# Patient Record
Sex: FEMALE | Race: WHITE | NOT HISPANIC OR LATINO | Employment: OTHER | URBAN - METROPOLITAN AREA
[De-identification: names, ages, dates, MRNs, and addresses within clinical notes are randomized per-mention and may not be internally consistent; named-entity substitution may affect disease eponyms.]

---

## 2017-04-01 ENCOUNTER — HOSPITAL ENCOUNTER (OUTPATIENT)
Dept: LAB | Facility: MEDICAL CENTER | Age: 72
End: 2017-04-01
Attending: FAMILY MEDICINE
Payer: MEDICARE

## 2017-04-01 LAB
ALBUMIN SERPL BCP-MCNC: 4.7 G/DL (ref 3.2–4.9)
ALBUMIN/GLOB SERPL: 1.5 G/DL
ALP SERPL-CCNC: 68 U/L (ref 30–99)
ALT SERPL-CCNC: 16 U/L (ref 2–50)
ANION GAP SERPL CALC-SCNC: 6 MMOL/L (ref 0–11.9)
APPEARANCE UR: ABNORMAL
AST SERPL-CCNC: 21 U/L (ref 12–45)
BACTERIA #/AREA URNS HPF: ABNORMAL /HPF
BASOPHILS # BLD AUTO: 0.04 K/UL (ref 0–0.12)
BASOPHILS NFR BLD AUTO: 0.7 % (ref 0–1.8)
BILIRUB SERPL-MCNC: 0.7 MG/DL (ref 0.1–1.5)
BILIRUB UR QL STRIP.AUTO: NEGATIVE
BUN SERPL-MCNC: 25 MG/DL (ref 8–22)
CALCIUM SERPL-MCNC: 9.8 MG/DL (ref 8.5–10.5)
CHLORIDE SERPL-SCNC: 102 MMOL/L (ref 96–112)
CHOLEST SERPL-MCNC: 203 MG/DL (ref 100–199)
CO2 SERPL-SCNC: 30 MMOL/L (ref 20–33)
COLOR UR AUTO: YELLOW
CREAT SERPL-MCNC: 0.96 MG/DL (ref 0.5–1.4)
EOSINOPHIL # BLD: 0.08 K/UL (ref 0–0.51)
EOSINOPHIL NFR BLD AUTO: 1.4 % (ref 0–6.9)
EPITHELIAL CELLS 1715: ABNORMAL /HPF
ERYTHROCYTE [DISTWIDTH] IN BLOOD BY AUTOMATED COUNT: 42.6 FL (ref 35.9–50)
GLOBULIN SER CALC-MCNC: 3.2 G/DL (ref 1.9–3.5)
GLUCOSE SERPL-MCNC: 92 MG/DL (ref 65–99)
GLUCOSE UR STRIP.AUTO-MCNC: NEGATIVE MG/DL
HCT VFR BLD AUTO: 47.2 % (ref 37–47)
HDLC SERPL-MCNC: 52 MG/DL
HGB BLD-MCNC: 15.3 G/DL (ref 12–16)
IMM GRANULOCYTES # BLD AUTO: 0.01 K/UL (ref 0–0.11)
IMM GRANULOCYTES NFR BLD AUTO: 0.2 % (ref 0–0.9)
KETONES UR STRIP.AUTO-MCNC: NEGATIVE MG/DL
LDLC SERPL CALC-MCNC: 127 MG/DL
LEUKOCYTE ESTERASE UR QL STRIP.AUTO: ABNORMAL
LYMPHOCYTES # BLD: 2.49 K/UL (ref 1–4.8)
LYMPHOCYTES NFR BLD AUTO: 43.9 % (ref 22–41)
MCH RBC QN AUTO: 29.3 PG (ref 27–33)
MCHC RBC AUTO-ENTMCNC: 32.4 G/DL (ref 33.6–35)
MCV RBC AUTO: 90.4 FL (ref 81.4–97.8)
MICRO URNS: ABNORMAL
MONOCYTES # BLD: 0.48 K/UL (ref 0–0.85)
MONOCYTES NFR BLD AUTO: 8.5 % (ref 0–13.4)
MUCOUS THREADS URNS QL MICRO: ABNORMAL /HPF
NEUTROPHILS # BLD: 2.57 K/UL (ref 2–7.15)
NEUTROPHILS NFR BLD AUTO: 45.3 % (ref 44–72)
NITRITE UR QL STRIP.AUTO: NEGATIVE
NRBC # BLD AUTO: 0 K/UL
NRBC BLD-RTO: 0 /100 WBC
PH UR: 5.5 [PH]
PLATELET # BLD AUTO: 159 K/UL (ref 164–446)
PMV BLD AUTO: 12.6 FL (ref 9–12.9)
POTASSIUM SERPL-SCNC: 4 MMOL/L (ref 3.6–5.5)
PROT SERPL-MCNC: 7.9 G/DL (ref 6–8.2)
PROT UR QL STRIP: NEGATIVE MG/DL
RBC # BLD AUTO: 5.22 M/UL (ref 4.2–5.4)
RBC #/AREA URNS HPF: ABNORMAL /HPF
RBC UR QL AUTO: NEGATIVE
SODIUM SERPL-SCNC: 138 MMOL/L (ref 135–145)
SP GR UR STRIP.AUTO: 1.02
TRANS CELLS URNS QL MICRO: ABNORMAL /HPF
TRIGL SERPL-MCNC: 118 MG/DL (ref 0–149)
TSH SERPL DL<=0.005 MIU/L-ACNC: 1.41 UIU/ML (ref 0.3–3.7)
WBC # BLD AUTO: 5.7 K/UL (ref 4.8–10.8)
WBC #/AREA URNS HPF: ABNORMAL /HPF

## 2017-04-01 PROCEDURE — 81001 URINALYSIS AUTO W/SCOPE: CPT

## 2017-04-01 PROCEDURE — 84443 ASSAY THYROID STIM HORMONE: CPT

## 2017-04-01 PROCEDURE — 80053 COMPREHEN METABOLIC PANEL: CPT

## 2017-04-01 PROCEDURE — 80061 LIPID PANEL: CPT

## 2017-04-01 PROCEDURE — 36415 COLL VENOUS BLD VENIPUNCTURE: CPT

## 2017-04-01 PROCEDURE — 85025 COMPLETE CBC W/AUTO DIFF WBC: CPT

## 2017-07-16 ENCOUNTER — APPOINTMENT (OUTPATIENT)
Dept: RADIOLOGY | Facility: MEDICAL CENTER | Age: 72
End: 2017-07-16
Attending: EMERGENCY MEDICINE
Payer: MEDICARE

## 2017-07-16 ENCOUNTER — HOSPITAL ENCOUNTER (EMERGENCY)
Facility: MEDICAL CENTER | Age: 72
End: 2017-07-16
Attending: EMERGENCY MEDICINE
Payer: MEDICARE

## 2017-07-16 VITALS
WEIGHT: 102 LBS | HEART RATE: 83 BPM | RESPIRATION RATE: 18 BRPM | DIASTOLIC BLOOD PRESSURE: 64 MMHG | HEIGHT: 62 IN | TEMPERATURE: 99.6 F | SYSTOLIC BLOOD PRESSURE: 152 MMHG | OXYGEN SATURATION: 96 % | BODY MASS INDEX: 18.77 KG/M2

## 2017-07-16 DIAGNOSIS — R07.9 CHEST PAIN, UNSPECIFIED TYPE: ICD-10-CM

## 2017-07-16 DIAGNOSIS — J18.9 PNEUMONIA OF RIGHT MIDDLE LOBE DUE TO INFECTIOUS ORGANISM: ICD-10-CM

## 2017-07-16 LAB
ALBUMIN SERPL BCP-MCNC: 3.9 G/DL (ref 3.2–4.9)
ALBUMIN/GLOB SERPL: 1.1 G/DL
ALP SERPL-CCNC: 69 U/L (ref 30–99)
ALT SERPL-CCNC: 16 U/L (ref 2–50)
ANION GAP SERPL CALC-SCNC: 9 MMOL/L (ref 0–11.9)
APPEARANCE UR: CLEAR
APTT PPP: 24.9 SEC (ref 24.7–36)
AST SERPL-CCNC: 19 U/L (ref 12–45)
BACTERIA #/AREA URNS HPF: ABNORMAL /HPF
BASOPHILS # BLD AUTO: 0.1 % (ref 0–1.8)
BASOPHILS # BLD: 0.01 K/UL (ref 0–0.12)
BILIRUB SERPL-MCNC: 0.7 MG/DL (ref 0.1–1.5)
BILIRUB UR QL STRIP.AUTO: NEGATIVE
BNP SERPL-MCNC: 28 PG/ML (ref 0–100)
BUN SERPL-MCNC: 13 MG/DL (ref 8–22)
CALCIUM SERPL-MCNC: 9.2 MG/DL (ref 8.4–10.2)
CHLORIDE SERPL-SCNC: 102 MMOL/L (ref 96–112)
CO2 SERPL-SCNC: 27 MMOL/L (ref 20–33)
COLOR UR: YELLOW
CREAT SERPL-MCNC: 0.74 MG/DL (ref 0.5–1.4)
EKG IMPRESSION: NORMAL
EOSINOPHIL # BLD AUTO: 0.02 K/UL (ref 0–0.51)
EOSINOPHIL NFR BLD: 0.2 % (ref 0–6.9)
EPI CELLS #/AREA URNS HPF: ABNORMAL /HPF
ERYTHROCYTE [DISTWIDTH] IN BLOOD BY AUTOMATED COUNT: 41.7 FL (ref 35.9–50)
GFR SERPL CREATININE-BSD FRML MDRD: >60 ML/MIN/1.73 M 2
GLOBULIN SER CALC-MCNC: 3.5 G/DL (ref 1.9–3.5)
GLUCOSE SERPL-MCNC: 107 MG/DL (ref 65–99)
GLUCOSE UR STRIP.AUTO-MCNC: NEGATIVE MG/DL
HCT VFR BLD AUTO: 41.1 % (ref 37–47)
HGB BLD-MCNC: 14 G/DL (ref 12–16)
IMM GRANULOCYTES # BLD AUTO: 0.02 K/UL (ref 0–0.11)
IMM GRANULOCYTES NFR BLD AUTO: 0.2 % (ref 0–0.9)
INR PPP: 0.98 (ref 0.87–1.13)
KETONES UR STRIP.AUTO-MCNC: NEGATIVE MG/DL
LACTATE BLD-SCNC: 1.37 MMOL/L (ref 0.5–2)
LEUKOCYTE ESTERASE UR QL STRIP.AUTO: NEGATIVE
LIPASE SERPL-CCNC: 19 U/L (ref 7–58)
LYMPHOCYTES # BLD AUTO: 1.21 K/UL (ref 1–4.8)
LYMPHOCYTES NFR BLD: 10.8 % (ref 22–41)
MCH RBC QN AUTO: 30.2 PG (ref 27–33)
MCHC RBC AUTO-ENTMCNC: 34.1 G/DL (ref 33.6–35)
MCV RBC AUTO: 88.8 FL (ref 81.4–97.8)
MICRO URNS: ABNORMAL
MONOCYTES # BLD AUTO: 0.82 K/UL (ref 0–0.85)
MONOCYTES NFR BLD AUTO: 7.3 % (ref 0–13.4)
NEUTROPHILS # BLD AUTO: 9.13 K/UL (ref 2–7.15)
NEUTROPHILS NFR BLD: 81.4 % (ref 44–72)
NITRITE UR QL STRIP.AUTO: NEGATIVE
NRBC # BLD AUTO: 0 K/UL
NRBC BLD AUTO-RTO: 0 /100 WBC
PH UR STRIP.AUTO: 7 [PH]
PLATELET # BLD AUTO: 178 K/UL (ref 164–446)
PMV BLD AUTO: 11.4 FL (ref 9–12.9)
POTASSIUM SERPL-SCNC: 4 MMOL/L (ref 3.6–5.5)
PROT SERPL-MCNC: 7.4 G/DL (ref 6–8.2)
PROT UR QL STRIP: NEGATIVE MG/DL
PROTHROMBIN TIME: 12.8 SEC (ref 12–14.6)
RBC # BLD AUTO: 4.63 M/UL (ref 4.2–5.4)
RBC # URNS HPF: ABNORMAL /HPF
RBC UR QL AUTO: ABNORMAL
SODIUM SERPL-SCNC: 138 MMOL/L (ref 135–145)
SP GR UR STRIP.AUTO: <=1.005
SPECIMEN DRAWN FROM PATIENT: NORMAL
T4 FREE SERPL-MCNC: 1.07 NG/DL (ref 0.53–1.43)
TROPONIN I SERPL-MCNC: <0.05 NG/ML
TSH SERPL DL<=0.005 MIU/L-ACNC: 1.27 UIU/ML (ref 0.3–3.7)
WBC # BLD AUTO: 11.2 K/UL (ref 4.8–10.8)
WBC #/AREA URNS HPF: ABNORMAL /HPF

## 2017-07-16 PROCEDURE — 96365 THER/PROPH/DIAG IV INF INIT: CPT

## 2017-07-16 PROCEDURE — 81001 URINALYSIS AUTO W/SCOPE: CPT

## 2017-07-16 PROCEDURE — 99284 EMERGENCY DEPT VISIT MOD MDM: CPT

## 2017-07-16 PROCEDURE — 93005 ELECTROCARDIOGRAM TRACING: CPT | Performed by: EMERGENCY MEDICINE

## 2017-07-16 PROCEDURE — 85610 PROTHROMBIN TIME: CPT

## 2017-07-16 PROCEDURE — 80053 COMPREHEN METABOLIC PANEL: CPT

## 2017-07-16 PROCEDURE — 700111 HCHG RX REV CODE 636 W/ 250 OVERRIDE (IP): Performed by: EMERGENCY MEDICINE

## 2017-07-16 PROCEDURE — 84443 ASSAY THYROID STIM HORMONE: CPT

## 2017-07-16 PROCEDURE — 83880 ASSAY OF NATRIURETIC PEPTIDE: CPT

## 2017-07-16 PROCEDURE — 85730 THROMBOPLASTIN TIME PARTIAL: CPT

## 2017-07-16 PROCEDURE — 84484 ASSAY OF TROPONIN QUANT: CPT

## 2017-07-16 PROCEDURE — 71010 DX-CHEST-PORTABLE (1 VIEW): CPT

## 2017-07-16 PROCEDURE — 85025 COMPLETE CBC W/AUTO DIFF WBC: CPT

## 2017-07-16 PROCEDURE — 700117 HCHG RX CONTRAST REV CODE 255: Performed by: EMERGENCY MEDICINE

## 2017-07-16 PROCEDURE — 74175 CTA ABDOMEN W/CONTRAST: CPT

## 2017-07-16 PROCEDURE — 93005 ELECTROCARDIOGRAM TRACING: CPT

## 2017-07-16 PROCEDURE — 700105 HCHG RX REV CODE 258: Performed by: EMERGENCY MEDICINE

## 2017-07-16 PROCEDURE — 36415 COLL VENOUS BLD VENIPUNCTURE: CPT

## 2017-07-16 PROCEDURE — 83605 ASSAY OF LACTIC ACID: CPT

## 2017-07-16 PROCEDURE — 83690 ASSAY OF LIPASE: CPT

## 2017-07-16 PROCEDURE — 96367 TX/PROPH/DG ADDL SEQ IV INF: CPT

## 2017-07-16 PROCEDURE — 84439 ASSAY OF FREE THYROXINE: CPT

## 2017-07-16 RX ORDER — SODIUM CHLORIDE 9 MG/ML
INJECTION, SOLUTION INTRAVENOUS CONTINUOUS
Status: DISCONTINUED | OUTPATIENT
Start: 2017-07-16 | End: 2017-07-17 | Stop reason: HOSPADM

## 2017-07-16 RX ORDER — AMPICILLIN AND SULBACTAM 2; 1 G/1; G/1
3 INJECTION, POWDER, FOR SOLUTION INTRAMUSCULAR; INTRAVENOUS ONCE
Status: COMPLETED | OUTPATIENT
Start: 2017-07-16 | End: 2017-07-16

## 2017-07-16 RX ORDER — AZITHROMYCIN 250 MG/1
TABLET, FILM COATED ORAL
Qty: 6 TAB | Refills: 0 | Status: SHIPPED | OUTPATIENT
Start: 2017-07-16 | End: 2017-11-07

## 2017-07-16 RX ADMIN — AMPICILLIN SODIUM AND SULBACTAM SODIUM 3 G: 2; 1 INJECTION, POWDER, FOR SOLUTION INTRAMUSCULAR; INTRAVENOUS at 20:07

## 2017-07-16 RX ADMIN — AZITHROMYCIN MONOHYDRATE 500 MG: 500 INJECTION, POWDER, LYOPHILIZED, FOR SOLUTION INTRAVENOUS at 20:50

## 2017-07-16 RX ADMIN — IOHEXOL 100 ML: 350 INJECTION, SOLUTION INTRAVENOUS at 18:52

## 2017-07-16 RX ADMIN — SODIUM CHLORIDE: 9 INJECTION, SOLUTION INTRAVENOUS at 17:57

## 2017-07-16 ASSESSMENT — PAIN SCALES - GENERAL: PAINLEVEL_OUTOF10: 0

## 2017-07-16 NOTE — ED AVS SNAPSHOT
Home Care Instructions                                                                                                                Kimmy Diaz   MRN: [id number]    Department:  [facility], Emergency Dept   Date of Visit:  7/16/2017            [facility], Emergency Dept    [address]    [place] [address]    Phone:  [phone]      You were seen by     Guy G Gansert, M.D.      Your Diagnosis Was     Pneumonia of right middle lobe due to infectious organism     J18.1       These are the medications you received during your hospitalization from 07/16/2017 1713 to 07/16/2017 2145     Date/Time Order Dose Route Action    07/16/2017 1757 NS infusion   Intravenous New Bag    07/16/2017 1852 iohexol (OMNIPAQUE) 350 mg/mL 100 mL Intravenous Given    07/16/2017 2007 ampicillin/sulbactam (UNASYN) injection 3 g 3 g Intravenous Given    07/16/2017 2050 azithromycin (ZITHROMAX) 500 mg in D5W 250 mL IVPB 500 mg Intravenous New Bag      Follow-up Information     1. Follow up with Grant Albert M.D..    Specialty:  Internal Medicine    Contact information    [address] [facility] [address]  [place] [address]  [phone]        Medication Information     Review all of your home medications and newly ordered medications with your primary doctor and/or pharmacist as soon as possible. Follow medication instructions as directed by your doctor and/or pharmacist.     Please keep your complete medication list with you and share with your physician. Update the information when medications are discontinued, doses are changed, or new medications (including over-the-counter products) are added; and carry medication information at all times in the event of emergency situations.               Medication List      START taking these medications        Instructions    Morning Afternoon Evening Bedtime    azithromycin 250 MG Tabs   Commonly known as:  ZITHROMAX        Take two tabs  by mouth on day one, then one tab by mouth daily on days 2-5.                          ASK your doctor about these medications        Instructions    Morning Afternoon Evening Bedtime    CALCIUM 500 + D PO        Take  by mouth every day.                        FLONASE NA        Spray  in nose every day.                        MIRALAX PO        Take  by mouth as needed.                        ONE-A-DAY WOMENS FORMULA PO        Take  by mouth every day.                        oxycodone-acetaminophen 5-325 MG Tabs   Commonly known as:  PERCOCET        Take 1-2 Tabs by mouth every four hours as needed for Mild Pain.   Dose:  1-2 Tab                        vitamin D 1000 UNIT Tabs   Commonly known as:  cholecalciferol        Take 1,000 Units by mouth every day.   Dose:  1000 Units                             Where to Get Your Medications      You can get these medications from any pharmacy     Bring a paper prescription for each of these medications    - azithromycin 250 MG Tabs            Procedures and tests performed during your visit     Procedure/Test Number of Times Performed    APTT 1    Btype Natriuretic Peptide 1    CBC w/ Differential 1    CONSENT FOR CONTRAST INJECTION 1    CT-CTA COMPLETE THORACOABDOMINAL AORTA 1    Cardiac Monitoring 1    Complete Metabolic Panel (CMP) 1    DX-CHEST-PORTABLE (1 VIEW) 1    EKG (NOW) 2    ESTIMATED GFR 1    Free Thyroxine (add to TSH for patients with existing thyroid dysfunction) 1    IV Saline Lock 1    LACTIC ACID 1    Lipase 1    NURSING COMMUNICATION 1    Obtain Old EKG 1    PT/INR 1    Pulse Ox 1    Repeat Vitals Signs 1    TROPONIN MANUAL (ARBILL) 1    TSH (for screening thyroid dysfunction) 1    URINALYSIS 1    URINE MICROSCOPIC (W/UA) 1        Discharge Instructions         Chest Pain, Nonspecific  It is often hard to give a specific diagnosis for the cause of chest pain. There is always a chance that your pain could be related to something serious, like a heart  attack or a blood clot in the lungs. You need to follow up with your caregiver for further evaluation. More lab tests or other studies such as X-rays, electrocardiography, stress testing, or cardiac imaging may be needed to find the cause of your pain.  Most of the time, nonspecific chest pain improves within 2 to 3 days with rest and mild pain medicine. For the next few days, avoid physical exertion or activities that bring on pain. Do not smoke. Avoid drinking alcohol. Call your caregiver for routine follow-up as advised.   SEEK IMMEDIATE MEDICAL CARE IF:  · You develop increased chest pain or pain that radiates to the arm, neck, jaw, back, or abdomen.   · You develop shortness of breath, increased coughing, or you start coughing up blood.   · You have severe back or abdominal pain, nausea, or vomiting.   · You develop severe weakness, fainting, fever, or chills.   Document Released: 12/18/2006 Document Revised: 03/11/2013 Document Reviewed: 06/06/2008  [employer]® Patient Information ©2013 [employer].Pneumonia, Adult  Pneumonia is an infection of the lungs.   CAUSES  Pneumonia may be caused by bacteria or a virus. Usually, the infection is caused by breathing in droplets from an infected person's cough or sneeze.   SYMPTOMS   Symptoms of pneumonia include:  · Cough.  · Fever.  · Chest pain.  · Rapid breathing.  · Shortness of breath.  · Shaking chills.  · Mucus production.  DIAGNOSIS   If you have the common symptoms of pneumonia, often your health care provider will confirm the diagnosis with a chest X-ray. The X-ray will show an abnormality in the lung if you have pneumonia. Other tests may be done on your blood, urine, or mucus (sputum) to find the specific cause of your pneumonia. A blood gas test or pulse oximetry test may be needed to check how well your lungs are working.  TREATMENT   Your treatment will depend on whether your pneumonia is caused by bacteria or a virus.   · Bacterial pneumonia is treated  with antibiotic medicine.  · Pneumonia that is caused by the influenza virus may be treated with an antiviral medicine.  · Pneumonia that is caused by a virus other than influenza will not respond to antibiotic medicine. This type of pneumonia will have to run its course.   HOME CARE INSTRUCTIONS   · Cough suppressants may be used if you are losing too much rest from coughing at night. However, you should try to avoid taking cough suppresants. This is because coughing helps to remove mucus from your lungs.  · Sleep in a semi-upright position at night. Try sleeping in a reclining chair, or place a few pillows under your head.  · Try using a cold steam vaporizer or humidifier in your home or bedroom. This may help loosen your mucus.  · If you were prescribed an antibiotic medicine, finish all of it even if you start to feel better.  · If you were prescribed an expectorant, take it as directed by your health care provider. This medicine loosens the mucus so you can cough it up.  · Take medicines only as directed by your health care provider.  · Do not smoke. If you are a smoker and continue to smoke, your cough may last several weeks after your pneumonia has cleared.  · Get rest when you feel tired, or as needed.  PREVENTION  A pneumococcal shot (vaccine) is available to prevent a common bacterial cause of pneumonia. This is usually suggested for:  · People over 65 years old.  · People on chemotherapy.  · People with chronic lung problems, such as bronchitis or emphysema.  · People with immune system problems.  If you are over 65 years old or have a high risk condition, you may receive the pneumococcal vaccine if you have not received it before. In some countries, a routine influenza vaccine is also recommended. This vaccine can help prevent some cases of pneumonia. You may be offered the influenza vaccine as part of your care.  If you are a smoker, it is time to quit in order to prevent pneumonia in the future. You  may receive instructions on how to stop smoking. Your health care provider can provide medicines and counseling to help you quit.  SEEK MEDICAL CARE IF:  · You have a fever.  · You cannot control your cough with suppressants at night, and you keep losing sleep.  SEEK IMMEDIATE MEDICAL CARE IF:   · You have worsening shortness of breath.  · You have increased chest pain.  · Your sickness becomes worse, especially if you are an older adult or have a weakened immune system.  · You cough up blood.  · You have pain that is getting worse or is not controlled with medicines.  · Your symptoms are getting worse rather than better.     This information is not intended to replace advice given to you by your health care provider. Make sure you discuss any questions you have with your health care provider.     Document Released: 12/18/2006 Document Revised: 01/08/2016 Document Reviewed: 04/13/2016  [employer] Patient Education ©2016 [employer]            Patient Information     Patient Information    Following emergency treatment: all patient requiring follow-up care must return either to a private physician or a clinic if your condition worsens before you are able to obtain further medical attention, please return to the emergency room.     Billing Information    At [facility], we work to make the billing process streamlined for our patients.  Our Representatives are here to answer any questions you may have regarding your hospital bill.  If you have insurance coverage and have supplied your insurance information to us, we will submit a claim to your insurer on your behalf.  Should you have any questions regarding your bill, we can be reached online or by phone as follows:  Online: You are able pay your bills online or live chat with our representatives about any billing questions you may have. We are here to help Monday - Friday from 8:00am to 7:30pm and 9:00am - 12:00pm on Saturdays.  Please visit  [email or web address]  for more information.   Phone:  [phone] or [phone]    Please note that your emergency physician, surgeon, pathologist, radiologist, anesthesiologist, and other specialists are not employed by [facility] and will therefore bill separately for their services.  Please contact them directly for any questions concerning their bills at the numbers below:     Emergency Physician Services:  [phone]  [facility]:  [phone]  [facility]:  [phone]  [facility]:  [phone]    1. Your final bill may vary from the amount quoted upon discharge if all procedures are not complete at that time, or if your doctor has additional procedures of which we are not aware. You will receive an additional bill if you return to the Emergency Department at [facility] for suture removal regardless of the facility of which the sutures were placed.     2. Please arrange for settlement of this account at the emergency registration.    3. All self-pay accounts are due in full at the time of treatment.  If you are unable to meet this obligation then payment is expected within 4-5 days.     4. If you have had radiology studies (CT, X-ray, Ultrasound, MRI), you have received a preliminary result during your emergency department visit. Please contact the radiology department [phone] to receive a copy of your final result. Please discuss the Final result with your primary physician or with the follow up physician provided.     Crisis Hotline:  [place] Crisis Hotline:  5-491-NGDHTBO or 1-829.845.3185  [place] Crisis Hotline:    1-771.945.7181 or 670-970-3465         ED Discharge Follow Up Questions    1. In order to provide you with very good care, we would like to follow up with a phone call in the next few days.  May we have your permission to contact you?     YES /  NO    2. What is the best phone number to call  you? (       )_____-__________    3. What is the best time to call you?      Morning  /  Afternoon  /  Evening                   Patient Signature:  ____________________________________________________________    Date:  ____________________________________________________________      Your appointments     Sep 15, 2017  9:30 AM   NEW PATIENT with Jaiden Baldwin M.D.   [facility] (--)    [address] [facility], [address] [address]   [phone]

## 2017-07-16 NOTE — ED AVS SNAPSHOT
[employer] Access Code: [email or web address]  Expires: 8/15/2017  9:45 PM    Your email address is not on file at [facility].  Email Addresses are required for you to sign up for [employer], please contact [phone] to verify your personal information and to provide your email address prior to attempting to register for [employer].    Kimmy Diaz  [address] [place] [address]    [employer]  A secure, online tool to manage your health information     [employer]’s [employer]® is a secure, online tool that connects you to your personalized health information from the privacy of your home -- day or night - making it very easy for you to manage your healthcare. Once the activation process is completed, you can even access your medical information using the [employer] kenneth, which is available for free in the Apple Kenneth store or Google Play store.     To learn more about [employer], visit [email or web address]    There are two levels of access available (as shown below):   My Chart Features  [facility] Primary Care Doctor [facility]  Specialists [facility]  Urgent  Care Non-[facility] Primary Care Doctor   Email your healthcare team securely and privately 24/7 X X X    Manage appointments: schedule your next appointment; view details of past/upcoming appointments X      Request prescription refills. X      View recent personal medical records, including lab and immunizations X X X X   View health record, including health history, allergies, medications X X X X   Read reports about your outpatient visits, procedures, consult and ER notes X X X X   See your discharge summary, which is a recap of your hospital and/or ER visit that includes your diagnosis, lab results, and care plan X X  X     How to register for [employer]:  Once your e-mail address has been verified, follow the following steps to sign up for [employer].     1. Go to  [email or web address]  2. Click on the Sign Up Now box, which takes you to the New Member Sign Up page. You will  need to provide the following information:  a. Enter your [employer] Access Code exactly as it appears at the top of this page. (You will not need to use this code after you’ve completed the sign-up process. If you do not sign up before the expiration date, you must request a new code.)   b. Enter your date of birth.   c. Enter your home email address.   d. Click Submit, and follow the next screen’s instructions.  3. Create a Exclusive [employer] ID. This will be your [employer] login ID and cannot be changed, so think of one that is secure and easy to remember.  4. Create a [employer] password. You can change your password at any time.  5. Enter your Password Reset Question and Answer. This can be used at a later time if you forget your password.   6. Enter your e-mail address. This allows you to receive e-mail notifications when new information is available in [employer].  7. Click Sign Up. You can now view your health information.    For assistance activating your [employer] account, call [phone]

## 2017-07-16 NOTE — ED AVS SNAPSHOT
7/16/2017    Kimmy Diaz  [address]  [place] [address]    Dear Kimmy:    [facility] wants to ensure your discharge home is safe and you or your loved ones have had all of your questions answered regarding your care after you leave the hospital.    Below is a list of resources and contact information should you have any questions regarding your hospital stay, follow-up instructions, or active medical symptoms.    Questions or Concerns Regarding… Contact   Medical Questions Related to Your Discharge  (7 days a week, 8am-5pm) Contact a Nurse Care Coordinator   [phone]   Medical Questions Not Related to Your Discharge  (24 hours a day / 7 days a week)  Contact the Nurse Health Line   [phone]    Medications or Discharge Instructions Refer to your discharge packet   or contact your [facility] Primary Care Provider   [phone]   Follow-up Appointment(s) Schedule your appointment via [employer]   or contact Scheduling [phone]   Billing Review your statement via [employer]  or contact Billing [phone]   Medical Records Review your records via [employer]   or contact Medical Records [phone]     You may receive a telephone call within two days of discharge. This call is to make certain you understand your discharge instructions and have the opportunity to have any questions answered. You can also easily access your medical information, test results and upcoming appointments via the [employer] free online health management tool. You can learn more and sign up at [email or web address]. For assistance setting up your [employer] account, please call [phone].    Once again, we want to ensure your discharge home is safe and that you have a clear understanding of any next steps in your care. If you have any questions or concerns, please do not hesitate to contact us, we are here for you. Thank you for choosing [facility] for your healthcare needs.    Sincerely,    Your [facility] Healthcare Team

## 2017-07-17 NOTE — ED PROVIDER NOTES
ED Provider Note    CHIEF COMPLAINT  Chief Complaint   Patient presents with   • Epigastric Pain     heart burn, rash , sore throat       HPI  Kimmy Diaz is a 72 y.o. female who presents for evaluation of chest pain.  The patient states she's been having problems with chest pain and tightness in chest for several years.  She has attributed to tightness in her bra off and she would normally take it off and relieve the symptoms.  However, lately she is having the symptoms despite eliminating or not wearing a bra.  She was seen by her primary care physician recently and an EKG was done which apparently was normal.  The patient had increased symptoms today and was sent in by her primary care because of the concern for possible aortic aneurysm.  The patient has some mild shortness of breath associated with it.  She denies associated exertional chest pain.  She has some slight nausea but denies any vomiting or specific food intolerance.  Currently, she denies: Fever, chills, cough, sputum, vomiting, hematemesis, melena hematochezia, hematuria, dysuria, palpitations, syncope, pain or swelling lower extremities, neurological symptoms.  The patient did develop a rash on her back while taking a shower today.  She states she took some over-the-counter antihistamine as well as Gaviscon today.  No other acute symptomatology or complaints.    REVIEW OF SYSTEMS  See HPI for further details.  No history of: Hypertension, diabetes, seizures, heart disease, gastrointestinal disorders, cancer, stroke.  She does take Synthroid.  She's had surgery for a cystocele and a rectocele.  All other systems negative.    PAST MEDICAL HISTORY  Past Medical History   Diagnosis Date   • Osteoporosis    • Vitamin D deficiency    • Allergic rhinitis    • Hemochromatosis      carrier   • Other specified symptom associated with female genital organs      prolapse   • Urinary bladder disorder      prolapse   • Cataract        FAMILY  "HISTORY  Family History   Problem Relation Age of Onset   • Diabetes         SOCIAL HISTORY  Nonsmoker; positive alcohol use;    SURGICAL HISTORY  Past Surgical History   Procedure Laterality Date   • Tubal ligation  1980   • Other       wisdom  teeth   • Vaginal hysterectomy scope total  8/24/2011     Performed by QAMAR COPELAND at SURGERY SAME DAY [facility]S   • Anterior and posterior repair  8/24/2011     Performed by QAMAR COPELAND at SURGERY SAME DAY [facility]S   • Shoulder arthroscopy w/ rotator cuff repair  7/11/2012     Performed by SVETLANA MCLEOD at SURGERY [facility] ORS   • Biceps tendon repair  7/11/2012     Performed by SVETLANA MCLEOD at SURGERY [facility] ORS   • Shoulder decompression  7/11/2012     Performed by SVETLANA MCLEOD at SURGERY [facility] ORS       CURRENT MEDICATIONS  See nurses notes    ALLERGIES  Allergies   Allergen Reactions   • Iodine Rash     rash   • Mercury Rash   • Other Environmental      Grasses, trees, animal dander       PHYSICAL EXAM  VITAL SIGNS: /73 mmHg  Pulse 92  Temp(Src) 37.8 °C (100.1 °F)  Resp 16  Ht 1.575 m (5' 2\")  Wt 46.267 kg (102 lb)  BMI 18.65 kg/m2  SpO2 95%   Constitutional: A 72-year-old female, appears mildly anxious, awake, oriented ×3   HENT: ,Atraumatic, Bilateral external ears normal, tympanic membranes clear, Oropharynx moist, No oral exudates, Nose normal.   Eyes: PERRL, EOMI, Conjunctiva normal, No discharge.   Neck: Normal range of motion, No tenderness, Supple, No stridor.   Lymphatic: No lymphadenopathy noted.   Cardiovascular: Normal heart rate, Normal rhythm, No murmurs, No rubs, No gallops.   Thorax & Lungs: Normal Equal breath sounds, No respiratory distress, No wheezing, no stridor, no rales. No chest tenderness.   Abdomen: Soft, nontender, nondistended, no organomegaly, positive bowel sounds normal in quality. No guarding or rebound.  Skin: Good skin turgor, pink, warm, dry. No petechiae, purpura.  Patient has mild " erythematous macular papular rash over the back area only but no associated petechiae or purpura or vesicles; Normal capillary refill.   Back: No tenderness, No CVA tenderness.   Extremities: Intact distal pulses, No edema, No tenderness, No cyanosis, No clubbing. Vascular: Pulses are 2+, symmetric in the upper and lower extremities.  Musculoskeletal: Mild diffuse arthritic changes. No tenderness to palpation or major deformities noted.   Neurologic: Alert & oriented x 3, Normal motor function, Normal sensory function, No gross focal deficits noted.   Psychiatric: Affect mildly anxious, Judgment normal, Mood normal.     EKG  I have interpreted: Rate 100, rhythm sinus tachycardia, left axis deviation, GA QRS QT interval is normal, no acute ischemic or injury changes, 12-lead EKG, the left axis deviations a change compared to a tracing of 2012;    RADIOLOGY/PROCEDURES  CT-CTA COMPLETE THORACOABDOMINAL AORTA   Final Result      1.  No thoracic or abdominal aortic aneurysm or dissection.   2.  Focal opacity in the medial RIGHT middle lobe, atelectasis versus pneumonia.   3.  Mild emphysema.   4.  Findings suggest rectosigmoid colitis.   5.  Diverticulitis is felt less likely.   6.  Minimal pelvic fluid present.   7.  Appendix is not visualized.         DX-CHEST-PORTABLE (1 VIEW)   Final Result      1.  No acute cardiopulmonary disease.   2.  No significant change from prior exam.            COURSE & MEDICAL DECISION MAKING  Pertinent Labs & Imaging studies reviewed. (See chart for details)  1.  Monitor  2.  IV normal saline  3.  Azithromycin 500 mg IV  4.  Unasyn 3 g IV    Laboratory studies: CBC shows white count of 11.2, 81% neutrophils, 10% lymphocytes, 7% monocytes, hemoglobin 14.0, hematocrit 41.1; coags within normal limits; lipase 19; CMP shows a random glucose of 109 otherwise within normal; troponin less than 0.05; lipase 19; lactic acid 1.37; free T4 1 0.07; urinalysis is negative; BNP 28; TSH normal at  1.27;    Discussion: At this time, the patient presents for evaluation of chest pain.  CT scanning of the chest showed no evidence of thoracic or abdominal aortic aneurysm.  She was noted to have a focal opacity in the right medial middle lobe which was atelectasis versus pneumonia.  She does have a low-grade fever has had a cough recently and an elevated white count.  Clinically, I would suspect that she could be developing a pneumonia.  However, she's been having chest pain much longer and could have other conditions.  She is scheduled to see cardiology.  CT scanning did not show any evidence of gallbladder disease and her LFTs and lipase are normal.  I did recommend admission for IV antibiotics and cardiac evaluation.  At this time, the patient and her  indicate that they would rather go home.  Based on her findings and presentation is not mandatory that she be admitted as long she gets close follow-up.  Therefore, I did speak with her primary care physician, Dr. Grant Albert and discussed the findings with him.  The patient was instructed to follow-up for reevaluation.  At any time should she feel like she is having: Significant fever, increasing chest pain, shortness of breath, or any other disconcerting symptoms to get reevaluated.  Of note the patient does also have a rash which is mostly some type of topical reaction as it appears to be localized to the back area.  Patient was discharged in stable condition.    FINAL IMPRESSION  1. Pneumonia of right middle lobe due to infectious organism    2. Chest pain, unspecified type        PLAN  1.  Appropriate discharge instructions given  2.  Azithromycin, Z-Mohan  3.  Follow-up with Dr. Grant Albert    Electronically signed by: Guy G Gansert, 7/16/2017 6:20 PM

## 2017-07-17 NOTE — ED NOTES
Saline lock was established and labs to the lab. Plan of care discussed, portable xray at the bedside. NS infusing and pt advised of NPO status,  at the bedside.

## 2017-07-17 NOTE — ED NOTES
Multiple issues, rash to her body after a shower today, sore throat, heart burn and some pain between her left breast bone. EKG done. Put on Prilosec after seeing her PMD last Thursday for heart burn.  stated that her PMD was concerned she may have an aneurysm.

## 2017-07-17 NOTE — DISCHARGE INSTRUCTIONS
Chest Pain, Nonspecific  It is often hard to give a specific diagnosis for the cause of chest pain. There is always a chance that your pain could be related to something serious, like a heart attack or a blood clot in the lungs. You need to follow up with your caregiver for further evaluation. More lab tests or other studies such as X-rays, electrocardiography, stress testing, or cardiac imaging may be needed to find the cause of your pain.  Most of the time, nonspecific chest pain improves within 2 to 3 days with rest and mild pain medicine. For the next few days, avoid physical exertion or activities that bring on pain. Do not smoke. Avoid drinking alcohol. Call your caregiver for routine follow-up as advised.   SEEK IMMEDIATE MEDICAL CARE IF:  · You develop increased chest pain or pain that radiates to the arm, neck, jaw, back, or abdomen.   · You develop shortness of breath, increased coughing, or you start coughing up blood.   · You have severe back or abdominal pain, nausea, or vomiting.   · You develop severe weakness, fainting, fever, or chills.   Document Released: 12/18/2006 Document Revised: 03/11/2013 Document Reviewed: 06/06/2008  [employer]® Patient Information ©2013 [employer].Pneumonia, Adult  Pneumonia is an infection of the lungs.   CAUSES  Pneumonia may be caused by bacteria or a virus. Usually, the infection is caused by breathing in droplets from an infected person's cough or sneeze.   SYMPTOMS   Symptoms of pneumonia include:  · Cough.  · Fever.  · Chest pain.  · Rapid breathing.  · Shortness of breath.  · Shaking chills.  · Mucus production.  DIAGNOSIS   If you have the common symptoms of pneumonia, often your health care provider will confirm the diagnosis with a chest X-ray. The X-ray will show an abnormality in the lung if you have pneumonia. Other tests may be done on your blood, urine, or mucus (sputum) to find the specific cause of your pneumonia. A blood gas test or pulse oximetry  test may be needed to check how well your lungs are working.  TREATMENT   Your treatment will depend on whether your pneumonia is caused by bacteria or a virus.   · Bacterial pneumonia is treated with antibiotic medicine.  · Pneumonia that is caused by the influenza virus may be treated with an antiviral medicine.  · Pneumonia that is caused by a virus other than influenza will not respond to antibiotic medicine. This type of pneumonia will have to run its course.   HOME CARE INSTRUCTIONS   · Cough suppressants may be used if you are losing too much rest from coughing at night. However, you should try to avoid taking cough suppresants. This is because coughing helps to remove mucus from your lungs.  · Sleep in a semi-upright position at night. Try sleeping in a reclining chair, or place a few pillows under your head.  · Try using a cold steam vaporizer or humidifier in your home or bedroom. This may help loosen your mucus.  · If you were prescribed an antibiotic medicine, finish all of it even if you start to feel better.  · If you were prescribed an expectorant, take it as directed by your health care provider. This medicine loosens the mucus so you can cough it up.  · Take medicines only as directed by your health care provider.  · Do not smoke. If you are a smoker and continue to smoke, your cough may last several weeks after your pneumonia has cleared.  · Get rest when you feel tired, or as needed.  PREVENTION  A pneumococcal shot (vaccine) is available to prevent a common bacterial cause of pneumonia. This is usually suggested for:  · People over 65 years old.  · People on chemotherapy.  · People with chronic lung problems, such as bronchitis or emphysema.  · People with immune system problems.  If you are over 65 years old or have a high risk condition, you may receive the pneumococcal vaccine if you have not received it before. In some countries, a routine influenza vaccine is also recommended. This vaccine  can help prevent some cases of pneumonia. You may be offered the influenza vaccine as part of your care.  If you are a smoker, it is time to quit in order to prevent pneumonia in the future. You may receive instructions on how to stop smoking. Your health care provider can provide medicines and counseling to help you quit.  SEEK MEDICAL CARE IF:  · You have a fever.  · You cannot control your cough with suppressants at night, and you keep losing sleep.  SEEK IMMEDIATE MEDICAL CARE IF:   · You have worsening shortness of breath.  · You have increased chest pain.  · Your sickness becomes worse, especially if you are an older adult or have a weakened immune system.  · You cough up blood.  · You have pain that is getting worse or is not controlled with medicines.  · Your symptoms are getting worse rather than better.     This information is not intended to replace advice given to you by your health care provider. Make sure you discuss any questions you have with your health care provider.     Document Released: 12/18/2006 Document Revised: 01/08/2016 Document Reviewed: 04/13/2016  [employer] Interactive Patient Education ©2016 [employer]

## 2017-07-17 NOTE — ED NOTES
Discharge information provided. Pt verbalized understanding of discharge instructions to follow up with PCP and to return to ER if condition worsens. Pt expressed the awareness of not driving or operating heavy machinery, has ride home with . Pt ambulated out of ER in a steady gait, no additional questions or concerns. Paper scripts given, educated on new medication.

## 2017-11-06 ENCOUNTER — TELEPHONE (OUTPATIENT)
Dept: CARDIOLOGY | Facility: MEDICAL CENTER | Age: 72
End: 2017-11-06

## 2017-11-06 NOTE — TELEPHONE ENCOUNTER
Spoke with patient, She has never seen a cardiologist before and has a referral in [facility] and will bring it tomorrow.

## 2017-11-07 ENCOUNTER — OFFICE VISIT (OUTPATIENT)
Dept: CARDIOLOGY | Facility: MEDICAL CENTER | Age: 72
End: 2017-11-07
Payer: MEDICARE

## 2017-11-07 VITALS
HEIGHT: 62 IN | SYSTOLIC BLOOD PRESSURE: 136 MMHG | DIASTOLIC BLOOD PRESSURE: 80 MMHG | BODY MASS INDEX: 22.08 KG/M2 | OXYGEN SATURATION: 95 % | WEIGHT: 120 LBS | HEART RATE: 72 BPM

## 2017-11-07 DIAGNOSIS — Z76.89 ESTABLISHING CARE WITH NEW DOCTOR, ENCOUNTER FOR: ICD-10-CM

## 2017-11-07 PROCEDURE — 99203 OFFICE O/P NEW LOW 30 MIN: CPT | Performed by: INTERNAL MEDICINE

## 2017-11-07 RX ORDER — LEVOTHYROXINE SODIUM 50 MCG
TABLET ORAL
COMMUNITY
Start: 2017-10-20 | End: 2018-12-13 | Stop reason: SDUPTHER

## 2017-11-07 ASSESSMENT — ENCOUNTER SYMPTOMS
SHORTNESS OF BREATH: 0
SORE THROAT: 0
FEVER: 0
BRUISES/BLEEDS EASILY: 0
FOCAL WEAKNESS: 0
CLAUDICATION: 0
HEARTBURN: 1
BLURRED VISION: 0
ABDOMINAL PAIN: 0
NAUSEA: 0
CHILLS: 0
DIZZINESS: 0
FALLS: 0
PND: 0
PALPITATIONS: 0
COUGH: 0
WEAKNESS: 0

## 2017-11-07 NOTE — LETTER
[facility]   [address] [place] [address]  M[place]OD[place] Hernandez [address]  Phone: [phone]  Fax: [phone]              Kimmy Diaz  1945    Encounter Date: 11/7/2017    Jaiden Baldwin M.D.          PROGRESS NOTE:  Subjective:   Kimmy Diaz is a 72 y.o. female who presents today For evaluation of her cardiovascular status    This year she presented with chest pains to the emergency room and tachycardia and was found to have a pneumonia which responded well to treatment she had a CTA in this setting given the tachycardia and normal chest x-ray which showed the pneumonia but also normal coronary arteries normal aorta and pulmonary arteries    In the past she was suggested she may have dyslipidemia on review she did have a low HDL of 38 test but otherwise has been in the 50s on recheck on a couple occasions.  Her LDLs in the 120s        Past Medical History:   Diagnosis Date   • Allergic rhinitis    • CATARACT    • Hemochromatosis     carrier   • Osteoporosis    • Other specified symptom associated with female genital organs     prolapse   • Urinary bladder disorder     prolapse   • Vitamin D deficiency      Past Surgical History:   Procedure Laterality Date   • SHOULDER ARTHROSCOPY W/ ROTATOR CUFF REPAIR  7/11/2012    Performed by SVETLANA MCLEOD at SURGERY [facility] ORS   • BICEPS TENDON REPAIR  7/11/2012    Performed by SVETLANA MCLEOD at SURGERY [facility] ORS   • SHOULDER DECOMPRESSION  7/11/2012    Performed by SVETLANA MCLEOD at SURGERY [facility] ORS   • VAGINAL HYSTERECTOMY SCOPE TOTAL  8/24/2011    Performed by QAMAR COPELAND at SURGERY SAME DAY [facility] ORS   • ANTERIOR AND POSTERIOR REPAIR  8/24/2011    Performed by QAMAR COPELAND at SURGERY SAME DAY [facility] ORS   • TUBAL LIGATION  1980   • OTHER      wisdom  teeth     Family History   Problem Relation Age of Onset   • Diabetes       History   Smoking Status   • Never Smoker    "  Smokeless Tobacco   • Never Used     Allergies   Allergen Reactions   • Iodine Rash     rash   • Mercury Rash   • Other Environmental      Grasses, trees, animal dander     Outpatient Encounter Prescriptions as of 11/7/2017   Medication Sig Dispense Refill   • SYNTHROID 50 MCG Tab      • RaNITidine HCl (ZANTAC PO) Take  by mouth.     • Fluticasone Propionate (FLONASE NA) Spray  in nose every day.     • [DISCONTINUED] azithromycin (ZITHROMAX) 250 MG Tab Take two tabs by mouth on day one, then one tab by mouth daily on days 2-5. 6 Tab 0   • oxycodone-acetaminophen (PERCOCET) 5-325 MG TABS Take 1-2 Tabs by mouth every four hours as needed for Mild Pain. 40 Each 0   • [DISCONTINUED] Polyethylene Glycol 3350 (MIRALAX PO) Take  by mouth as needed.     • [DISCONTINUED] Calcium Carbonate-Vitamin D (CALCIUM 500 + D PO) Take  by mouth every day.     • [DISCONTINUED] Multiple Vitamins-Calcium (ONE-A-DAY WOMENS FORMULA PO) Take  by mouth every day.     • [DISCONTINUED] vitamin D (CHOLECALCIFEROL) 1000 UNIT TABS Take 1,000 Units by mouth every day.       No facility-administered encounter medications on file as of 11/7/2017.      Review of Systems   Constitutional: Negative for chills and fever.   HENT: Negative for sore throat.    Eyes: Negative for blurred vision.   Respiratory: Negative for cough and shortness of breath.    Cardiovascular: Negative for chest pain, palpitations, claudication, leg swelling and PND.   Gastrointestinal: Positive for heartburn. Negative for abdominal pain and nausea.   Musculoskeletal: Negative for falls and joint pain.   Skin: Negative for rash.   Neurological: Negative for dizziness, focal weakness and weakness.   Endo/Heme/Allergies: Does not bruise/bleed easily.        Objective:   /80   Pulse 72   Ht 1.575 m (5' 2\")   Wt 54.4 kg (120 lb)   SpO2 95%   BMI 21.95 kg/m²      Physical Exam   Constitutional: No distress.   HENT:   Mouth/Throat: Oropharynx is clear and moist.   Eyes: No " scleral icterus.   Cardiovascular: Normal rate, regular rhythm and intact distal pulses.  Exam reveals no gallop and no friction rub.    No murmur heard.  Pulmonary/Chest: Effort normal and breath sounds normal. She has no rales.   Abdominal: Bowel sounds are normal. There is no tenderness.   Musculoskeletal: She exhibits no edema.   Neurological: She is alert.   Skin: No rash noted. She is not diaphoretic.   Psychiatric: She has a normal mood and affect.     Her EKG from primary care office shows sinus rhythm with normal axis and intervals this is similar to EKGs from 2011 and 12 while she was in the emergency room she did have sinus tachycardia    Reviewed her lab results from the emergency room which showed a white count and her lipid profiles have been normal    She also brought with her and EGD report  Assessment:     1. Establishing care with new doctor, encounter for         Medical Decision Making:  Today's Assessment / Status / Plan:     It was my pleasure to meet with Ms. Diaz.    She is accompanied by her     Fortunately she doesn't have any significant cardiac disease and is doing quite well appears her most important concern is osteopenia    Her discomfort could've been increased GERD related to the stress of pneumonia    Ms. Diaz does not require regular cardiology follow up, I have advised her to call our office or e-mail using my MyChart if needed.    It is my pleasure to participate in the care of Ms. Diaz.  Please do not hesitate to contact me with questions or concerns.    Jaiden Baldwin MD PhD [facility]  Cardiologist [facility]        Grant Albert M.D.  [address] [facility] [address]  [address] [address]  VIA Facsimile: [phone]     Chris Cole M.D.  [address]  [facility] [address]  VIA Facsimile: [phone]

## 2017-11-07 NOTE — PROGRESS NOTES
Subjective:   Kimmy Diaz is a 72 y.o. female who presents today For evaluation of her cardiovascular status    This year she presented with chest pains to the emergency room and tachycardia and was found to have a pneumonia which responded well to treatment she had a CTA in this setting given the tachycardia and normal chest x-ray which showed the pneumonia but also normal coronary arteries normal aorta and pulmonary arteries    In the past she was suggested she may have dyslipidemia on review she did have a low HDL of 38 test but otherwise has been in the 50s on recheck on a couple occasions.  Her LDLs in the 120s        Past Medical History:   Diagnosis Date   • Allergic rhinitis    • CATARACT    • Hemochromatosis     carrier   • Osteoporosis    • Other specified symptom associated with female genital organs     prolapse   • Urinary bladder disorder     prolapse   • Vitamin D deficiency      Past Surgical History:   Procedure Laterality Date   • SHOULDER ARTHROSCOPY W/ ROTATOR CUFF REPAIR  7/11/2012    Performed by SVETLANA MCLEOD at SURGERY [facility] ORS   • BICEPS TENDON REPAIR  7/11/2012    Performed by SVETLANA MCLEOD at SURGERY [facility] ORS   • SHOULDER DECOMPRESSION  7/11/2012    Performed by SVETLANA MCLEOD at SURGERY [facility] ORS   • VAGINAL HYSTERECTOMY SCOPE TOTAL  8/24/2011    Performed by QAMAR COPELAND at SURGERY SAME DAY [facility] ER ORS   • ANTERIOR AND POSTERIOR REPAIR  8/24/2011    Performed by QAMAR COPELAND at SURGERY SAME DAY [facility] ER ORS   • TUBAL LIGATION  1980   • OTHER      wisdom  teeth     Family History   Problem Relation Age of Onset   • Diabetes       History   Smoking Status   • Never Smoker   Smokeless Tobacco   • Never Used     Allergies   Allergen Reactions   • Iodine Rash     rash   • Mercury Rash   • Other Environmental      Grasses, trees, animal dander     Outpatient Encounter Prescriptions as of 11/7/2017   Medication Sig Dispense Refill   • SYNTHROID 50 MCG  "Tab      • RaNITidine HCl (ZANTAC PO) Take  by mouth.     • Fluticasone Propionate (FLONASE NA) Spray  in nose every day.     • [DISCONTINUED] azithromycin (ZITHROMAX) 250 MG Tab Take two tabs by mouth on day one, then one tab by mouth daily on days 2-5. 6 Tab 0   • oxycodone-acetaminophen (PERCOCET) 5-325 MG TABS Take 1-2 Tabs by mouth every four hours as needed for Mild Pain. 40 Each 0   • [DISCONTINUED] Polyethylene Glycol 3350 (MIRALAX PO) Take  by mouth as needed.     • [DISCONTINUED] Calcium Carbonate-Vitamin D (CALCIUM 500 + D PO) Take  by mouth every day.     • [DISCONTINUED] Multiple Vitamins-Calcium (ONE-A-DAY WOMENS FORMULA PO) Take  by mouth every day.     • [DISCONTINUED] vitamin D (CHOLECALCIFEROL) 1000 UNIT TABS Take 1,000 Units by mouth every day.       No facility-administered encounter medications on file as of 11/7/2017.      Review of Systems   Constitutional: Negative for chills and fever.   HENT: Negative for sore throat.    Eyes: Negative for blurred vision.   Respiratory: Negative for cough and shortness of breath.    Cardiovascular: Negative for chest pain, palpitations, claudication, leg swelling and PND.   Gastrointestinal: Positive for heartburn. Negative for abdominal pain and nausea.   Musculoskeletal: Negative for falls and joint pain.   Skin: Negative for rash.   Neurological: Negative for dizziness, focal weakness and weakness.   Endo/Heme/Allergies: Does not bruise/bleed easily.        Objective:   /80   Pulse 72   Ht 1.575 m (5' 2\")   Wt 54.4 kg (120 lb)   SpO2 95%   BMI 21.95 kg/m²     Physical Exam   Constitutional: No distress.   HENT:   Mouth/Throat: Oropharynx is clear and moist.   Eyes: No scleral icterus.   Cardiovascular: Normal rate, regular rhythm and intact distal pulses.  Exam reveals no gallop and no friction rub.    No murmur heard.  Pulmonary/Chest: Effort normal and breath sounds normal. She has no rales.   Abdominal: Bowel sounds are normal. There is no " tenderness.   Musculoskeletal: She exhibits no edema.   Neurological: She is alert.   Skin: No rash noted. She is not diaphoretic.   Psychiatric: She has a normal mood and affect.     Her EKG from primary care office shows sinus rhythm with normal axis and intervals this is similar to EKGs from 2011 and 12 while she was in the emergency room she did have sinus tachycardia    Reviewed her lab results from the emergency room which showed a white count and her lipid profiles have been normal    She also brought with her and EGD report  Assessment:     1. Establishing care with new doctor, encounter for         Medical Decision Making:  Today's Assessment / Status / Plan:     It was my pleasure to meet with Ms. Diaz.    She is accompanied by her     Fortunately she doesn't have any significant cardiac disease and is doing quite well appears her most important concern is osteopenia    Her discomfort could've been increased GERD related to the stress of pneumonia    Ms. Diaz does not require regular cardiology follow up, I have advised her to call our office or e-mail using my MyChart if needed.    It is my pleasure to participate in the care of Ms. Diaz.  Please do not hesitate to contact me with questions or concerns.    Jaiden Baldwin MD PhD FACC  Cardiologist [facility]

## 2018-12-13 RX ORDER — LEVOTHYROXINE SODIUM 50 MCG
50 TABLET ORAL DAILY
Qty: 30 TAB | Refills: 6 | Status: SHIPPED | OUTPATIENT
Start: 2018-12-13 | End: 2019-01-28 | Stop reason: SDUPTHER

## 2019-01-24 ENCOUNTER — OFFICE VISIT (OUTPATIENT)
Dept: INTERNAL MEDICINE | Facility: IMAGING CENTER | Age: 74
End: 2019-01-24
Payer: MEDICARE

## 2019-01-24 VITALS
HEIGHT: 61 IN | SYSTOLIC BLOOD PRESSURE: 142 MMHG | WEIGHT: 120 LBS | HEART RATE: 65 BPM | OXYGEN SATURATION: 97 % | TEMPERATURE: 98.7 F | BODY MASS INDEX: 22.66 KG/M2 | RESPIRATION RATE: 14 BRPM | DIASTOLIC BLOOD PRESSURE: 74 MMHG

## 2019-01-24 DIAGNOSIS — L57.0 ACTINIC KERATOSES: Chronic | ICD-10-CM

## 2019-01-24 DIAGNOSIS — K21.00 GASTROESOPHAGEAL REFLUX DISEASE WITH ESOPHAGITIS: Chronic | ICD-10-CM

## 2019-01-24 DIAGNOSIS — N81.10 VAGINAL PROLAPSE: Chronic | ICD-10-CM

## 2019-01-24 DIAGNOSIS — Z00.00 BLOOD TESTS FOR ROUTINE GENERAL PHYSICAL EXAMINATION: ICD-10-CM

## 2019-01-24 DIAGNOSIS — Z00.00 MEDICARE ANNUAL WELLNESS VISIT, INITIAL: ICD-10-CM

## 2019-01-24 DIAGNOSIS — Z85.89 HISTORY OF SQUAMOUS CELL CARCINOMA: Chronic | ICD-10-CM

## 2019-01-24 DIAGNOSIS — E03.9 ACQUIRED HYPOTHYROIDISM: Chronic | ICD-10-CM

## 2019-01-24 DIAGNOSIS — M81.0 OSTEOPOROSIS OF LOWER LEG WITHOUT PATHOLOGICAL FRACTURE: ICD-10-CM

## 2019-01-24 PROCEDURE — G0439 PPPS, SUBSEQ VISIT: HCPCS | Performed by: INTERNAL MEDICINE

## 2019-01-24 RX ORDER — FLUOROURACIL 50 MG/G
CREAM TOPICAL 2 TIMES DAILY
COMMUNITY
End: 2020-10-27

## 2019-01-24 RX ORDER — CALCIPOTRIENE 50 UG/G
CREAM TOPICAL 2 TIMES DAILY
COMMUNITY
End: 2020-10-27

## 2019-01-24 ASSESSMENT — ACTIVITIES OF DAILY LIVING (ADL): BATHING_REQUIRES_ASSISTANCE: 0

## 2019-01-24 ASSESSMENT — PATIENT HEALTH QUESTIONNAIRE - PHQ9
CLINICAL INTERPRETATION OF PHQ2 SCORE: 0
SUM OF ALL RESPONSES TO PHQ QUESTIONS 1-9: 0

## 2019-01-24 ASSESSMENT — ENCOUNTER SYMPTOMS: GENERAL WELL-BEING: EXCELLENT

## 2019-01-24 NOTE — PROGRESS NOTES
CC:establish care    HPI:   Kimmy presents today with the following.    1. Osteoporosis of lower leg without pathological fracture  H/O osteoporosis. Took Fosemax ~ 10 years ago. Last DEXA 2015    2. Actinic keratoses  Sees derm and uses 2 Rx creams. (Dr. Matos)    3. History of squamous cell carcinoma  Dr. Kyle did a MOHS on her scalp a few months ago.    4. Acquired hypothyroidism  Has been on same dose of levithyroxine for many years.    5. Medicare annual wellness visit, initial    6. Vaginal prolapse  Had hysterectomy in 2011. Prolapse and cystocoele repair at that time. She believes she has had some recurrence of prolapse, but no significant symptoms at this time.    7. Gastroesophageal reflux disease with esophagitis  Takes an occasional zantac. Symptoms fairly isolated.    8. Blood tests for routine general physical examination        Patient Active Problem List    Diagnosis Date Noted   • Actinic keratoses 01/24/2019   • History of squamous cell carcinoma 01/24/2019   • Acquired hypothyroidism 01/24/2019   • Vaginal prolapse 01/24/2019   • Gastroesophageal reflux disease with esophagitis 01/24/2019   • Osteoporosis of lower leg without pathological fracture 07/10/2011       Past medical, surgical, family, and social history was reviewed and updated in EPIC chart by me today.     Medications and allergies reviewed and updated in EPIC chart by me today.     Labs and applicable imaging discussed and reviewed with patient.     ROS: Pertinent positives listed above in HPI. All other systems have been reviewed and are negative.     Current Outpatient Prescriptions   Medication Sig Dispense Refill   • calcipotriene (DOVONEX) 0.005 % Cream Apply  to affected area(s) 2 times a day.     • fluorouracil (EFUDEX) 5 % cream Apply  to affected area(s) 2 times a day.     • SYNTHROID 50 MCG Tab Take 1 Tab by mouth every day. 30 Tab 6   • RaNITidine HCl (ZANTAC PO) Take  by mouth.     • Fluticasone Propionate (FLONASE NA)  "Spray  in nose every day.       No current facility-administered medications for this visit.          Allergies as of 01/24/2019 - Reviewed 01/24/2019   Allergen Reaction Noted   • Iodine Rash 07/11/2012   • Mercury Rash 06/29/2011   • Other environmental  08/18/2011          /74   Pulse 65   Temp 37.1 °C (98.7 °F) (Temporal)   Resp 14   Ht 1.549 m (5' 1\")   Wt 54.4 kg (120 lb)   SpO2 97%   BMI 22.67 kg/m²     Physical Exam:  Gen:      Alert and oriented, No apparent distress.  HEET:   No Lymphadenopathy or Bruits.  NECK:  No adenopathy; no thyromegaly  Lungs:  Clear to auscultation bilaterally  Breast exam: no mass; no d/c; no adenopathy; no dimpling. Breast exam method reviewed with patient.  CV:       Regular rate and rhythm. No murmurs, rubs or gallops.          Abd:      Adequate bowel sounds; soft and nontender; no rebound, rigidity, nor distention  Ext:       No clubbing, cyanosis, edema. Good PT pulses.  Skin:     Warm and dry; no rashes noted. Note SKs on back.  Neuro:  No focal deficits noted  Psych:  AAOx4; mood and affect are appropriate  Chart reviewed including ED visit for pneumonia 7/17. @015 DEXA also reviewed.      Assessment and Plan.   73 y.o. female with the following issues.    1. Osteoporosis of lower leg without pathological fracture  She will resume vitamin D3 at 2000 IU/day as she has a h/o vitamin D deficiency. She will also take 500 mg calcium daily. Will be reviewing DEXA in follow up.  - DS-BONE DENSITY STUDY (DEXA); Future  - CMP14+LP    2. Actinic keratoses  She maintains frequent Derm f/u.    3. History of squamous cell carcinoma  Has f/u scheduled.    4. Acquired hypothyroidism  Will f/u after lab.  - CMP14+LP  - TSH+FREE T4    5. Medicare annual wellness visit, initial  - Initial Annual Wellness Visit - Includes PPPS ()    6. Vaginal prolapse  No symptoms at this time.    7. Gastroesophageal reflux disease with esophagitis  Doing well with prn zantac    8. Blood " tests for routine general physical examination  - CMP14+LP  - CBC WITHOUT DIFFERENTIAL; Future      Face to face time: 45 minutes with greater than 50% of time spent with direct patient contact and medical management.     Depression Screening    Little interest or pleasure in doing things?  0 - not at all  Feeling down, depressed , or hopeless? 0 - not at all  Patient Health Questionnaire Score: 0     If depressive symptoms identified deferred to follow up visit unless specifically addressed in assessment and plan.    Interpretation of PHQ-9 Total Score   Score Severity   1-4 No Depression   5-9 Mild Depression   10-14 Moderate Depression   15-19 Moderately Severe Depression   20-27 Severe Depression    Screening for Cognitive Impairment    Three Minute Recall (leader, season, table) 2/3    Ramesh clock face with all 12 numbers and set the hands to show 10 past 11.  No    Cognitive concerns identified deferred for follow up unless specifically addressed in assessment and plan.    Fall Risk Assessment    Has the patient had two or more falls in the last year or any fall with injury in the last year?  No    Safety Assessment    Throw rugs on floor.  No  Handrails on all stairs.  No  Good lighting in all hallways.  Yes  Difficulty hearing.  No  Patient counseled about all safety risks that were identified.    Functional Assessment ADLs    Are there any barriers preventing you from cooking for yourself or meeting nutritional needs?  No.    Are there any barriers preventing you from driving safely or obtaining transportation?  No.    Are there any barriers preventing you from using a telephone or calling for help?  No.    Are there any barriers preventing you from shopping?  No.    Are there any barriers preventing you from taking care of your own finances?  No.    Are there any barriers preventing you from managing your medications?  No.    Are there any barriers preventing you from showering, bathing or dressing  yourself?  No.    Are you currently engaging in any exercise or physical activity?  Yes.     What is your perception of your health?  Excellent.      Health Maintenance Summary                Annual Wellness Visit Overdue 1945     COLONOSCOPY Overdue 2/10/1996     IMM ZOSTER VACCINES Overdue 2/10/1996     MAMMOGRAM Next Due 1/8/2020      Done 1/8/2019 [id number]     Patient has more history with this topic...    BONE DENSITY Next Due 8/24/2020      Done 8/24/2015 DS-BONE DENSITY STUDY (DEXA)     Patient has more history with this topic...    IMM DTaP/Tdap/Td Vaccine Next Due 8/25/2026      Done 8/25/2016 Imm Admin: Tdap Vaccine          Patient Care Team:  Eddie Greene M.D. as PCP - General (Internal Medicine)

## 2019-01-28 ENCOUNTER — HOSPITAL ENCOUNTER (OUTPATIENT)
Facility: MEDICAL CENTER | Age: 74
End: 2019-01-28
Attending: INTERNAL MEDICINE
Payer: MEDICARE

## 2019-01-28 ENCOUNTER — NON-PROVIDER VISIT (OUTPATIENT)
Dept: INTERNAL MEDICINE | Facility: IMAGING CENTER | Age: 74
End: 2019-01-28
Payer: MEDICARE

## 2019-01-28 DIAGNOSIS — Z00.00 BLOOD TESTS FOR ROUTINE GENERAL PHYSICAL EXAMINATION: ICD-10-CM

## 2019-01-28 DIAGNOSIS — Z01.89 ENCOUNTER FOR ROUTINE LABORATORY TESTING: ICD-10-CM

## 2019-01-28 LAB
ALBUMIN SERPL BCP-MCNC: 4.7 G/DL (ref 3.2–4.9)
ALBUMIN/GLOB SERPL: 1.6 G/DL
ALP SERPL-CCNC: 55 U/L (ref 30–99)
ALT SERPL-CCNC: 16 U/L (ref 2–50)
ANION GAP SERPL CALC-SCNC: 8 MMOL/L (ref 0–11.9)
AST SERPL-CCNC: 25 U/L (ref 12–45)
BILIRUB SERPL-MCNC: 0.8 MG/DL (ref 0.1–1.5)
BUN SERPL-MCNC: 18 MG/DL (ref 8–22)
CALCIUM SERPL-MCNC: 9.8 MG/DL (ref 8.5–10.5)
CHLORIDE SERPL-SCNC: 103 MMOL/L (ref 96–112)
CHOLEST SERPL-MCNC: 202 MG/DL (ref 100–199)
CO2 SERPL-SCNC: 29 MMOL/L (ref 20–33)
CREAT SERPL-MCNC: 0.78 MG/DL (ref 0.5–1.4)
ERYTHROCYTE [DISTWIDTH] IN BLOOD BY AUTOMATED COUNT: 42.5 FL (ref 35.9–50)
GLOBULIN SER CALC-MCNC: 3 G/DL (ref 1.9–3.5)
GLUCOSE SERPL-MCNC: 99 MG/DL (ref 65–99)
HCT VFR BLD AUTO: 46.9 % (ref 37–47)
HDLC SERPL-MCNC: 54 MG/DL
HGB BLD-MCNC: 15.2 G/DL (ref 12–16)
LDLC SERPL CALC-MCNC: 120 MG/DL
MCH RBC QN AUTO: 29.6 PG (ref 27–33)
MCHC RBC AUTO-ENTMCNC: 32.4 G/DL (ref 33.6–35)
MCV RBC AUTO: 91.4 FL (ref 81.4–97.8)
PLATELET # BLD AUTO: 165 K/UL (ref 164–446)
PMV BLD AUTO: 12.3 FL (ref 9–12.9)
POTASSIUM SERPL-SCNC: 3.8 MMOL/L (ref 3.6–5.5)
PROT SERPL-MCNC: 7.7 G/DL (ref 6–8.2)
RBC # BLD AUTO: 5.13 M/UL (ref 4.2–5.4)
SODIUM SERPL-SCNC: 140 MMOL/L (ref 135–145)
T4 FREE SERPL-MCNC: 1.18 NG/DL (ref 0.53–1.43)
TRIGL SERPL-MCNC: 142 MG/DL (ref 0–149)
TSH SERPL DL<=0.005 MIU/L-ACNC: 1.37 UIU/ML (ref 0.38–5.33)
WBC # BLD AUTO: 6.9 K/UL (ref 4.8–10.8)

## 2019-01-28 PROCEDURE — 85027 COMPLETE CBC AUTOMATED: CPT

## 2019-01-28 PROCEDURE — 84439 ASSAY OF FREE THYROXINE: CPT

## 2019-01-28 PROCEDURE — 80061 LIPID PANEL: CPT

## 2019-01-28 PROCEDURE — 80053 COMPREHEN METABOLIC PANEL: CPT

## 2019-01-28 PROCEDURE — 84443 ASSAY THYROID STIM HORMONE: CPT

## 2019-01-28 RX ORDER — LEVOTHYROXINE SODIUM 50 MCG
50 TABLET ORAL DAILY
Qty: 90 TAB | Refills: 3 | Status: SHIPPED | OUTPATIENT
Start: 2019-01-28 | End: 2020-01-10 | Stop reason: SDUPTHER

## 2019-03-18 ENCOUNTER — HOSPITAL ENCOUNTER (OUTPATIENT)
Dept: RADIOLOGY | Facility: MEDICAL CENTER | Age: 74
End: 2019-03-18
Attending: INTERNAL MEDICINE
Payer: MEDICARE

## 2019-03-18 DIAGNOSIS — M81.0 OSTEOPOROSIS OF LOWER LEG WITHOUT PATHOLOGICAL FRACTURE: ICD-10-CM

## 2019-03-18 PROCEDURE — 77080 DXA BONE DENSITY AXIAL: CPT

## 2019-03-26 ENCOUNTER — TELEPHONE (OUTPATIENT)
Dept: CARDIOLOGY | Facility: MEDICAL CENTER | Age: 74
End: 2019-03-26

## 2019-03-26 DIAGNOSIS — Z13.6 SCREENING FOR CARDIOVASCULAR CONDITION: ICD-10-CM

## 2019-03-26 NOTE — TELEPHONE ENCOUNTER
Seeing  today and she would like a vascular screen    It is my pleasure to participate in the care of Ms. Diaz.  Please do not hesitate to contact me with questions or concerns.    Jaiden Baldwin MD PhD [facility]  Cardiologist [facility]    Please note that this dictation was created using voice recognition software. I have worked with consultants from the vendor as well as technical experts from [facility] to optimize the interface. I have made every reasonable attempt to correct obvious errors, but I expect that there are errors of grammar and possibly content I did not discover before finalizing the note.

## 2019-05-23 ENCOUNTER — OFFICE VISIT (OUTPATIENT)
Dept: INTERNAL MEDICINE | Facility: IMAGING CENTER | Age: 74
End: 2019-05-23
Payer: MEDICARE

## 2019-05-23 VITALS
OXYGEN SATURATION: 97 % | WEIGHT: 120 LBS | BODY MASS INDEX: 22.67 KG/M2 | HEART RATE: 62 BPM | SYSTOLIC BLOOD PRESSURE: 110 MMHG | TEMPERATURE: 98 F | DIASTOLIC BLOOD PRESSURE: 70 MMHG

## 2019-05-23 DIAGNOSIS — M81.0 OSTEOPOROSIS OF LOWER LEG WITHOUT PATHOLOGICAL FRACTURE: ICD-10-CM

## 2019-05-23 DIAGNOSIS — E03.9 ACQUIRED HYPOTHYROIDISM: Chronic | ICD-10-CM

## 2019-05-23 DIAGNOSIS — E78.00 PURE HYPERCHOLESTEROLEMIA: Chronic | ICD-10-CM

## 2019-05-23 DIAGNOSIS — Z85.89 HISTORY OF SQUAMOUS CELL CARCINOMA: Chronic | ICD-10-CM

## 2019-05-23 DIAGNOSIS — Z13.6 SCREENING FOR CARDIOVASCULAR CONDITION: ICD-10-CM

## 2019-05-23 PROCEDURE — 99214 OFFICE O/P EST MOD 30 MIN: CPT | Performed by: INTERNAL MEDICINE

## 2019-05-23 NOTE — PROGRESS NOTES
CC: follow up    HPI:   Kimmy presents today with the following.    1. Acquired hypothyroidism  On stable dose of levothyroxine    2. History of squamous cell carcinoma  Sees a dermatologist every 4-6 months. She uses effudex prn per instructions.    3. Osteoporosis of lower leg without pathological fracture  Discussed below.    4. Pure hypercholesterolemia  See below. No FH for CAD that she is aware of.    5. Screening for cardiovascular condition  Discussed CCT      Patient Active Problem List    Diagnosis Date Noted   • Pure hypercholesterolemia 05/23/2019   • Actinic keratoses 01/24/2019   • History of squamous cell carcinoma 01/24/2019   • Acquired hypothyroidism 01/24/2019   • Vaginal prolapse 01/24/2019   • Gastroesophageal reflux disease with esophagitis 01/24/2019   • Osteoporosis of lower leg without pathological fracture 07/10/2011       Past medical, surgical, family, and social history was reviewed and updated in EPIC chart by me today.     Medications and allergies reviewed and updated in EPIC chart by me today.     Labs and applicable imaging discussed and reviewed with patient.     ROS: Pertinent positives listed above in HPI. All other systems have been reviewed and are stable.     Current Outpatient Prescriptions   Medication Sig Dispense Refill   • SYNTHROID 50 MCG Tab Take 1 Tab by mouth every day. 90 Tab 3   • calcipotriene (DOVONEX) 0.005 % Cream Apply  to affected area(s) 2 times a day.     • fluorouracil (EFUDEX) 5 % cream Apply  to affected area(s) 2 times a day.     • RaNITidine HCl (ZANTAC PO) Take  by mouth.     • Fluticasone Propionate (FLONASE NA) Spray  in nose every day.       No current facility-administered medications for this visit.          Allergies as of 05/23/2019 - Reviewed 05/23/2019   Allergen Reaction Noted   • Iodine Rash 07/11/2012   • Mercury Rash 06/29/2011   • Other environmental  08/18/2011        /70   Pulse 62   Temp 36.7 °C (98 °F)   Wt 54.4 kg (120 lb)    SpO2 97%   BMI 22.67 kg/m²     Physical Exam:  Gen:      Alert and oriented, No apparent distress.  Neuro:  No focal deficits noted  Psych:  AAOx4; mood and affect are appropriate  Lab from January was reviewed with her and essentially CBC, CMP and thyroid are WNL. Her T cholesterol is 202 and .  Mammogram in January was negative for malignancy.    Assessment and Plan.   74 y.o. female with the following issues.    1. Acquired hypothyroidism  Euthyroid on current dose    2. History of squamous cell carcinoma  Maintains derm f/u    3. Osteoporosis of lower leg without pathological fracture  Discussed at length with her. There has been a bit of increase in the osteoporosis of her left femur. L spine was a bit improved. She has had Prolia presented to her before and I reviewed benefits and potential adverse side effects. She declines at this point. She will continue with her exercise (treadmill 5 days/week plus vvery active and 1-2 times/week at the gym). She does take 2000 IU of vitamin D. She will also take 500 mg of calcium.    4. Pure hypercholesterolemia  Will check Cardiac scoring and then discuss with her if statin recommended.    5. Screening for cardiovascular condition  As per #5  - CT-CARDIAC SCORING; Future      Face to face time: 45 minutes with greater than 50% of time spent with direct patient contact and medical management.

## 2019-08-09 ENCOUNTER — HOSPITAL ENCOUNTER (OUTPATIENT)
Dept: RADIOLOGY | Facility: MEDICAL CENTER | Age: 74
End: 2019-08-09
Attending: INTERNAL MEDICINE
Payer: COMMERCIAL

## 2019-08-09 DIAGNOSIS — Z13.6 SCREENING FOR CARDIOVASCULAR CONDITION: ICD-10-CM

## 2019-08-09 PROCEDURE — 306723 US-TOTAL VASCULAR SCREENING (S/P)

## 2019-08-09 PROCEDURE — 0126T US-CIMT DUPLEX: CPT

## 2019-08-09 PROCEDURE — 0126T US-CIMT DUPLEX: CPT | Performed by: INTERNAL MEDICINE

## 2019-08-12 ENCOUNTER — TELEPHONE (OUTPATIENT)
Dept: CARDIOLOGY | Facility: MEDICAL CENTER | Age: 74
End: 2019-08-12

## 2019-08-27 ENCOUNTER — HOSPITAL ENCOUNTER (OUTPATIENT)
Dept: RADIOLOGY | Facility: MEDICAL CENTER | Age: 74
End: 2019-08-27
Attending: INTERNAL MEDICINE
Payer: COMMERCIAL

## 2019-08-27 DIAGNOSIS — Z13.6 SCREENING FOR CARDIOVASCULAR CONDITION: ICD-10-CM

## 2019-08-27 PROCEDURE — 4410556 CT-CARDIAC SCORING

## 2019-10-02 ENCOUNTER — NON-PROVIDER VISIT (OUTPATIENT)
Dept: INTERNAL MEDICINE | Facility: IMAGING CENTER | Age: 74
End: 2019-10-02
Payer: MEDICARE

## 2019-10-02 DIAGNOSIS — Z23 NEED FOR INFLUENZA VACCINATION: ICD-10-CM

## 2019-10-02 PROCEDURE — G0008 ADMIN INFLUENZA VIRUS VAC: HCPCS | Performed by: INTERNAL MEDICINE

## 2019-10-02 PROCEDURE — 90662 IIV NO PRSV INCREASED AG IM: CPT | Performed by: INTERNAL MEDICINE

## 2019-11-15 ENCOUNTER — OFFICE VISIT (OUTPATIENT)
Dept: INTERNAL MEDICINE | Facility: IMAGING CENTER | Age: 74
End: 2019-11-15
Payer: MEDICARE

## 2019-11-15 ENCOUNTER — HOSPITAL ENCOUNTER (OUTPATIENT)
Facility: MEDICAL CENTER | Age: 74
End: 2019-11-15
Attending: INTERNAL MEDICINE
Payer: MEDICARE

## 2019-11-15 ENCOUNTER — HOSPITAL ENCOUNTER (OUTPATIENT)
Dept: RADIOLOGY | Facility: MEDICAL CENTER | Age: 74
End: 2019-11-15
Attending: INTERNAL MEDICINE
Payer: MEDICARE

## 2019-11-15 VITALS
TEMPERATURE: 101.5 F | HEIGHT: 61 IN | BODY MASS INDEX: 22.66 KG/M2 | SYSTOLIC BLOOD PRESSURE: 110 MMHG | WEIGHT: 120 LBS | RESPIRATION RATE: 14 BRPM | OXYGEN SATURATION: 93 % | DIASTOLIC BLOOD PRESSURE: 60 MMHG | HEART RATE: 98 BPM

## 2019-11-15 DIAGNOSIS — R50.9 COUGH WITH FEVER: ICD-10-CM

## 2019-11-15 DIAGNOSIS — J18.9 PNEUMONIA OF RIGHT MIDDLE LOBE DUE TO INFECTIOUS ORGANISM: ICD-10-CM

## 2019-11-15 DIAGNOSIS — R05.9 COUGH WITH FEVER: ICD-10-CM

## 2019-11-15 LAB
GRAM STN SPEC: NORMAL
SIGNIFICANT IND 70042: NORMAL
SITE SITE: NORMAL
SOURCE SOURCE: NORMAL

## 2019-11-15 PROCEDURE — 99213 OFFICE O/P EST LOW 20 MIN: CPT | Mod: 25 | Performed by: INTERNAL MEDICINE

## 2019-11-15 PROCEDURE — 87070 CULTURE OTHR SPECIMN AEROBIC: CPT

## 2019-11-15 PROCEDURE — 71046 X-RAY EXAM CHEST 2 VIEWS: CPT

## 2019-11-15 PROCEDURE — 87205 SMEAR GRAM STAIN: CPT

## 2019-11-15 PROCEDURE — 96372 THER/PROPH/DIAG INJ SC/IM: CPT | Performed by: INTERNAL MEDICINE

## 2019-11-15 RX ORDER — AMOXICILLIN AND CLAVULANATE POTASSIUM 500; 125 MG/1; MG/1
1 TABLET, FILM COATED ORAL 3 TIMES DAILY
Qty: 30 TAB | Refills: 0 | Status: SHIPPED | OUTPATIENT
Start: 2019-11-15 | End: 2020-05-07

## 2019-11-15 NOTE — PROGRESS NOTES
CC: cough and fever    HPI:   Kimmy presents today with the following.    1. Pneumonia of right middle lobe due to infectious organism (HCC)  ~ 2 week history of URI symptoms but for past 48, increase cough and sputum and past 24 hours with fever. Denies Sob.  - cefTRIAXone (ROCEPHIN) 1 g, lidocaine (XYLOCAINE) 1 % 3.6 mL for IM use      Patient Active Problem List    Diagnosis Date Noted   • Pure hypercholesterolemia 05/23/2019   • Actinic keratoses 01/24/2019   • History of squamous cell carcinoma 01/24/2019   • Acquired hypothyroidism 01/24/2019   • Vaginal prolapse 01/24/2019   • Gastroesophageal reflux disease with esophagitis 01/24/2019   • Osteoporosis of lower leg without pathological fracture 07/10/2011       Past medical, surgical, family, and social history was reviewed and updated in EPIC chart by me today.     Medications and allergies reviewed and updated in EPIC chart by me today.     Labs and applicable imaging discussed and reviewed with patient.     ROS: Pertinent positives listed above in HPI. All other systems have been reviewed and are STABLE.     Current Outpatient Medications   Medication Sig Dispense Refill   • amoxicillin-clavulanate (AUGMENTIN) 500-125 MG Tab Take 1 Tab by mouth 3 times a day. 30 Tab 0   • SYNTHROID 50 MCG Tab Take 1 Tab by mouth every day. 90 Tab 3   • calcipotriene (DOVONEX) 0.005 % Cream Apply  to affected area(s) 2 times a day.     • fluorouracil (EFUDEX) 5 % cream Apply  to affected area(s) 2 times a day.     • Fluticasone Propionate (FLONASE NA) Spray  in nose every day.       No current facility-administered medications for this visit.          Allergies as of 11/15/2019 - Reviewed 11/15/2019   Allergen Reaction Noted   • Iodine Rash 07/11/2012   • Mercury Rash 06/29/2011   • Other environmental  08/18/2011          /60 (BP Location: Left arm, Patient Position: Sitting, BP Cuff Size: Adult)   Pulse 98   Temp (!) 38.6 °C (101.5 °F) (Temporal)   Resp 14   Ht  "1.549 m (5' 1\")   Wt 54.4 kg (120 lb)   SpO2 93%   BMI 22.67 kg/m²     Physical Exam:  Gen:      Alert and oriented, she is not in distress but does appear acutely ill.  HEET:   No Lymphadenopathy or Bruits.  NECK:  No adenopathy; no thyromegaly  Lungs:  Diffuse rhonchi. No sign of respiratory distress  CV:       Regular rate and rhythm.   Ext:       No clubbing, cyanosis, edema.  Skin:     Warm and dry; no rashes noted  Neuro:  No focal deficits noted. Her conversation is normal.  Chest xray this a.m shows a RML infiltrate  Sputum is thick and gray. Specimen sent for C7S  Assessment and Plan.   74 y.o. female with the following issues.    1. Pneumonia of right middle lobe due to infectious organism (HCC)  - amoxicillin-clavulanate (AUGMENTIN) 500-125 MG Tab; Take 1 Tab by mouth 3 times a day.  Dispense: 30 Tab; Refill: 0  - CULTURE RESPIRATORY W/ GRM STN; Future  - cefTRIAXone (ROCEPHIN) 1 g, lidocaine (XYLOCAINE) 1 % 3.6 mL for IM use  I will call her this afternoon and tomorrow a.m.      "

## 2019-11-17 LAB
BACTERIA SPEC RESP CULT: NORMAL
GRAM STN SPEC: NORMAL
SIGNIFICANT IND 70042: NORMAL
SITE SITE: NORMAL
SOURCE SOURCE: NORMAL

## 2019-11-18 ENCOUNTER — NON-PROVIDER VISIT (OUTPATIENT)
Dept: INTERNAL MEDICINE | Facility: IMAGING CENTER | Age: 74
End: 2019-11-18
Payer: MEDICARE

## 2019-11-18 DIAGNOSIS — K52.1 DIARRHEA DUE TO DRUG: ICD-10-CM

## 2019-11-18 DIAGNOSIS — J18.9 PNEUMONIA OF RIGHT MIDDLE LOBE DUE TO INFECTIOUS ORGANISM: ICD-10-CM

## 2019-11-18 PROCEDURE — 96372 THER/PROPH/DIAG INJ SC/IM: CPT | Performed by: INTERNAL MEDICINE

## 2019-11-19 ENCOUNTER — NON-PROVIDER VISIT (OUTPATIENT)
Dept: INTERNAL MEDICINE | Facility: IMAGING CENTER | Age: 74
End: 2019-11-19
Payer: MEDICARE

## 2019-11-19 DIAGNOSIS — K52.1 DIARRHEA DUE TO DRUG: ICD-10-CM

## 2019-11-19 DIAGNOSIS — J18.9 PNEUMONIA OF RIGHT MIDDLE LOBE DUE TO INFECTIOUS ORGANISM: ICD-10-CM

## 2019-11-19 PROCEDURE — 96372 THER/PROPH/DIAG INJ SC/IM: CPT | Performed by: INTERNAL MEDICINE

## 2019-11-20 ENCOUNTER — NON-PROVIDER VISIT (OUTPATIENT)
Dept: INTERNAL MEDICINE | Facility: IMAGING CENTER | Age: 74
End: 2019-11-20
Payer: MEDICARE

## 2019-11-20 DIAGNOSIS — J18.9 PNEUMONIA OF RIGHT MIDDLE LOBE DUE TO INFECTIOUS ORGANISM: ICD-10-CM

## 2019-11-20 DIAGNOSIS — K52.1 DIARRHEA DUE TO DRUG: ICD-10-CM

## 2019-11-20 PROCEDURE — 96372 THER/PROPH/DIAG INJ SC/IM: CPT | Performed by: INTERNAL MEDICINE

## 2019-11-21 ENCOUNTER — HOSPITAL ENCOUNTER (OUTPATIENT)
Facility: MEDICAL CENTER | Age: 74
End: 2019-11-21
Attending: INTERNAL MEDICINE
Payer: MEDICARE

## 2019-11-21 DIAGNOSIS — K52.1 DIARRHEA DUE TO DRUG: ICD-10-CM

## 2019-11-21 PROCEDURE — 87493 C DIFF AMPLIFIED PROBE: CPT

## 2019-11-22 LAB
C DIFF DNA SPEC QL NAA+PROBE: NEGATIVE
C DIFF TOX GENS STL QL NAA+PROBE: NEGATIVE

## 2020-01-10 RX ORDER — LEVOTHYROXINE SODIUM 50 MCG
50 TABLET ORAL DAILY
Qty: 90 TAB | Refills: 3 | Status: SHIPPED | OUTPATIENT
Start: 2020-01-10 | End: 2020-05-08 | Stop reason: SDUPTHER

## 2020-04-20 DIAGNOSIS — D72.829 LEUKOCYTOSIS, UNSPECIFIED TYPE: ICD-10-CM

## 2020-04-20 DIAGNOSIS — M81.0 OSTEOPOROSIS OF LOWER LEG WITHOUT PATHOLOGICAL FRACTURE: ICD-10-CM

## 2020-04-20 DIAGNOSIS — R35.0 URINE FREQUENCY: ICD-10-CM

## 2020-04-20 DIAGNOSIS — E78.00 PURE HYPERCHOLESTEROLEMIA: ICD-10-CM

## 2020-04-20 DIAGNOSIS — E03.9 ACQUIRED HYPOTHYROIDISM: ICD-10-CM

## 2020-04-28 ENCOUNTER — NON-PROVIDER VISIT (OUTPATIENT)
Dept: INTERNAL MEDICINE | Facility: IMAGING CENTER | Age: 75
End: 2020-04-28
Payer: MEDICARE

## 2020-04-28 ENCOUNTER — HOSPITAL ENCOUNTER (OUTPATIENT)
Facility: MEDICAL CENTER | Age: 75
End: 2020-04-28
Attending: INTERNAL MEDICINE
Payer: MEDICARE

## 2020-04-28 DIAGNOSIS — R35.0 URINE FREQUENCY: ICD-10-CM

## 2020-04-28 DIAGNOSIS — M81.0 OSTEOPOROSIS OF LOWER LEG WITHOUT PATHOLOGICAL FRACTURE: ICD-10-CM

## 2020-04-28 DIAGNOSIS — E78.00 PURE HYPERCHOLESTEROLEMIA: ICD-10-CM

## 2020-04-28 DIAGNOSIS — D72.829 LEUKOCYTOSIS, UNSPECIFIED TYPE: ICD-10-CM

## 2020-04-28 DIAGNOSIS — E03.9 ACQUIRED HYPOTHYROIDISM: ICD-10-CM

## 2020-04-28 LAB
25(OH)D3 SERPL-MCNC: 47 NG/ML (ref 30–100)
ALBUMIN SERPL BCP-MCNC: 4.6 G/DL (ref 3.2–4.9)
ALBUMIN/GLOB SERPL: 1.4 G/DL
ALP SERPL-CCNC: 69 U/L (ref 30–99)
ALT SERPL-CCNC: 12 U/L (ref 2–50)
ANION GAP SERPL CALC-SCNC: 10 MMOL/L (ref 7–16)
APPEARANCE UR: CLEAR
AST SERPL-CCNC: 25 U/L (ref 12–45)
BACTERIA #/AREA URNS HPF: ABNORMAL /HPF
BASOPHILS # BLD AUTO: 0.5 % (ref 0–1.8)
BASOPHILS # BLD: 0.03 K/UL (ref 0–0.12)
BILIRUB SERPL-MCNC: 0.6 MG/DL (ref 0.1–1.5)
BILIRUB UR QL STRIP.AUTO: NEGATIVE
BUN SERPL-MCNC: 22 MG/DL (ref 8–22)
CALCIUM SERPL-MCNC: 9.6 MG/DL (ref 8.5–10.5)
CHLORIDE SERPL-SCNC: 97 MMOL/L (ref 96–112)
CHOLEST SERPL-MCNC: 190 MG/DL (ref 100–199)
CO2 SERPL-SCNC: 27 MMOL/L (ref 20–33)
COLOR UR: YELLOW
CREAT SERPL-MCNC: 0.82 MG/DL (ref 0.5–1.4)
EOSINOPHIL # BLD AUTO: 0.05 K/UL (ref 0–0.51)
EOSINOPHIL NFR BLD: 0.9 % (ref 0–6.9)
EPI CELLS #/AREA URNS HPF: NEGATIVE /HPF
ERYTHROCYTE [DISTWIDTH] IN BLOOD BY AUTOMATED COUNT: 42.2 FL (ref 35.9–50)
GLOBULIN SER CALC-MCNC: 3.2 G/DL (ref 1.9–3.5)
GLUCOSE SERPL-MCNC: 99 MG/DL (ref 65–99)
GLUCOSE UR STRIP.AUTO-MCNC: NEGATIVE MG/DL
HCT VFR BLD AUTO: 47.1 % (ref 37–47)
HDLC SERPL-MCNC: 48 MG/DL
HGB BLD-MCNC: 15.3 G/DL (ref 12–16)
HYALINE CASTS #/AREA URNS LPF: ABNORMAL /LPF
IMM GRANULOCYTES # BLD AUTO: 0.01 K/UL (ref 0–0.11)
IMM GRANULOCYTES NFR BLD AUTO: 0.2 % (ref 0–0.9)
KETONES UR STRIP.AUTO-MCNC: NEGATIVE MG/DL
LDLC SERPL CALC-MCNC: 114 MG/DL
LEUKOCYTE ESTERASE UR QL STRIP.AUTO: ABNORMAL
LYMPHOCYTES # BLD AUTO: 1.73 K/UL (ref 1–4.8)
LYMPHOCYTES NFR BLD: 31.5 % (ref 22–41)
MCH RBC QN AUTO: 29.1 PG (ref 27–33)
MCHC RBC AUTO-ENTMCNC: 32.5 G/DL (ref 33.6–35)
MCV RBC AUTO: 89.7 FL (ref 81.4–97.8)
MICRO URNS: ABNORMAL
MONOCYTES # BLD AUTO: 0.42 K/UL (ref 0–0.85)
MONOCYTES NFR BLD AUTO: 7.7 % (ref 0–13.4)
NEUTROPHILS # BLD AUTO: 3.25 K/UL (ref 2–7.15)
NEUTROPHILS NFR BLD: 59.2 % (ref 44–72)
NITRITE UR QL STRIP.AUTO: NEGATIVE
NRBC # BLD AUTO: 0 K/UL
NRBC BLD-RTO: 0 /100 WBC
PH UR STRIP.AUTO: 5 [PH] (ref 5–8)
PLATELET # BLD AUTO: 155 K/UL (ref 164–446)
PMV BLD AUTO: 11.8 FL (ref 9–12.9)
POTASSIUM SERPL-SCNC: 3.8 MMOL/L (ref 3.6–5.5)
PROT SERPL-MCNC: 7.8 G/DL (ref 6–8.2)
PROT UR QL STRIP: NEGATIVE MG/DL
RBC # BLD AUTO: 5.25 M/UL (ref 4.2–5.4)
RBC # URNS HPF: ABNORMAL /HPF
RBC UR QL AUTO: ABNORMAL
SODIUM SERPL-SCNC: 134 MMOL/L (ref 135–145)
SP GR UR STRIP.AUTO: 1.02
T4 FREE SERPL-MCNC: 1.18 NG/DL (ref 0.53–1.43)
TRIGL SERPL-MCNC: 142 MG/DL (ref 0–149)
TSH SERPL DL<=0.005 MIU/L-ACNC: 1.2 UIU/ML (ref 0.38–5.33)
UROBILINOGEN UR STRIP.AUTO-MCNC: 0.2 MG/DL
WBC # BLD AUTO: 5.5 K/UL (ref 4.8–10.8)
WBC #/AREA URNS HPF: ABNORMAL /HPF

## 2020-04-28 PROCEDURE — 87077 CULTURE AEROBIC IDENTIFY: CPT

## 2020-04-28 PROCEDURE — 80061 LIPID PANEL: CPT

## 2020-04-28 PROCEDURE — 84439 ASSAY OF FREE THYROXINE: CPT

## 2020-04-28 PROCEDURE — 80053 COMPREHEN METABOLIC PANEL: CPT

## 2020-04-28 PROCEDURE — 81001 URINALYSIS AUTO W/SCOPE: CPT

## 2020-04-28 PROCEDURE — 85025 COMPLETE CBC W/AUTO DIFF WBC: CPT

## 2020-04-28 PROCEDURE — 82306 VITAMIN D 25 HYDROXY: CPT

## 2020-04-28 PROCEDURE — 84443 ASSAY THYROID STIM HORMONE: CPT

## 2020-04-28 PROCEDURE — 87186 SC STD MICRODIL/AGAR DIL: CPT

## 2020-04-28 PROCEDURE — 87086 URINE CULTURE/COLONY COUNT: CPT

## 2020-04-30 LAB
BACTERIA UR CULT: ABNORMAL
BACTERIA UR CULT: ABNORMAL
SIGNIFICANT IND 70042: ABNORMAL
SITE SITE: ABNORMAL
SOURCE SOURCE: ABNORMAL

## 2020-05-08 ENCOUNTER — OFFICE VISIT (OUTPATIENT)
Dept: INTERNAL MEDICINE | Facility: IMAGING CENTER | Age: 75
End: 2020-05-08
Payer: MEDICARE

## 2020-05-08 ENCOUNTER — HOSPITAL ENCOUNTER (OUTPATIENT)
Facility: MEDICAL CENTER | Age: 75
End: 2020-05-08
Attending: INTERNAL MEDICINE
Payer: MEDICARE

## 2020-05-08 VITALS
HEART RATE: 74 BPM | OXYGEN SATURATION: 95 % | TEMPERATURE: 97.9 F | DIASTOLIC BLOOD PRESSURE: 78 MMHG | WEIGHT: 121 LBS | HEIGHT: 61 IN | SYSTOLIC BLOOD PRESSURE: 122 MMHG | BODY MASS INDEX: 22.84 KG/M2 | RESPIRATION RATE: 16 BRPM

## 2020-05-08 DIAGNOSIS — M81.0 OSTEOPOROSIS OF LOWER LEG WITHOUT PATHOLOGICAL FRACTURE: ICD-10-CM

## 2020-05-08 DIAGNOSIS — Z11.59 ENCOUNTER FOR HEPATITIS C SCREENING TEST FOR LOW RISK PATIENT: ICD-10-CM

## 2020-05-08 DIAGNOSIS — E03.9 HYPOTHYROIDISM, UNSPECIFIED TYPE: ICD-10-CM

## 2020-05-08 DIAGNOSIS — R35.0 URINARY FREQUENCY: ICD-10-CM

## 2020-05-08 DIAGNOSIS — R25.1 TREMOR: ICD-10-CM

## 2020-05-08 DIAGNOSIS — Z91.09 ENVIRONMENTAL ALLERGIES: ICD-10-CM

## 2020-05-08 DIAGNOSIS — F41.9 ANXIETY: ICD-10-CM

## 2020-05-08 DIAGNOSIS — E78.00 PURE HYPERCHOLESTEROLEMIA: ICD-10-CM

## 2020-05-08 DIAGNOSIS — R53.83 OTHER FATIGUE: ICD-10-CM

## 2020-05-08 LAB
APPEARANCE UR: CLEAR
BILIRUB UR QL STRIP.AUTO: NEGATIVE
COLOR UR: YELLOW
GLUCOSE UR STRIP.AUTO-MCNC: NEGATIVE MG/DL
HCV AB SER QL: NORMAL
KETONES UR STRIP.AUTO-MCNC: NEGATIVE MG/DL
LEUKOCYTE ESTERASE UR QL STRIP.AUTO: NEGATIVE
MICRO URNS: NORMAL
NITRITE UR QL STRIP.AUTO: NEGATIVE
PH UR STRIP.AUTO: 7 [PH] (ref 5–8)
PROT UR QL STRIP: NEGATIVE MG/DL
RBC UR QL AUTO: NEGATIVE
SP GR UR STRIP.AUTO: 1.01
THYROPEROXIDASE AB SERPL-ACNC: <9 IU/ML (ref 0–9)
UROBILINOGEN UR STRIP.AUTO-MCNC: 0.2 MG/DL

## 2020-05-08 PROCEDURE — 86803 HEPATITIS C AB TEST: CPT

## 2020-05-08 PROCEDURE — 99215 OFFICE O/P EST HI 40 MIN: CPT | Performed by: INTERNAL MEDICINE

## 2020-05-08 PROCEDURE — 81003 URINALYSIS AUTO W/O SCOPE: CPT

## 2020-05-08 PROCEDURE — 86376 MICROSOMAL ANTIBODY EACH: CPT

## 2020-05-08 RX ORDER — LEVOTHYROXINE SODIUM 50 MCG
50 TABLET ORAL DAILY
Qty: 90 TAB | Refills: 3 | Status: SHIPPED | OUTPATIENT
Start: 2020-05-08 | End: 2021-05-11

## 2020-05-08 RX ORDER — PROPRANOLOL HYDROCHLORIDE 10 MG/1
10 TABLET ORAL 3 TIMES DAILY PRN
Qty: 30 TAB | Refills: 3 | Status: SHIPPED | OUTPATIENT
Start: 2020-05-08 | End: 2020-10-27

## 2020-05-08 ASSESSMENT — ACTIVITIES OF DAILY LIVING (ADL): BATHING_REQUIRES_ASSISTANCE: 0

## 2020-05-08 ASSESSMENT — FIBROSIS 4 INDEX: FIB4 SCORE: 3.45

## 2020-05-08 ASSESSMENT — ENCOUNTER SYMPTOMS: GENERAL WELL-BEING: EXCELLENT

## 2020-05-08 ASSESSMENT — PATIENT HEALTH QUESTIONNAIRE - PHQ9: CLINICAL INTERPRETATION OF PHQ2 SCORE: 0

## 2020-05-08 NOTE — PROGRESS NOTES
Chief Complaint   Patient presents with   • Establish Care   • Hypothyroidism       HISTORY OF THE PRESENT ILLNESS: Patient is a 75 y.o. female.     New patient. Here to establish care. She was most recently followed by Dr. Greene. She is active. She gets regular exercise. She does not smoke. She drinks in moderation. She is . Her  is a retired orthopedic surgeon.    Colonoscopy-2013 with tenure repeat.  Mammogram-yearly. Last was January 2019.  Bone density-March 2019, osteopenia anemia of the spine and osteoporosis of the hip, -3.1.  Immunizations-up to date including influenza, pneumonia and shingles.    Chronic issues:    Hypothyroid-she’s been supplementation for years. Her recent thyroid function test were normal. She complains of fatigue. Symptoms have been ongoing for 10 years. She will often take an afternoon nap. No snoring or other signs of sleep apnea. No anemia or other lab abnormalities.    Anxiety-symptoms for many years. She has some daily symptoms but primarily situational. She is on no treatment. Today, we discussed treatment as needed including Xanax and other     Fatigue-this is been an ongoing issue for more than 10 years. As discussed above    Environmental allergies-stable with Claritin and Flonase.    Tremor-over the last 6-8 months she is noticed a tremor that has developed in her right arm. No numbness or tingling. No carpal tunnel symptoms. She has a history of chronic neck issues but is never been diagnosed with DJD, spinal stenosis or other. She is had no specific imaging of the neck. No EMG. She did have a normal MRI of the brain in 2015.    Osteoporosis-bone density and March 2019 should osteoporosis of the hip with a score of -1.3. She’s currently trying to treat with weight-bearing exercise and vitamin D. Her vitamin D levels appropriate at 47. She has been on Fosamax in the past. She is trying to avoid any injectable  "medication.    Thrombocytopenia-chronic/stable.    Hyperlipidemia-LDL cholesterol is 114. Her LDL is typically greater than 100. She is on no treatment. Her cardiac calcium test in August 2019 had a score of zero.      Allergies: Augmentin [amoxicillin-pot clavulanate]; Iodine; Mercury; and Other environmental    Current Outpatient Medications Ordered in Epic   Medication Sig Dispense Refill   • propranolol (INDERAL) 10 MG Tab Take 1 Tab by mouth 3 times a day as needed (anxiety). 30 Tab 3   • SYNTHROID 50 MCG Tab Take 1 Tab by mouth every day. 90 Tab 3   • calcipotriene (DOVONEX) 0.005 % Cream Apply  to affected area(s) 2 times a day.     • fluorouracil (EFUDEX) 5 % cream Apply  to affected area(s) 2 times a day.     • Fluticasone Propionate (FLONASE NA) Spray  in nose every day.       No current [facility]-ordered facility-administered medications on file.        Past medical history, social history and family history were reviewed from chart today    Review of systems: Per HPI.    Denies headache, chest pain, fever, chills, diarrhea, constipation, abdominal pain, palpitations, depression   All others negative.     Exam: /78 (BP Location: Left arm, Patient Position: Sitting, BP Cuff Size: Adult)   Pulse 74   Temp 36.6 °C (97.9 °F) (Temporal)   Resp 16   Ht 1.549 m (5' 1\")   Wt 54.9 kg (121 lb)   SpO2 95%   General: Well-appearing. Well-developed. No signs of distress.  Normal facial expressions.  HEENT: Grossly normal. Oral cavity is pink and moist.  Neck: Supple without JVD or bruit.  Pulmonary: Clear with good breath sounds. Normal effort.  Cardiovascular: Regular. Carotid and radial pulses are intact.  Abdomen: Soft, nontender, nondistended. Spleen and liver are not enlarged.  Neurologic: Cranial nerves II through XII are grossly normal, alert and oriented x3.  High-frequency tremor in the right extremity.  This is both at rest and with action.  Gait is normal.      Diagnosis:  1. Hypothyroidism, " unspecified type  THYROID PEROXIDASE  (TPO) AB    SYNTHROID 50 MCG Tab   2. Osteoporosis of lower leg without pathological fracture  CTX COLLAGEN TYPE 1   3. Pure hypercholesterolemia     4. Environmental allergies     5. Tremor  REFERRAL TO NEUROLOGY   6. Anxiety     7. Urinary frequency  URINALYSIS,CULTURE IF INDICATED   8. Encounter for hepatitis C screening test for low risk patient  HEP C VIRUS ANTIBODY   9. Other fatigue           Assessment:  good health.  Patient with history of anxiety. I suspect this is more regular than situational.  Patient with tremor in right arm.  Possible carpal tunnel versus cervical etiology.  Due to the high frequency I doubt Parkinson's but she did have tremor at rest.  No other stigmata of Parkinson's.  Patient with hypothyroid. I suspect she is clinically euthyroid based on her labs however she has chronic fatigue. Consider armor thyroid or other?  Osteoporosis is currently treated with vitamin D and weight-bearing exercise only. I discussed with her that I think this is unlikely to resolve her bone loss.  Due for mammogram.    Plan:  Trial of propranolol for anxiety. She does not respond consider Xanax. If symptoms are more regular than consider BuSpar or SSRI therapy. This was all discussed in detail today including side effects.    We’ll see how her fatigue response to treatment for anxiety. She thinks it may be related.    Refer to neurology for tremor.    Continue current treatment for allergies.    Check CTX laboratory for bone turnover. I like to do this when she is back to her regular level of activity. Continue vitamin D.    She will contact [facility] to have mammogram completed.    65 minutes was spent with the patient in today's consultation. More than 40 minutes was spent in counseling and coordinating care

## 2020-08-11 ENCOUNTER — OFFICE VISIT (OUTPATIENT)
Dept: INTERNAL MEDICINE | Facility: IMAGING CENTER | Age: 75
End: 2020-08-11
Payer: MEDICARE

## 2020-08-11 VITALS
SYSTOLIC BLOOD PRESSURE: 120 MMHG | BODY MASS INDEX: 22.09 KG/M2 | HEART RATE: 68 BPM | TEMPERATURE: 99.2 F | HEIGHT: 61 IN | DIASTOLIC BLOOD PRESSURE: 70 MMHG | RESPIRATION RATE: 14 BRPM | OXYGEN SATURATION: 97 % | WEIGHT: 117 LBS

## 2020-08-11 DIAGNOSIS — R25.1 TREMOR: ICD-10-CM

## 2020-08-11 DIAGNOSIS — E78.00 PURE HYPERCHOLESTEROLEMIA: ICD-10-CM

## 2020-08-11 DIAGNOSIS — Z12.31 SCREENING MAMMOGRAM, ENCOUNTER FOR: ICD-10-CM

## 2020-08-11 DIAGNOSIS — Z00.00 MEDICARE ANNUAL WELLNESS VISIT, SUBSEQUENT: ICD-10-CM

## 2020-08-11 DIAGNOSIS — M81.0 OSTEOPOROSIS OF LOWER LEG WITHOUT PATHOLOGICAL FRACTURE: ICD-10-CM

## 2020-08-11 DIAGNOSIS — K21.9 GASTROESOPHAGEAL REFLUX DISEASE WITHOUT ESOPHAGITIS: ICD-10-CM

## 2020-08-11 DIAGNOSIS — F41.9 ANXIETY: ICD-10-CM

## 2020-08-11 DIAGNOSIS — E03.9 HYPOTHYROIDISM, UNSPECIFIED TYPE: ICD-10-CM

## 2020-08-11 DIAGNOSIS — D69.6 THROMBOCYTOPENIA (HCC): ICD-10-CM

## 2020-08-11 PROCEDURE — G0439 PPPS, SUBSEQ VISIT: HCPCS | Performed by: INTERNAL MEDICINE

## 2020-08-11 RX ORDER — SERTRALINE HYDROCHLORIDE 25 MG/1
25 TABLET, FILM COATED ORAL DAILY
Qty: 30 TAB | Refills: 11 | Status: SHIPPED | OUTPATIENT
Start: 2020-08-11 | End: 2021-08-13 | Stop reason: SDUPTHER

## 2020-08-11 RX ORDER — ACETAMINOPHEN 160 MG
2000 TABLET,DISINTEGRATING ORAL DAILY
Status: ON HOLD | COMMUNITY
End: 2023-05-02

## 2020-08-11 ASSESSMENT — ENCOUNTER SYMPTOMS: GENERAL WELL-BEING: EXCELLENT

## 2020-08-11 ASSESSMENT — PATIENT HEALTH QUESTIONNAIRE - PHQ9: CLINICAL INTERPRETATION OF PHQ2 SCORE: 0

## 2020-08-11 ASSESSMENT — FIBROSIS 4 INDEX: FIB4 SCORE: 3.45

## 2020-08-11 ASSESSMENT — ACTIVITIES OF DAILY LIVING (ADL): BATHING_REQUIRES_ASSISTANCE: 0

## 2020-08-11 NOTE — PROGRESS NOTES
75 y.o. female presents for the following:    Patient comes in for annual physical, health risk assessment and review of laboratory.  She is active.  She considers herself to be in good health.  No depression.  No balance issues.  No cognitive issues.    Hypothyroid-clinically euthyroid.  Thyroid function tests are normal.    GERD-stable with Tums as needed.  Symptoms are infrequent.    Osteoporosis-bone density in March 2019 showed a T score of -1.9 of the spine and -3.1 of the hip.  She previously been on Fosamax for more than 10 years with Dr. Flores.  She has been off treatment for at least 5 years maybe as long as 10 years.  She calcium and vitamin D intake is limited to dietary intake from dairy.    Tremor-patient has ongoing tremor.  Symptoms are entirely on the right.  She has a resting tremor.  She was seen by neurology who felt surveillance was most appropriate.  She had an MRI that was essentially unremarkable.    Anxiety-patient has ongoing issues with anxiety.  She has propranolol that she takes as needed.  Her symptoms are affecting her quality of life.  No panic attacks.    Thrombocytopenia-platelet count remained stable over the years.  White blood cell and hemoglobin/hematocrit are normal.    Annual Wellness Visit/Health Risk Assessment:    Past medical:  Past Medical History:   Diagnosis Date   • Acquired hypothyroidism 1/24/2019   • Actinic keratoses 1/24/2019   • Allergic rhinitis    • Anxiety    • CATARACT    • Gastroesophageal reflux disease with esophagitis 1/24/2019   • Hemochromatosis     carrier   • History of squamous cell carcinoma 1/24/2019   • Hyperlipidemia    • Osteoporosis    • Other specified symptom associated with female genital organs     prolapse   • Pure hypercholesterolemia 5/23/2019   • Urinary bladder disorder     prolapse   • Vaginal prolapse 1/24/2019   • Vitamin D deficiency        Past surgical:  Past Surgical History:   Procedure Laterality Date   • SHOULDER ARTHROSCOPY  W/ ROTATOR CUFF REPAIR  7/11/2012    Performed by SVETLANA MCLEOD at SURGERY [facility] ORS   • BICEPS TENDON REPAIR  7/11/2012    Performed by SVETLANA MCLEOD at SURGERY [facility] ORS   • SHOULDER DECOMPRESSION  7/11/2012    Performed by SVETLANA MCLEOD at SURGERY [facility] ORS   • VAGINAL HYSTERECTOMY SCOPE TOTAL  8/24/2011    Performed by QAMAR COPELAND at SURGERY SAME DAY [facility] ORS   • ANTERIOR AND POSTERIOR REPAIR  8/24/2011    Performed by QAMAR COPELAND at SURGERY SAME DAY [facility] ORS   • TUBAL LIGATION  1980   • OTHER      wisdom  teeth       Family history: relating to possible risk factors for your patient  Family History   Problem Relation Age of Onset   • Diabetes Other    • Lung Disease Mother        Current Providers (including home care/DME’s):   No Patient Care Coordination Note on file.      Patient Care Team:  Eldon Ross M.D. as PCP - General (Internal Medicine)  Yuliet Lucio R.N. as Registered Nurse      Medications:   Current Outpatient Medications Ordered in Epic   Medication Sig Dispense Refill   • Cholecalciferol (VITAMIN D3) 2000 UNIT Cap Take 2,000 Units by mouth every day.     • sertraline (ZOLOFT) 25 MG tablet Take 1 Tab by mouth every day. 30 Tab 11   • propranolol (INDERAL) 10 MG Tab Take 1 Tab by mouth 3 times a day as needed (anxiety). 30 Tab 3   • SYNTHROID 50 MCG Tab Take 1 Tab by mouth every day. 90 Tab 3   • Fluticasone Propionate (FLONASE NA) Spray  in nose every day.     • calcipotriene (DOVONEX) 0.005 % Cream Apply  to affected area(s) 2 times a day.     • fluorouracil (EFUDEX) 5 % cream Apply  to affected area(s) 2 times a day.       No current [facility]-ordered facility-administered medications on file.        Supplements (calcium/vitamins): if not lisited in medications    Chief Complaint   Patient presents with   • Annual Exam         HPI:  Kimmy Diaz is a 75 y.o. here for Medicare Annual Wellness Visit     Patient Active Problem List    Diagnosis Date  Noted   • Pure hypercholesterolemia 05/23/2019   • Actinic keratoses 01/24/2019   • History of squamous cell carcinoma 01/24/2019   • Acquired hypothyroidism 01/24/2019   • Vaginal prolapse 01/24/2019   • Gastroesophageal reflux disease with esophagitis 01/24/2019   • Osteoporosis of lower leg without pathological fracture 07/10/2011       Current Outpatient Medications   Medication Sig Dispense Refill   • Cholecalciferol (VITAMIN D3) 2000 UNIT Cap Take 2,000 Units by mouth every day.     • sertraline (ZOLOFT) 25 MG tablet Take 1 Tab by mouth every day. 30 Tab 11   • propranolol (INDERAL) 10 MG Tab Take 1 Tab by mouth 3 times a day as needed (anxiety). 30 Tab 3   • SYNTHROID 50 MCG Tab Take 1 Tab by mouth every day. 90 Tab 3   • Fluticasone Propionate (FLONASE NA) Spray  in nose every day.     • calcipotriene (DOVONEX) 0.005 % Cream Apply  to affected area(s) 2 times a day.     • fluorouracil (EFUDEX) 5 % cream Apply  to affected area(s) 2 times a day.       No current facility-administered medications for this visit.             Current supplements as per medication list.       Allergies: Augmentin [amoxicillin-pot clavulanate], Iodine, Mercury, and Other environmental    Current social contact/activities:  Social with friends and family..    She  reports that she has never smoked. She has never used smokeless tobacco. She reports current alcohol use of about 1.2 oz of alcohol per week. She reports that she does not use drugs.  Counseling given: Not Answered        DPA/Advanced Directive: Has not completed      ROS:    Gait: Uses : None  Ostomy: No  Other tubes: no   Amputations: no   Chronic oxygen use: no   Last eye exam: Within the last year.  : Denies any urinary leakage during the last 6 months incontinence.       Screening:  No Patient Care Coordination Note on file.      Depression Screening    Little interest or pleasure in doing things?  0 - not at all  Feeling down, depressed , or hopeless? 0 - not at  all  Patient Health Questionnaire Score: 0     If depressive symptoms identified deferred to follow up visit unless specifically addressed in assessment and plan.    Interpretation of PHQ-9 Total Score   Score Severity   1-4 No Depression   5-9 Mild Depression   10-14 Moderate Depression   15-19 Moderately Severe Depression   20-27 Severe Depression    Screening for Cognitive Impairment    Three Minute Recall (river, nation, finger) 3/3    Ramesh clock face with all 12 numbers and set the hands to show 10 past 11.  No    Cognitive concerns identified deferred for follow up unless specifically addressed in assessment and plan.    Fall Risk Assessment    Has the patient had two or more falls in the last year or any fall with injury in the last year?  No    Safety Assessment    Throw rugs on floor.  No  Handrails on all stairs.  Yes  Good lighting in all hallways.  Yes  Difficulty hearing.  No  Patient counseled about all safety risks that were identified.    Functional Assessment ADLs    Are there any barriers preventing you from cooking for yourself or meeting nutritional needs?  No.    Are there any barriers preventing you from driving safely or obtaining transportation?  No.    Are there any barriers preventing you from using a telephone or calling for help?  No.    Are there any barriers preventing you from shopping?  No.    Are there any barriers preventing you from taking care of your own finances?  No.    Are there any barriers preventing you from managing your medications?  No.    Are there any barriers preventing you from showering, bathing or dressing yourself?  No.    Are you currently engaging in any exercise or physical activity?  Yes.     What is your perception of your health?  Excellent.      Health Maintenance Summary                MAMMOGRAM Overdue 1/8/2020      Done 1/8/2019 [id number]     Patient has more history with this topic...    Annual Wellness Visit Overdue 1/25/2020      Done  1/24/2019 INITIAL ANNUAL WELLNESS VISIT-INCLUDES PPPS ()     Patient has more history with this topic...    IMM INFLUENZA Next Due 9/1/2020      Done 10/2/2019 Imm Admin: Influenza Vaccine Adult HD     Patient has more history with this topic...    COLONOSCOPY Next Due 6/6/2023      Done 6/6/2013 REFERRAL TO GI FOR COLONOSCOPY     Patient has more history with this topic...    BONE DENSITY Next Due 3/18/2024      Done 3/18/2019 DS-BONE DENSITY STUDY (DEXA)     Patient has more history with this topic...    IMM DTaP/Tdap/Td Vaccine Next Due 8/25/2026      Done 8/25/2016 Imm Admin: Tdap Vaccine          Patient Care Team:  Eldon Ross M.D. as PCP - General (Internal Medicine)  Yuliet Lucio R.N. as Registered Nurse        Social History     Tobacco Use   • Smoking status: Never Smoker   • Smokeless tobacco: Never Used   Substance Use Topics   • Alcohol use: Yes     Alcohol/week: 1.2 oz     Types: 2 Glasses of wine per week     Comment: less than 5 glasses per week   • Drug use: No     Family History   Problem Relation Age of Onset   • Diabetes Other    • Lung Disease Mother      She  has a past medical history of Acquired hypothyroidism (1/24/2019), Actinic keratoses (1/24/2019), Allergic rhinitis, Anxiety, CATARACT, Gastroesophageal reflux disease with esophagitis (1/24/2019), Hemochromatosis, History of squamous cell carcinoma (1/24/2019), Hyperlipidemia, Osteoporosis, Other specified symptom associated with female genital organs, Pure hypercholesterolemia (5/23/2019), Urinary bladder disorder, Vaginal prolapse (1/24/2019), and Vitamin D deficiency. She also has no past medical history of Breast cancer (HCC), Depression, or Diabetes (HCC).   Past Surgical History:   Procedure Laterality Date   • SHOULDER ARTHROSCOPY W/ ROTATOR CUFF REPAIR  7/11/2012    Performed by SVETLANA MCLEOD at SURGERY [facility] ORS   • BICEPS TENDON REPAIR  7/11/2012    Performed by SVETLANA MCLEOD at SURGERY [facility] ORS   •  "SHOULDER DECOMPRESSION  7/11/2012    Performed by SVETLANA MCLEOD at SURGERY [facility] ORS   • VAGINAL HYSTERECTOMY SCOPE TOTAL  8/24/2011    Performed by QAMAR COPELAND at SURGERY SAME DAY [facility] ORS   • ANTERIOR AND POSTERIOR REPAIR  8/24/2011    Performed by QAMAR COPELAND at SURGERY SAME DAY [facility] ORS   • TUBAL LIGATION  1980   • OTHER      wisdom  teeth       Exam:     /70 (BP Location: Left arm, Patient Position: Sitting, BP Cuff Size: Adult)   Pulse 68   Temp 37.3 °C (99.2 °F) (Temporal)   Resp 14   Ht 1.549 m (5' 1\")   Wt 53.1 kg (117 lb)   SpO2 97%  Body mass index is 22.11 kg/m².    Hearing good.    Dentition good  Alert, oriented in no acute distress.  Eye contact is good, speech goal directed, affect calm  General: Well-appearing and in no distress.  HEENT: Grossly normal. Oral cavity is pink and moist.  Neck: Supple without JVD or bruit.  Pulmonary: Clear with good breath sounds. Normal effort.  Cardiovascular: Regular. Carotid and radial pulses are intact.  Abdomen: Soft, nontender, nondistended.  Neurologic: Resting tremor in the right hand.  Grossly nonfocal.  Patellar reflexes are intact.  Strength and sensation is grossly normal.  Breast: Normal appearance.  Nipple and areole are normal.  No suspicious finding with palpation.      Assessment and Plan. The following treatment and monitoring plan is recommended:    1. Medicare annual wellness visit, subsequent     2. Hypothyroidism, unspecified type     3. Osteoporosis of lower leg without pathological fracture     4. Pure hypercholesterolemia     5. Tremor     6. Anxiety     7. Thrombocytopenia (HCC)     8. Gastroesophageal reflux disease without esophagitis     9. Screening mammogram, encounter for         75-year-old female in good health.  No change in current medications.  Start Zoloft 25 mg for anxiety.  We will reassess in the following 1 month.  Recommended she resume treatment for osteoporosis.  Discussed Prolia, " Reclast and resuming Fosamax.  I recommend resuming Fosamax as she tolerated it previously however Reclast would be another option.  Patient has right-sided tremor.  The frequency of the tremor seems fast for Parkinson's but it is unilateral and she does have decreased facial expressions which could be related.  We discussed a possible trial of carbidopa/levodopa but she would like to monitor.  No further work-up or evaluation of thrombocytopenia.  I believe this is chronic and benign  Due for mammogram    Services suggested: No services required at this time  Health Care Screening: Age-appropriate preventive services Medicare covers discussed today and ordered if indicated.  Referrals offered: Community-based lifestyle interventions to reduce health risks and promote self-management and wellness, fall prevention, nutrition, physical activity, tobacco-use cessation, weight loss, and mental health services as per orders if indicated.    Discussion today about general wellness and lifestyle habits:    · Prevent falls and reduce trip hazards; Cautioned about securing or removing rugs.  · Have a working fire alarm and carbon monoxide detector;   · Engage in regular physical activity and social activities       Follow-up: 6 months

## 2020-09-11 ENCOUNTER — TELEPHONE (OUTPATIENT)
Dept: INTERNAL MEDICINE | Facility: IMAGING CENTER | Age: 75
End: 2020-09-11

## 2020-09-11 NOTE — TELEPHONE ENCOUNTER
I called the patient to follow-up on Zoloft that she had started for anxiety.  She took 1 tablet but discontinued because she thought it made her feel more anxious.  She reports that she read a article about stress reduction and has been trying to control it with lifestyle.  She thinks she is doing better.    We also discussed her right shoulder.  She has ongoing pain.  She has follow-up with Dr. Calabrese.    Lastly, she reports that Dr. Cali has put her on a 6-month follow-up to reassess for possible early Parkinson's disease

## 2020-09-24 ENCOUNTER — NON-PROVIDER VISIT (OUTPATIENT)
Dept: INTERNAL MEDICINE | Facility: IMAGING CENTER | Age: 75
End: 2020-09-24
Payer: MEDICARE

## 2020-09-24 DIAGNOSIS — Z23 NEED FOR INFLUENZA VACCINATION: ICD-10-CM

## 2020-09-24 PROCEDURE — 90662 IIV NO PRSV INCREASED AG IM: CPT | Performed by: INTERNAL MEDICINE

## 2020-09-24 PROCEDURE — G0008 ADMIN INFLUENZA VIRUS VAC: HCPCS | Performed by: INTERNAL MEDICINE

## 2020-10-27 ENCOUNTER — PRE-ADMISSION TESTING (OUTPATIENT)
Dept: ADMISSIONS | Facility: MEDICAL CENTER | Age: 75
End: 2020-10-27
Attending: ORTHOPAEDIC SURGERY
Payer: MEDICARE

## 2020-10-27 DIAGNOSIS — Z01.810 PRE-OPERATIVE CARDIOVASCULAR EXAMINATION: ICD-10-CM

## 2020-10-27 PROCEDURE — 93005 ELECTROCARDIOGRAM TRACING: CPT

## 2020-10-27 RX ORDER — LORATADINE 10 MG/1
CAPSULE, LIQUID FILLED ORAL DAILY
COMMUNITY
End: 2022-11-14

## 2020-10-27 RX ORDER — FLUORIDE TOOTHPASTE
TOOTHPASTE DENTAL 4 TIMES DAILY
Status: ON HOLD | COMMUNITY
End: 2023-05-02

## 2020-10-27 RX ORDER — ACETAMINOPHEN 325 MG/1
650 TABLET ORAL EVERY 4 HOURS PRN
COMMUNITY
End: 2022-11-14

## 2020-10-27 ASSESSMENT — FIBROSIS 4 INDEX: FIB4 SCORE: 3.45

## 2020-10-27 NOTE — OR NURSING
"Preadmit appointment: \" Preparing for your Procedure information\" sheet given to patient with verbal and written instructions. Patient instructed to continue prescribed medications through the day before surgery, instructed to take the following medications the day of surgery per anesthesia protocol: Tylenol as needed, sertraline, Synthroid.     Pt denies issues with anesthesia.      Covid test scheduled Oct. 31,2020; pt given verbal and written instructions for self isolation and to report S/S of Covid to Dr. Calabrese' office.    Pt reports she is a hemochromotosis gene carrier, but denies having the disease.      "

## 2020-10-28 LAB — EKG IMPRESSION: NORMAL

## 2020-10-28 PROCEDURE — 93010 ELECTROCARDIOGRAM REPORT: CPT | Performed by: INTERNAL MEDICINE

## 2020-10-28 NOTE — OR NURSING
Emailed Dr. Kwong:    No reported heart hx.  Can you please review abn ekg and I attached previous ekg for comparison, Thanks, Anca

## 2020-10-28 NOTE — OR NURSING
The two ECGs are essentially similar except that the poor R wave progression is slightly worse currently. Not much else can be said solely based on these two ECG ‘s. OK to proceed from that standpoint. Thank you.  Micheal Kwong M.D.  [facility]

## 2020-10-31 ENCOUNTER — PRE-ADMISSION TESTING (OUTPATIENT)
Dept: ADMISSIONS | Facility: MEDICAL CENTER | Age: 75
End: 2020-10-31
Attending: ORTHOPAEDIC SURGERY
Payer: MEDICARE

## 2020-10-31 DIAGNOSIS — Z01.812 PRE-OPERATIVE LABORATORY EXAMINATION: ICD-10-CM

## 2020-10-31 LAB
COVID ORDER STATUS COVID19: NORMAL
SARS-COV-2 RNA RESP QL NAA+PROBE: NOTDETECTED
SPECIMEN SOURCE: NORMAL

## 2020-10-31 PROCEDURE — U0003 INFECTIOUS AGENT DETECTION BY NUCLEIC ACID (DNA OR RNA); SEVERE ACUTE RESPIRATORY SYNDROME CORONAVIRUS 2 (SARS-COV-2) (CORONAVIRUS DISEASE [COVID-19]), AMPLIFIED PROBE TECHNIQUE, MAKING USE OF HIGH THROUGHPUT TECHNOLOGIES AS DESCRIBED BY CMS-2020-01-R: HCPCS

## 2020-11-04 ENCOUNTER — ANESTHESIA (OUTPATIENT)
Dept: SURGERY | Facility: MEDICAL CENTER | Age: 75
End: 2020-11-04
Payer: MEDICARE

## 2020-11-04 ENCOUNTER — HOSPITAL ENCOUNTER (OUTPATIENT)
Facility: MEDICAL CENTER | Age: 75
End: 2020-11-04
Attending: ORTHOPAEDIC SURGERY | Admitting: ORTHOPAEDIC SURGERY
Payer: MEDICARE

## 2020-11-04 ENCOUNTER — ANESTHESIA EVENT (OUTPATIENT)
Dept: SURGERY | Facility: MEDICAL CENTER | Age: 75
End: 2020-11-04
Payer: MEDICARE

## 2020-11-04 VITALS
DIASTOLIC BLOOD PRESSURE: 78 MMHG | OXYGEN SATURATION: 97 % | WEIGHT: 112.88 LBS | HEIGHT: 61 IN | RESPIRATION RATE: 16 BRPM | BODY MASS INDEX: 21.31 KG/M2 | SYSTOLIC BLOOD PRESSURE: 140 MMHG | HEART RATE: 72 BPM | TEMPERATURE: 97.3 F

## 2020-11-04 PROCEDURE — 160009 HCHG ANES TIME/MIN: Performed by: ORTHOPAEDIC SURGERY

## 2020-11-04 PROCEDURE — 500028 HCHG ARTHROWAND TURBOVAC 3.5/90 SUCT.: Performed by: ORTHOPAEDIC SURGERY

## 2020-11-04 PROCEDURE — 160029 HCHG SURGERY MINUTES - 1ST 30 MINS LEVEL 4: Performed by: ORTHOPAEDIC SURGERY

## 2020-11-04 PROCEDURE — 160035 HCHG PACU - 1ST 60 MINS PHASE I: Performed by: ORTHOPAEDIC SURGERY

## 2020-11-04 PROCEDURE — 160048 HCHG OR STATISTICAL LEVEL 1-5: Performed by: ORTHOPAEDIC SURGERY

## 2020-11-04 PROCEDURE — 160025 RECOVERY II MINUTES (STATS): Performed by: ORTHOPAEDIC SURGERY

## 2020-11-04 PROCEDURE — 700111 HCHG RX REV CODE 636 W/ 250 OVERRIDE (IP): Performed by: ORTHOPAEDIC SURGERY

## 2020-11-04 PROCEDURE — 502581 HCHG PACK, SHOULDER ARTHROSCOPY: Performed by: ORTHOPAEDIC SURGERY

## 2020-11-04 PROCEDURE — C1713 ANCHOR/SCREW BN/BN,TIS/BN: HCPCS | Performed by: ORTHOPAEDIC SURGERY

## 2020-11-04 PROCEDURE — 64415 NJX AA&/STRD BRCH PLXS IMG: CPT | Performed by: ORTHOPAEDIC SURGERY

## 2020-11-04 PROCEDURE — 700101 HCHG RX REV CODE 250: Performed by: ANESTHESIOLOGY

## 2020-11-04 PROCEDURE — 502000 HCHG MISC OR IMPLANTS RC 0278: Performed by: ORTHOPAEDIC SURGERY

## 2020-11-04 PROCEDURE — 160046 HCHG PACU - 1ST 60 MINS PHASE II: Performed by: ORTHOPAEDIC SURGERY

## 2020-11-04 PROCEDURE — 501838 HCHG SUTURE GENERAL: Performed by: ORTHOPAEDIC SURGERY

## 2020-11-04 PROCEDURE — 160002 HCHG RECOVERY MINUTES (STAT): Performed by: ORTHOPAEDIC SURGERY

## 2020-11-04 PROCEDURE — 700105 HCHG RX REV CODE 258: Performed by: ORTHOPAEDIC SURGERY

## 2020-11-04 PROCEDURE — 160041 HCHG SURGERY MINUTES - EA ADDL 1 MIN LEVEL 4: Performed by: ORTHOPAEDIC SURGERY

## 2020-11-04 PROCEDURE — 700111 HCHG RX REV CODE 636 W/ 250 OVERRIDE (IP): Performed by: ANESTHESIOLOGY

## 2020-11-04 PROCEDURE — 502240 HCHG MISC OR SUPPLY RC 0272: Performed by: ORTHOPAEDIC SURGERY

## 2020-11-04 DEVICE — SUTURE 1.2MM/2MM ANCHOR TAPE ICONIX SPEED  (5EA/BX): Type: IMPLANTABLE DEVICE | Site: SHOULDER | Status: FUNCTIONAL

## 2020-11-04 DEVICE — SUTURE 5.5 MM ANCHOR KNOTLESS APOLLO (5EA/BX): Type: IMPLANTABLE DEVICE | Site: SHOULDER | Status: FUNCTIONAL

## 2020-11-04 DEVICE — IMPLANTABLE DEVICE: Type: IMPLANTABLE DEVICE | Site: SHOULDER | Status: FUNCTIONAL

## 2020-11-04 RX ORDER — OXYCODONE HCL 5 MG/5 ML
10 SOLUTION, ORAL ORAL
Status: DISCONTINUED | OUTPATIENT
Start: 2020-11-04 | End: 2020-11-04 | Stop reason: HOSPADM

## 2020-11-04 RX ORDER — SODIUM CHLORIDE, SODIUM LACTATE, POTASSIUM CHLORIDE, CALCIUM CHLORIDE 600; 310; 30; 20 MG/100ML; MG/100ML; MG/100ML; MG/100ML
INJECTION, SOLUTION INTRAVENOUS CONTINUOUS
Status: DISCONTINUED | OUTPATIENT
Start: 2020-11-04 | End: 2020-11-04 | Stop reason: HOSPADM

## 2020-11-04 RX ORDER — BUPIVACAINE HYDROCHLORIDE 2.5 MG/ML
INJECTION, SOLUTION EPIDURAL; INFILTRATION; INTRACAUDAL PRN
Status: DISCONTINUED | OUTPATIENT
Start: 2020-11-04 | End: 2020-11-04 | Stop reason: SURG

## 2020-11-04 RX ORDER — CEFAZOLIN SODIUM 1 G/3ML
INJECTION, POWDER, FOR SOLUTION INTRAMUSCULAR; INTRAVENOUS PRN
Status: DISCONTINUED | OUTPATIENT
Start: 2020-11-04 | End: 2020-11-04 | Stop reason: SURG

## 2020-11-04 RX ORDER — HYDROMORPHONE HYDROCHLORIDE 1 MG/ML
0.2 INJECTION, SOLUTION INTRAMUSCULAR; INTRAVENOUS; SUBCUTANEOUS
Status: DISCONTINUED | OUTPATIENT
Start: 2020-11-04 | End: 2020-11-04 | Stop reason: HOSPADM

## 2020-11-04 RX ORDER — OXYCODONE HCL 5 MG/5 ML
5 SOLUTION, ORAL ORAL
Status: DISCONTINUED | OUTPATIENT
Start: 2020-11-04 | End: 2020-11-04 | Stop reason: HOSPADM

## 2020-11-04 RX ORDER — DEXAMETHASONE SODIUM PHOSPHATE 4 MG/ML
INJECTION, SOLUTION INTRA-ARTICULAR; INTRALESIONAL; INTRAMUSCULAR; INTRAVENOUS; SOFT TISSUE PRN
Status: DISCONTINUED | OUTPATIENT
Start: 2020-11-04 | End: 2020-11-04 | Stop reason: SURG

## 2020-11-04 RX ORDER — HYDROMORPHONE HYDROCHLORIDE 1 MG/ML
0.4 INJECTION, SOLUTION INTRAMUSCULAR; INTRAVENOUS; SUBCUTANEOUS
Status: DISCONTINUED | OUTPATIENT
Start: 2020-11-04 | End: 2020-11-04 | Stop reason: HOSPADM

## 2020-11-04 RX ORDER — HYDROMORPHONE HYDROCHLORIDE 1 MG/ML
0.1 INJECTION, SOLUTION INTRAMUSCULAR; INTRAVENOUS; SUBCUTANEOUS
Status: DISCONTINUED | OUTPATIENT
Start: 2020-11-04 | End: 2020-11-04 | Stop reason: HOSPADM

## 2020-11-04 RX ORDER — ONDANSETRON 2 MG/ML
INJECTION INTRAMUSCULAR; INTRAVENOUS PRN
Status: DISCONTINUED | OUTPATIENT
Start: 2020-11-04 | End: 2020-11-04 | Stop reason: SURG

## 2020-11-04 RX ORDER — ROPIVACAINE HYDROCHLORIDE 5 MG/ML
INJECTION, SOLUTION EPIDURAL; INFILTRATION; PERINEURAL
Status: DISCONTINUED | OUTPATIENT
Start: 2020-11-04 | End: 2020-11-04 | Stop reason: HOSPADM

## 2020-11-04 RX ORDER — ONDANSETRON 2 MG/ML
4 INJECTION INTRAMUSCULAR; INTRAVENOUS
Status: DISCONTINUED | OUTPATIENT
Start: 2020-11-04 | End: 2020-11-04 | Stop reason: HOSPADM

## 2020-11-04 RX ORDER — EPINEPHRINE 1 MG/ML(1)
AMPUL (ML) INJECTION
Status: DISCONTINUED | OUTPATIENT
Start: 2020-11-04 | End: 2020-11-04 | Stop reason: HOSPADM

## 2020-11-04 RX ADMIN — SODIUM CHLORIDE, POTASSIUM CHLORIDE, SODIUM LACTATE AND CALCIUM CHLORIDE: 600; 310; 30; 20 INJECTION, SOLUTION INTRAVENOUS at 09:04

## 2020-11-04 RX ADMIN — PROPOFOL 200 MG: 10 INJECTION, EMULSION INTRAVENOUS at 10:48

## 2020-11-04 RX ADMIN — CEFAZOLIN 2 G: 1 INJECTION, POWDER, FOR SOLUTION INTRAVENOUS at 10:44

## 2020-11-04 RX ADMIN — EPHEDRINE SULFATE 10 MG: 50 INJECTION INTRAMUSCULAR; INTRAVENOUS; SUBCUTANEOUS at 11:14

## 2020-11-04 RX ADMIN — DEXAMETHASONE SODIUM PHOSPHATE 8 MG: 4 INJECTION, SOLUTION INTRAMUSCULAR; INTRAVENOUS at 10:48

## 2020-11-04 RX ADMIN — BUPIVACAINE HYDROCHLORIDE 20 ML: 2.5 INJECTION, SOLUTION EPIDURAL; INFILTRATION; INTRACAUDAL; PERINEURAL at 10:38

## 2020-11-04 RX ADMIN — MIDAZOLAM HYDROCHLORIDE 2 MG: 1 INJECTION, SOLUTION INTRAMUSCULAR; INTRAVENOUS at 10:36

## 2020-11-04 RX ADMIN — ONDANSETRON 4 MG: 2 INJECTION INTRAMUSCULAR; INTRAVENOUS at 12:25

## 2020-11-04 ASSESSMENT — FIBROSIS 4 INDEX: FIB4 SCORE: 3.45

## 2020-11-04 NOTE — ANESTHESIA PREPROCEDURE EVALUATION
Relevant Problems   NEURO   (+) History of squamous cell carcinoma      GI   (+) Gastroesophageal reflux disease with esophagitis      ENDO   (+) Acquired hypothyroidism       Physical Exam    Airway   Mallampati: II  TM distance: >3 FB  Neck ROM: full       Cardiovascular - normal exam  Rhythm: regular  Rate: normal  (-) murmur     Dental - normal exam           Pulmonary - normal exam  Breath sounds clear to auscultation     Abdominal    Neurological - normal exam                 Anesthesia Plan    ASA 2       Plan - general and peripheral nerve block     Peripheral nerve block will be post-op pain control  Airway plan will be LMA        Induction: intravenous    Postoperative Plan: Postoperative administration of opioids is intended.    Pertinent diagnostic labs and testing reviewed    Informed Consent:    Anesthetic plan and risks discussed with patient.    Use of blood products discussed with: patient whom consented to blood products.

## 2020-11-04 NOTE — DISCHARGE INSTRUCTIONS
ACTIVITY: Rest and take it easy for the first 24 hours.  A responsible adult is recommended to remain with you during that time.  It is normal to feel sleepy.  We encourage you to not do anything that requires balance, judgment or coordination.    MILD FLU-LIKE SYMPTOMS ARE NORMAL. YOU MAY EXPERIENCE GENERALIZED MUSCLE ACHES, THROAT IRRITATION, HEADACHE AND/OR SOME NAUSEA.    FOR 24 HOURS DO NOT:  Drive, operate machinery or run household appliances.  Drink beer or alcoholic beverages.   Make important decisions or sign legal documents.    SPECIAL INSTRUCTIONS: No weight bearing to operative extremity. Ice and elevate.    DIET: To avoid nausea, slowly advance diet as tolerated, avoiding spicy or greasy foods for the first day.  Add more substantial food to your diet according to your physician's instructions.  INCREASE FLUIDS AND FIBER TO AVOID CONSTIPATION.    SURGICAL DRESSING/BATHING: Keep dressings clean and dry. May shower in 7 days with wound covered.    FOLLOW-UP APPOINTMENT:  A follow-up appointment should be arranged with your doctor; call to schedule.    You should CALL YOUR PHYSICIAN if you develop:  Fever greater than 101 degrees F.  Pain not relieved by medication, or persistent nausea or vomiting.  Excessive bleeding (blood soaking through dressing) or unexpected drainage from the wound.  Extreme redness or swelling around the incision site, drainage of pus or foul smelling drainage.  Inability to urinate or empty your bladder within 8 hours.    You should call 911 if you develop problems with breathing or chest pain.    If you are unable to contact your doctor or surgical center, you should go to the nearest emergency room or urgent care center.      Physician's telephone #: Dr. Calabrese [phone]    If any questions arise, call your doctor.  If your doctor is not available, please feel free to call the Surgical Center at [phone]. The Contact Center is open Monday through Friday 7AM to 5PM  and may speak to a nurse at [phone], or toll free at [phone].     A registered nurse may call you a few days after your surgery to see how you are doing after your procedure.    MEDICATIONS: Resume taking daily medication.  Take prescribed pain medication with food.  If no medication is prescribed, you may take non-aspirin pain medication if needed.  PAIN MEDICATION CAN BE VERY CONSTIPATING.  Take a stool softener or laxative such as senokot, pericolace, or milk of magnesia if needed.    Prescription given pre-op.      Last pain medication given: None.    If your physician has prescribed pain medication that includes Acetaminophen (Tylenol), do not take additional Acetaminophen (Tylenol) while taking the prescribed medication.    Depression / Suicide Risk    As you are discharged from this [facility] facility, it is important to learn how to keep safe from harming yourself.    Recognize the warning signs:  · Abrupt changes in personality, positive or negative- including increase in energy   · Giving away possessions  · Change in eating patterns- significant weight changes-  positive or negative  · Change in sleeping patterns- unable to sleep or sleeping all the time   · Unwillingness or inability to communicate  · Depression  · Unusual sadness, discouragement and loneliness  · Talk of wanting to die  · Neglect of personal appearance   · Rebelliousness- reckless behavior  · Withdrawal from people/activities they love  · Confusion- inability to concentrate     If you or a loved one observes any of these behaviors or has concerns about self-harm, here's what you can do:  · Talk about it- your feelings and reasons for harming yourself  · Remove any means that you might use to hurt yourself (examples: pills, rope, extension cords, firearm)  · Get professional help from the community (Mental Health, Substance Abuse, psychological counseling)  · Do not be alone:Call your Safe Contact- someone whom you trust  "who will be there for you.  · Call your local CRISIS HOTLINE 756-0332 or 447-095-9577  · Call your local Children's Mobile Crisis Response Team [place] (716) 584-4624 or www.[employer]  · Call the toll free National Suicide Prevention Hotlines   · National Suicide Prevention Lifeline 270-598-MRPP (0485)  [facility] Line Network 800-SUICIDE (783-3912)    Peripheral Nerve Block Discharge Instructions from Same Day Surgery and Inpatient :    What to Expect - Upper Extremity  · You may experience numbness and weakness in shoulder, arm and hand  on the same side as your surgery  · This is normal. For some people, this may be an unpleasant sensation. Be very careful with your numb limb  · Ask for help when you need it  Shoulder Surgery Side Effects  · In addition to numbness and weakness you may experience other symptoms  · Other nerves that are close to those nerves injected can also be affected by local anesthesia  · You may experience a hoarseness in your voice  · Your breathing may feel different  · You may also notice drooping of your eyelid, pupil constriction, and decreased sweating, on the side of your surgery  · All of these side effects are normal and will resolve when the local anesthetic wears off   Prevent Injury  · Protect the limb like a baby  · Beware of exposing your limb to extreme heat or cold or trauma  · The limb may be injured without you noticing because it is numb  · Keep the limb elevated whenever possible  · Do not sleep on the limb  · Change the position of the limb regularly  · Avoid putting pressure on your surgical limb  Pain Control  · The initial block on the day of surgery will make your extremity feel \"numb\"  · Any consecutive injection including prior to discharge from the hospital will make your extremity feel \"numb\"  · You may feel an aching or burning when the local anesthesia starts to wear off  · Take pain pills as prescribed by your surgeon  · Call your surgeon or " anesthesiologist if you do not have adequate pain control  ·

## 2020-11-04 NOTE — ANESTHESIA TIME REPORT
Anesthesia Start and Stop Event Times     Date Time Event    11/4/2020 1010 Ready for Procedure     1044 Anesthesia Start     1233 Anesthesia Stop        Responsible Staff  11/04/20    Name Role Begin End    Tobey Gansert, M.D. Anesth 1044 1233        Preop Diagnosis (Free Text):  Pre-op Diagnosis     M75.121/S43.431A        Preop Diagnosis (Codes):    Post op Diagnosis  Rotator cuff tear      Premium Reason  Non-Premium    Comments:

## 2020-11-04 NOTE — OR NURSING
Patient allergies and NPO status verified, home medication reconciliation completed and belongings secured. Patient verbalizes understanding of pain scale, expected course of stay and plan of care. Surgical site verified with patient. Sequentials placed on legs. Triple aim and site prep completed by CNA. IV access established.

## 2020-11-04 NOTE — OR NURSING
1227: To PACU post right shoulder arthroscopy w/ block. Pt is extubated, breathing is spontaneous and unlabored. Warmer applied.  1247: Pt slightly more awake. Denies pain or nausea.  1300: Unable to sense touch to hand/fingers and is unable to move them.  1317: Meets criteria for stage ll.

## 2020-11-04 NOTE — ANESTHESIA PROCEDURE NOTES
Airway    Date/Time: 11/4/2020 10:49 AM  Performed by: Tobey Gansert, M.D.  Authorized by: Tobey Gansert, M.D.     Location:  OR  Urgency:  Elective  Indications for Airway Management:  Anesthesia      Spontaneous Ventilation: absent    Sedation Level:  Deep  Preoxygenated: Yes    Final Airway Type:  Supraglottic airway  Final Supraglottic Airway:  Standard LMA    SGA Size:  3  Number of Attempts at Approach:  1

## 2020-11-04 NOTE — OP REPORT
DATE OF SERVICE:  11/04/2020    PREOPERATIVE DIAGNOSES:  Right shoulder rotator cuff tear, possible biceps   tendon tear.    POSTOPERATIVE DIAGNOSES:  Right shoulder rotator cuff tear, right shoulder   biceps tendon tear.    PROCEDURES PERFORMED:  Evaluation of the right shoulder under anesthesia,   arthroscopic evaluation of the shoulder, arthroscopic rotator cuff repair,   arthroscopic biceps tenotomy with debridement of bicipital stump.    SURGEON:  Julio Cesar Calabrese MD    ASSISTANT:  DIANA Young    ANESTHESIA:  General.    ANESTHESIOLOGIST:  Tobey B. Gansert, MD with regional anesthesia for   postoperative analgesia.    DRAINS:  None.    SPECIMENS:  None.    COMPLICATIONS:  None known.    HISTORY:  Active 75-year-old female who has had shoulder pain for 6-8 months,   she has had a trial of rest, activity modification, and therapy shoulder   exercises, all without significant improvement.  Her history, physical, and   MRIs consistent with rotator cuff and biceps tendon tearing.  Given her   persistent symptoms and failure of nonoperative treatment over time, she is   felt to be a candidate for shoulder arthroscopy with arthroscopic versus open   repairs and/or debridement as indicated.    DESCRIPTION OF PROCEDURE:  Therefore, after appropriate laboratory studies and   consents, the patient was brought today to the operating room, placed supine   on the operating table.  General anesthesia obtained.  Ancef was administered   preoperatively.  Passive forward elevation, passive abduction appeared to be   full today.  Load and shift test revealed no significant anterior posterior   instability.  The patient was ultimately placed in a lateral decubitus   position.  All bony prominences were padded and axillary roll was placed.    Limb was sterilely prepped and draped in the usual manner.  Standard   arthroscopy portals were then created.  Scope was introduced in the   glenohumeral joint.  The undersurface  of the supraspinatus had significant   fraying and disorganization of the fibers.  The intraarticular portion of the   biceps had upwards of 50% tearing in the tendon itself that is the   intraarticular portion of the tendon.  Subscap tendon had significant fraying   and thinning, but no misael tear or retraction was appreciated.  Anterior   labrum was without wide tearing or separation.  The articular cartilage of   glenoid and humeral head had softening, but no deep fissures or flaps were   appreciated.  Posterior inferior labrum had fraying.  The most attenuated   portion of the supraspinatus when viewing from the articular side was marked   with a PDS suture.  Biceps tendon was tenotomized just distal to its origin   off the glenoid.  Using a shaver, that stump of the biceps was then debrided.    The tendon had been tenotomized with arthroscopic scissors.  The tenotomy was   shaped to create somewhat of a bulbous end to the preserved tenotomized   tendon.    Redundant fluid was milked from the glenohumeral joint.  The joint was   injected with ropivacaine trickled in through the scope cannula.  I discussed   at length the treatment options for the biceps tendon disease with the patient   and her  preoperatively.  Given her age, activity demands, previous   experience with her opposite left shoulder, opted for tenotomy only.  As such,   attention was then directed to the subacromial space.  Scope was inserted   into that space.  Accessory lateral portals were created.  Thickened prominent   bursal tissue was resected using a shaver and a thermal device.  On the   bursal surface, there was identified a large full-thickness type tear   involving the supraspinatus from its anterior most portion extending backwards   to the junction with the infraspinatus.  The bone lateral to the exposed   articular margin was roughened and then small diameter perforations were   placed in the bone to create bleeding.  Two  separate [employer] anchors were   placed just lateral to the articular margin.  Each of those anchors loaded   with 2 suture pairs.  That made for a total of 8 suture tails.  They were   passed through the leading edge of the rotator cuff in the usual manner.  The   posterior most suture tail of the anterior anchor and the anterior most tail   of the posterior anchor were crossed.  Outside anchor tape tails that are the   larger diameter tape tails were then brought out laterally, affixed to Apollo   suture anchor and fixed laterally.  This step was repeated twice, one for the   anterior anchor construct and once for the posterior anchor construct.    Smaller diameter tape tails were preserved.  They were repaired and   arthroscopic knots were then tied producing medial row fixation.  To assist in   further fixation and add another purchase point as this patient was   identified to have somewhat softened bone in particular with the lateral most   anchor placement, a single loop stitch was created.  Those loop tails were   brought out laterally, affixed to another, this will be the third Apollo   anchor and delivered in the base of the greater tuberosity, so 3 anchors on   the lateral aspect of the greater tuberosity, 2 anchors medially.  All knots   tied creating multiple areas of compression and fixation.  There was no   obviously identified prominent spurs in subacromial space.  As such, no   further bony work was felt needed.  Scope was briefly reinserted in the   glenohumeral joint.  The undersurface of the rotator cuff repair was   inspected, felt to have good position and tension.  Scope was then removed.    Arthroscopy portals closed in usual manner.  Sterile dressing was applied.    Patient was ultimately transferred from the OR table to her hospital bed and   taken to recovery room in stable condition.       ____________________________________     MD KELSY CULP / ALEA    DD:  11/04/2020  12:34:11  DT:  11/04/2020 14:36:28    D#:  [id number]  Job#:  [id number]

## 2020-11-04 NOTE — ANESTHESIA PROCEDURE NOTES
Peripheral Block    Date/Time: 11/4/2020 10:36 AM  Performed by: Tobey Gansert, M.D.  Authorized by: Tobey Gansert, M.D.     Start Time:  11/4/2020 10:36 AM  End Time:  11/4/2020 10:41 AM  Reason for Block: at surgeon's request and post-op pain management    patient identified, IV checked, site marked, risks and benefits discussed, surgical consent, monitors and equipment checked, pre-op evaluation and timeout performed    Patient Position:  Supine  Prep: ChloraPrep    Monitoring:  Heart rate, continuous pulse ox and cardiac monitor  Block Region:  Upper Extremity  Upper Extremity - Block Type:  BRACHIAL PLEXUS block, Interscalene approach    Laterality:  Left  Procedures: ultrasound guided  Image captured, interpreted and electronically stored.  Local Infiltration:  Lidocaine  Strength:  1 %  Dose:  3 ml  Block Type:  Single-shot  Needle Length:  50mm  Needle Gauge:  22 G  Needle Localization:  Ultrasound guidance  Injection Assessment:  Negative aspiration for heme, no paresthesia on injection, incremental injection and local visualized surrounding nerve on ultrasound  Evidence of intravascular injection: No     US Guided Interscalene Brachial Plexus Block   US transducer placed on the neck in oblique plane approximately at the level of C6.  Anterior and Middle Scalene (MSM) muscles identified with nerve trunks identified between the muscles.  Needle inserted lateral to probe and advanced under direct visualization through the MSM into a perineural position.  After negative aspiration, LA injected with ease and visualized surrounding the nerve trunks.

## 2021-01-14 PROCEDURE — 0001A PFIZER SARS-COV-2 VACCINE: CPT

## 2021-01-14 PROCEDURE — 91300 PFIZER SARS-COV-2 VACCINE: CPT

## 2021-01-15 ENCOUNTER — IMMUNIZATION (OUTPATIENT)
Dept: FAMILY PLANNING/WOMEN'S HEALTH CLINIC | Facility: IMMUNIZATION CENTER | Age: 76
End: 2021-01-15
Payer: MEDICARE

## 2021-01-15 DIAGNOSIS — Z23 ENCOUNTER FOR VACCINATION: Primary | ICD-10-CM

## 2021-01-15 DIAGNOSIS — Z23 NEED FOR VACCINATION: ICD-10-CM

## 2021-01-21 DIAGNOSIS — Z23 NEED FOR VACCINATION: ICD-10-CM

## 2021-02-05 ENCOUNTER — IMMUNIZATION (OUTPATIENT)
Dept: FAMILY PLANNING/WOMEN'S HEALTH CLINIC | Facility: IMMUNIZATION CENTER | Age: 76
End: 2021-02-05
Attending: INTERNAL MEDICINE
Payer: MEDICARE

## 2021-02-05 DIAGNOSIS — Z23 ENCOUNTER FOR VACCINATION: Primary | ICD-10-CM

## 2021-02-05 DIAGNOSIS — Z23 NEED FOR VACCINATION: ICD-10-CM

## 2021-02-05 PROCEDURE — 91300 PFIZER SARS-COV-2 VACCINE: CPT

## 2021-02-05 PROCEDURE — 0002A PFIZER SARS-COV-2 VACCINE: CPT

## 2021-05-11 DIAGNOSIS — E03.9 HYPOTHYROIDISM, UNSPECIFIED TYPE: ICD-10-CM

## 2021-05-11 RX ORDER — LEVOTHYROXINE SODIUM 50 MCG
TABLET ORAL
Qty: 90 TABLET | Refills: 0 | Status: SHIPPED | OUTPATIENT
Start: 2021-05-11 | End: 2021-08-17

## 2021-08-02 DIAGNOSIS — R35.0 URINE FREQUENCY: ICD-10-CM

## 2021-08-02 DIAGNOSIS — D69.6 THROMBOCYTOPENIA (HCC): ICD-10-CM

## 2021-08-02 DIAGNOSIS — E78.00 PURE HYPERCHOLESTEROLEMIA: ICD-10-CM

## 2021-08-02 DIAGNOSIS — E03.9 HYPOTHYROIDISM, UNSPECIFIED TYPE: ICD-10-CM

## 2021-08-02 DIAGNOSIS — M81.0 OSTEOPOROSIS OF LOWER LEG WITHOUT PATHOLOGICAL FRACTURE: ICD-10-CM

## 2021-08-06 ENCOUNTER — HOSPITAL ENCOUNTER (OUTPATIENT)
Facility: MEDICAL CENTER | Age: 76
End: 2021-08-06
Attending: INTERNAL MEDICINE
Payer: MEDICARE

## 2021-08-06 ENCOUNTER — NON-PROVIDER VISIT (OUTPATIENT)
Dept: INTERNAL MEDICINE | Facility: IMAGING CENTER | Age: 76
End: 2021-08-06
Payer: MEDICARE

## 2021-08-06 DIAGNOSIS — M81.0 OSTEOPOROSIS OF LOWER LEG WITHOUT PATHOLOGICAL FRACTURE: ICD-10-CM

## 2021-08-06 DIAGNOSIS — R35.0 URINE FREQUENCY: ICD-10-CM

## 2021-08-06 DIAGNOSIS — E78.00 PURE HYPERCHOLESTEROLEMIA: ICD-10-CM

## 2021-08-06 DIAGNOSIS — D69.6 THROMBOCYTOPENIA (HCC): ICD-10-CM

## 2021-08-06 DIAGNOSIS — E03.9 HYPOTHYROIDISM, UNSPECIFIED TYPE: ICD-10-CM

## 2021-08-06 LAB
25(OH)D3 SERPL-MCNC: 55 NG/ML (ref 30–100)
ALBUMIN SERPL BCP-MCNC: 4.6 G/DL (ref 3.2–4.9)
ALBUMIN/GLOB SERPL: 1.5 G/DL
ALP SERPL-CCNC: 74 U/L (ref 30–99)
ALT SERPL-CCNC: 15 U/L (ref 2–50)
ANION GAP SERPL CALC-SCNC: 9 MMOL/L (ref 7–16)
APPEARANCE UR: CLEAR
AST SERPL-CCNC: 23 U/L (ref 12–45)
BASOPHILS # BLD AUTO: 0.5 % (ref 0–1.8)
BASOPHILS # BLD: 0.03 K/UL (ref 0–0.12)
BILIRUB SERPL-MCNC: 0.5 MG/DL (ref 0.1–1.5)
BILIRUB UR QL STRIP.AUTO: NEGATIVE
BUN SERPL-MCNC: 24 MG/DL (ref 8–22)
CALCIUM SERPL-MCNC: 9.7 MG/DL (ref 8.5–10.5)
CHLORIDE SERPL-SCNC: 101 MMOL/L (ref 96–112)
CHOLEST SERPL-MCNC: 204 MG/DL (ref 100–199)
CO2 SERPL-SCNC: 27 MMOL/L (ref 20–33)
COLOR UR: YELLOW
CREAT SERPL-MCNC: 0.85 MG/DL (ref 0.5–1.4)
EOSINOPHIL # BLD AUTO: 0.1 K/UL (ref 0–0.51)
EOSINOPHIL NFR BLD: 1.6 % (ref 0–6.9)
ERYTHROCYTE [DISTWIDTH] IN BLOOD BY AUTOMATED COUNT: 43.6 FL (ref 35.9–50)
GLOBULIN SER CALC-MCNC: 3 G/DL (ref 1.9–3.5)
GLUCOSE SERPL-MCNC: 95 MG/DL (ref 65–99)
GLUCOSE UR STRIP.AUTO-MCNC: NEGATIVE MG/DL
HCT VFR BLD AUTO: 47.4 % (ref 37–47)
HDLC SERPL-MCNC: 53 MG/DL
HGB BLD-MCNC: 15.2 G/DL (ref 12–16)
IMM GRANULOCYTES # BLD AUTO: 0.02 K/UL (ref 0–0.11)
IMM GRANULOCYTES NFR BLD AUTO: 0.3 % (ref 0–0.9)
KETONES UR STRIP.AUTO-MCNC: NEGATIVE MG/DL
LDLC SERPL CALC-MCNC: 133 MG/DL
LEUKOCYTE ESTERASE UR QL STRIP.AUTO: NEGATIVE
LYMPHOCYTES # BLD AUTO: 1.82 K/UL (ref 1–4.8)
LYMPHOCYTES NFR BLD: 29.4 % (ref 22–41)
MCH RBC QN AUTO: 29.1 PG (ref 27–33)
MCHC RBC AUTO-ENTMCNC: 32.1 G/DL (ref 33.6–35)
MCV RBC AUTO: 90.8 FL (ref 81.4–97.8)
MICRO URNS: NORMAL
MONOCYTES # BLD AUTO: 0.52 K/UL (ref 0–0.85)
MONOCYTES NFR BLD AUTO: 8.4 % (ref 0–13.4)
NEUTROPHILS # BLD AUTO: 3.7 K/UL (ref 2–7.15)
NEUTROPHILS NFR BLD: 59.8 % (ref 44–72)
NITRITE UR QL STRIP.AUTO: NEGATIVE
NRBC # BLD AUTO: 0 K/UL
NRBC BLD-RTO: 0 /100 WBC
PH UR STRIP.AUTO: 6 [PH] (ref 5–8)
PLATELET # BLD AUTO: 150 K/UL (ref 164–446)
PMV BLD AUTO: 12.4 FL (ref 9–12.9)
POTASSIUM SERPL-SCNC: 4.3 MMOL/L (ref 3.6–5.5)
PROT SERPL-MCNC: 7.6 G/DL (ref 6–8.2)
PROT UR QL STRIP: NEGATIVE MG/DL
RBC # BLD AUTO: 5.22 M/UL (ref 4.2–5.4)
RBC UR QL AUTO: NEGATIVE
SODIUM SERPL-SCNC: 137 MMOL/L (ref 135–145)
SP GR UR STRIP.AUTO: 1
T4 FREE SERPL-MCNC: 1.53 NG/DL (ref 0.93–1.7)
TRIGL SERPL-MCNC: 92 MG/DL (ref 0–149)
TSH SERPL DL<=0.005 MIU/L-ACNC: 1.1 UIU/ML (ref 0.38–5.33)
UROBILINOGEN UR STRIP.AUTO-MCNC: 0.2 MG/DL
WBC # BLD AUTO: 6.2 K/UL (ref 4.8–10.8)

## 2021-08-06 PROCEDURE — 84443 ASSAY THYROID STIM HORMONE: CPT

## 2021-08-06 PROCEDURE — 84439 ASSAY OF FREE THYROXINE: CPT

## 2021-08-06 PROCEDURE — 80053 COMPREHEN METABOLIC PANEL: CPT

## 2021-08-06 PROCEDURE — 85025 COMPLETE CBC W/AUTO DIFF WBC: CPT

## 2021-08-06 PROCEDURE — 82306 VITAMIN D 25 HYDROXY: CPT

## 2021-08-06 PROCEDURE — 80061 LIPID PANEL: CPT

## 2021-08-06 PROCEDURE — 81003 URINALYSIS AUTO W/O SCOPE: CPT

## 2021-08-13 ENCOUNTER — OFFICE VISIT (OUTPATIENT)
Dept: INTERNAL MEDICINE | Facility: IMAGING CENTER | Age: 76
End: 2021-08-13
Payer: MEDICARE

## 2021-08-13 VITALS
BODY MASS INDEX: 20.96 KG/M2 | SYSTOLIC BLOOD PRESSURE: 108 MMHG | WEIGHT: 111 LBS | HEIGHT: 61 IN | OXYGEN SATURATION: 94 % | DIASTOLIC BLOOD PRESSURE: 70 MMHG | HEART RATE: 80 BPM | TEMPERATURE: 98 F | RESPIRATION RATE: 12 BRPM

## 2021-08-13 DIAGNOSIS — E03.9 HYPOTHYROIDISM, UNSPECIFIED TYPE: ICD-10-CM

## 2021-08-13 DIAGNOSIS — R25.1 TREMOR: ICD-10-CM

## 2021-08-13 DIAGNOSIS — Z12.31 SCREENING MAMMOGRAM, ENCOUNTER FOR: ICD-10-CM

## 2021-08-13 DIAGNOSIS — M81.6 LOCALIZED OSTEOPOROSIS WITHOUT CURRENT PATHOLOGICAL FRACTURE: ICD-10-CM

## 2021-08-13 DIAGNOSIS — Z00.00 MEDICARE ANNUAL WELLNESS VISIT, SUBSEQUENT: ICD-10-CM

## 2021-08-13 DIAGNOSIS — E78.00 PURE HYPERCHOLESTEROLEMIA: ICD-10-CM

## 2021-08-13 PROCEDURE — G0439 PPPS, SUBSEQ VISIT: HCPCS | Performed by: INTERNAL MEDICINE

## 2021-08-13 ASSESSMENT — PATIENT HEALTH QUESTIONNAIRE - PHQ9: CLINICAL INTERPRETATION OF PHQ2 SCORE: 0

## 2021-08-13 ASSESSMENT — ENCOUNTER SYMPTOMS: GENERAL WELL-BEING: GOOD

## 2021-08-13 ASSESSMENT — ACTIVITIES OF DAILY LIVING (ADL): BATHING_REQUIRES_ASSISTANCE: 0

## 2021-08-13 ASSESSMENT — FIBROSIS 4 INDEX: FIB4 SCORE: 2.97

## 2021-08-17 DIAGNOSIS — E03.9 HYPOTHYROIDISM, UNSPECIFIED TYPE: ICD-10-CM

## 2021-08-18 RX ORDER — LEVOTHYROXINE SODIUM 50 MCG
TABLET ORAL
Qty: 90 TABLET | Refills: 3 | Status: SHIPPED | OUTPATIENT
Start: 2021-08-18 | End: 2022-08-23

## 2021-09-01 NOTE — PROGRESS NOTES
76 y.o. female presents for the following:    Patient comes in for annual health risk assessment, physical and review of laboratory. Generally she has been feeling well. She denies depression. No cognitive issues. No balance issues.     She has a history of tremor. The specific diagnosis is unclear. She was told she may have Parkinson's disease. She was previously evaluated by neurology. Her tentative diagnosis is Parkinson's but she is currently unknown medications to treat.     She  also complaints of anxiety. This has been an ongoing issue. She is currently on no treatment.     Hyperlipidemia- cholesterol is 204 with LDL cholesterol 133, HDL is 53.     Hypothyroid- clinically euthyroid. Thyroid function tests are normal.         Annual Wellness Visit/Health Risk Assessment:    Past medical:  Past Medical History:   Diagnosis Date   • Acquired hypothyroidism 1/24/2019   • Actinic keratoses 1/24/2019   • Allergic rhinitis    • Anxiety    • Cancer (HCC)     Skin   • CATARACT     Bilateral IOL   • Gastroesophageal reflux disease with esophagitis 1/24/2019   • Hemochromatosis     carrier   • History of squamous cell carcinoma 1/24/2019   • Hyperlipidemia    • Osteoporosis    • Other specified symptom associated with female genital organs     prolapse   • Pure hypercholesterolemia 5/23/2019   • Urinary bladder disorder     prolapse   • Vaginal prolapse 1/24/2019   • Vitamin D deficiency        Past surgical:  Past Surgical History:   Procedure Laterality Date   • PB SHLDR ARTHROSCOP,SURG,W/ROTAT CUFF REPB Right 11/4/2020    Procedure: ARTHROSCOPY, SHOULDER, WITH ROTATOR CUFF REPAIR;  Surgeon: Julio Cesar Calabrese M.D.;  Location: SURGERY [facility];  Service: Orthopedics   • PB ARTHROSCOPY SHOULDER SURGICAL BICEPS TENODES* Right 11/4/2020    Procedure: ARTHROSCOPY, SHOULDER, WITH BICEPS TENOTOMY;  Surgeon: Julio Cesar Calabrese M.D.;  Location: SURGERY [facility];  Service: Orthopedics   • OTHER  2018    Mohs scalp   •  CATARACT EXTRACTION WITH IOL Bilateral 2016   • SHOULDER ARTHROSCOPY W/ ROTATOR CUFF REPAIR  7/11/2012    Performed by SVETLANA MCLEOD at SURGERY [facility] ORS   • BICEPS TENDON REPAIR  7/11/2012    Performed by SVETLANA MCLEOD at SURGERY [facility] ORS   • SHOULDER DECOMPRESSION  7/11/2012    Performed by SVETLANA MCLEOD at SURGERY [facility] ORS   • VAGINAL HYSTERECTOMY SCOPE TOTAL  8/24/2011    Performed by QAMAR COPELAND at SURGERY SAME DAY [facility] ORS   • ANTERIOR AND POSTERIOR REPAIR  8/24/2011    Performed by QAMAR COPELAND at SURGERY SAME DAY [facility] ORS   • TUBAL LIGATION  1980   • OTHER      wisdom  teeth       Family history: relating to possible risk factors for your patient  Family History   Problem Relation Age of Onset   • Diabetes Other    • Lung Disease Mother        Current Providers (including home care/DME’s):   Colonoscopy 6/6/13 Repeat in 10 Dexa  3/18/19  osteoporosis Mammo  9/10/20   [facility]       Patient Care Team:  Eldon Ross M.D. as PCP - General (Internal Medicine)  Yuliet Lucio R.N. as Registered Nurse      Medications:   Current Outpatient Medications Ordered in Epic   Medication Sig Dispense Refill   • sertraline (ZOLOFT) 50 MG Tab Take 1 Tablet by mouth every day. 90 Tablet 3   • SYNTHROID 50 MCG Tab Take 1 tablet by mouth once daily 90 Tablet 3   • Loratadine (CLARITIN) 10 MG Cap Take  by mouth every day.     • acetaminophen (TYLENOL) 325 MG Tab Take 650 mg by mouth every four hours as needed.     • Mouthwashes (BIOTENE DRY MOUTH) Liquid Spray  in mouth/throat 4 times a day.     • Cholecalciferol (VITAMIN D3) 2000 UNIT Cap Take 2,000 Units by mouth every day.       No current [facility]-ordered facility-administered medications on file.       Supplements (calcium/vitamins): if not lisited in medications    No chief complaint on file.        HPI:  Kimmy Diaz is a 76 y.o. here for Medicare Annual Wellness Visit     Patient Active Problem List    Diagnosis Date  Noted   • Pure hypercholesterolemia 05/23/2019   • Actinic keratoses 01/24/2019   • History of squamous cell carcinoma 01/24/2019   • Acquired hypothyroidism 01/24/2019   • Vaginal prolapse 01/24/2019   • Gastroesophageal reflux disease with esophagitis 01/24/2019   • Osteoporosis of lower leg without pathological fracture 07/10/2011       Current Outpatient Medications   Medication Sig Dispense Refill   • sertraline (ZOLOFT) 50 MG Tab Take 1 Tablet by mouth every day. 90 Tablet 3   • SYNTHROID 50 MCG Tab Take 1 tablet by mouth once daily 90 Tablet 3   • Loratadine (CLARITIN) 10 MG Cap Take  by mouth every day.     • acetaminophen (TYLENOL) 325 MG Tab Take 650 mg by mouth every four hours as needed.     • Mouthwashes (BIOTENE DRY MOUTH) Liquid Spray  in mouth/throat 4 times a day.     • Cholecalciferol (VITAMIN D3) 2000 UNIT Cap Take 2,000 Units by mouth every day.       No current facility-administered medications for this visit.            Current supplements as per medication list.       Allergies: Augmentin [amoxicillin-pot clavulanate], Iodine, Mercury, and Other environmental    Current social contact/activities:  Social with friends and family..    She  reports that she has never smoked. She has never used smokeless tobacco. She reports current alcohol use. She reports that she does not use drugs.  Counseling given: Not Answered        DPA/Advanced Directive:  Has not completed       ROS:    Gait: Uses : None  Ostomy: No  Other tubes: no   Amputations: no   Chronic oxygen use: no   Last eye exam: 1 year   : Denies any urinary leakage during the last 6 months incontinence.       Screening:  Colonoscopy 6/6/13 Repeat in 10 Dexa  3/18/19  osteoporosis Mammo  9/10/20   [facility]       Depression Screening    Little interest or pleasure in doing things?  0 - not at all  Feeling down, depressed , or hopeless? 0 - not at all  Patient Health Questionnaire Score: 0     If depressive symptoms identified deferred to  follow up visit unless specifically addressed in assessment and plan.    Interpretation of PHQ-9 Total Score   Score Severity   1-4 No Depression   5-9 Mild Depression   10-14 Moderate Depression   15-19 Moderately Severe Depression   20-27 Severe Depression    Screening for Cognitive Impairment    Three Minute Recall (captain, garden, picture) 0/3    Ramesh clock face with all 12 numbers and set the hands to show 5 past 8.  Yes    Cognitive concerns identified deferred for follow up unless specifically addressed in assessment and plan.    Fall Risk Assessment    Has the patient had two or more falls in the last year or any fall with injury in the last year?  Yes    Safety Assessment    Throw rugs on floor.  Yes  Handrails on all stairs.  Yes  Good lighting in all hallways.  Yes  Difficulty hearing.  No  Patient counseled about all safety risks that were identified.    Functional Assessment ADLs    Are there any barriers preventing you from cooking for yourself or meeting nutritional needs?  No.    Are there any barriers preventing you from driving safely or obtaining transportation?  No.    Are there any barriers preventing you from using a telephone or calling for help?  No.    Are there any barriers preventing you from shopping?  No.    Are there any barriers preventing you from taking care of your own finances?  No.    Are there any barriers preventing you from managing your medications?  No.    Are there any barriers preventing you from showering, bathing or dressing yourself?  No.    Are you currently engaging in any exercise or physical activity?  Yes.  Treadmill, floor stretching  What is your perception of your health?  Good.      Health Maintenance Summary                IMM INFLUENZA Overdue 9/1/2021      Done 9/24/2020 Imm Admin: Influenza Vaccine Adult HD     Patient has more history with this topic...    MAMMOGRAM Next Due 9/10/2021      Done 9/10/2020 [id number]     Patient has more history  with this topic...    Annual Wellness Visit Next Due 8/14/2022      Done 8/13/2021 Visit Dx: Medicare annual wellness visit, subsequent     Patient has more history with this topic...    COLORECTAL CANCER SCREENING Next Due 6/6/2023     BONE DENSITY Next Due 3/18/2024      Done 3/18/2019 DS-BONE DENSITY STUDY (DEXA)     Patient has more history with this topic...    IMM DTaP/Tdap/Td Vaccine Next Due 8/25/2026      Done 8/25/2016 Imm Admin: Tdap Vaccine          Patient Care Team:  Eldon Ross M.D. as PCP - General (Internal Medicine)  Yuliet Lucio R.N. as Registered Nurse        Social History     Tobacco Use   • Smoking status: Never Smoker   • Smokeless tobacco: Never Used   Vaping Use   • Vaping Use: Never used   Substance Use Topics   • Alcohol use: Yes     Comment: once a month   • Drug use: No     Family History   Problem Relation Age of Onset   • Diabetes Other    • Lung Disease Mother      She  has a past medical history of Acquired hypothyroidism (1/24/2019), Actinic keratoses (1/24/2019), Allergic rhinitis, Anxiety, Cancer (HCC), CATARACT, Gastroesophageal reflux disease with esophagitis (1/24/2019), Hemochromatosis, History of squamous cell carcinoma (1/24/2019), Hyperlipidemia, Osteoporosis, Other specified symptom associated with female genital organs, Pure hypercholesterolemia (5/23/2019), Urinary bladder disorder, Vaginal prolapse (1/24/2019), and Vitamin D deficiency.   Past Surgical History:   Procedure Laterality Date   • PB SHLDR ARTHROSCOP,SURG,W/ROTAT CUFF REPB Right 11/4/2020    Procedure: ARTHROSCOPY, SHOULDER, WITH ROTATOR CUFF REPAIR;  Surgeon: Julio Cesar Calabrese M.D.;  Location: SURGERY [facility];  Service: Orthopedics   • PB ARTHROSCOPY SHOULDER SURGICAL BICEPS TENODES* Right 11/4/2020    Procedure: ARTHROSCOPY, SHOULDER, WITH BICEPS TENOTOMY;  Surgeon: Julio Cesar Calabrese M.D.;  Location: SURGERY [facility];  Service: Orthopedics   • OTHER  2018    Mohs scalp   • CATARACT EXTRACTION  "WITH IOL Bilateral 2016   • SHOULDER ARTHROSCOPY W/ ROTATOR CUFF REPAIR  7/11/2012    Performed by SVETLANA MCLEOD at SURGERY [facility]S   • BICEPS TENDON REPAIR  7/11/2012    Performed by SVETLANA MCLEOD at SURGERY [facility]S   • SHOULDER DECOMPRESSION  7/11/2012    Performed by SVETLANA MCLEOD at SURGERY [facility]S   • VAGINAL HYSTERECTOMY SCOPE TOTAL  8/24/2011    Performed by QAMAR COPELAND at SURGERY SAME DAY [facility]   • ANTERIOR AND POSTERIOR REPAIR  8/24/2011    Performed by QAMAR COPELAND at SURGERY SAME DAY [facility]S   • TUBAL LIGATION  1980   • OTHER      wisdom  teeth       Exam:     /70 (BP Location: Left arm, Patient Position: Sitting, BP Cuff Size: Adult)   Pulse 80   Temp 36.7 °C (98 °F) (Temporal)   Resp 12   Ht 1.549 m (5' 1\")   Wt 50.3 kg (111 lb)   SpO2 94%  Body mass index is 20.97 kg/m².    Hearing good.    Dentition good  Alert, oriented in no acute distress.  Eye contact is good, speech goal directed, affect calm  General: Well-appearing. Well-developed. No signs of distress.  HEENT: Grossly normal. Oral cavity is pink and moist.  Neck: Supple without JVD or bruit.  Pulmonary: Clear with good breath sounds. Normal effort.  Cardiovascular: Regular. Carotid and radial pulses are intact.  Abdomen: Soft, nontender, nondistended. Spleen and liver are not enlarged.  Neurologic: Cranial nerves II through XII are grossly normal, alert and oriented x3      Assessment and Plan. The following treatment and monitoring plan is recommended:    1. Medicare annual wellness visit, subsequent     2. Localized osteoporosis without current pathological fracture  DS-BONE DENSITY STUDY (DEXA)   3. Hypothyroidism, unspecified type     4. Pure hypercholesterolemia     5. Tremor     6. Screening mammogram, encounter for         76 year old female. Overall she is in good health.   Due for mammogram and bone density.     Discussed her anxiety issues. I recommend a trial of Zoloft "     Tremor issue is more complicated. I would like to discuss with her neurologist regarding the diagnosis. I would like to try Parkinson's medication such as carbidopa slash levodopa.    They were stable period no change in treatment.    Services suggested: No services required at this time  Health Care Screening: Age-appropriate preventive services Medicare covers discussed today and ordered if indicated.  Referrals offered: Community-based lifestyle interventions to reduce health risks and promote self-management and wellness, fall prevention, nutrition, physical activity, tobacco-use cessation, weight loss, and mental health services as per orders if indicated.    Discussion today about general wellness and lifestyle habits:    · Prevent falls and reduce trip hazards; Cautioned about securing or removing rugs.  · Have a working fire alarm and carbon monoxide detector;   · Engage in regular physical activity and social activities       Follow-up: 3 months

## 2021-09-14 DIAGNOSIS — R92.30 DENSE BREAST TISSUE: ICD-10-CM

## 2021-09-14 DIAGNOSIS — Z12.31 ENCOUNTER FOR SCREENING MAMMOGRAM FOR MALIGNANT NEOPLASM OF BREAST: ICD-10-CM

## 2021-10-11 ENCOUNTER — NON-PROVIDER VISIT (OUTPATIENT)
Dept: INTERNAL MEDICINE | Facility: IMAGING CENTER | Age: 76
End: 2021-10-11
Payer: MEDICARE

## 2021-10-11 DIAGNOSIS — Z23 NEED FOR INFLUENZA VACCINATION: ICD-10-CM

## 2021-10-11 PROCEDURE — G0008 ADMIN INFLUENZA VIRUS VAC: HCPCS | Performed by: INTERNAL MEDICINE

## 2021-10-11 PROCEDURE — 90662 IIV NO PRSV INCREASED AG IM: CPT | Performed by: INTERNAL MEDICINE

## 2021-10-13 ENCOUNTER — HOSPITAL ENCOUNTER (OUTPATIENT)
Dept: RADIOLOGY | Facility: MEDICAL CENTER | Age: 76
End: 2021-10-13
Attending: INTERNAL MEDICINE
Payer: MEDICARE

## 2021-10-13 DIAGNOSIS — M81.6 LOCALIZED OSTEOPOROSIS WITHOUT CURRENT PATHOLOGICAL FRACTURE: ICD-10-CM

## 2021-10-13 PROCEDURE — 77080 DXA BONE DENSITY AXIAL: CPT

## 2021-10-18 ENCOUNTER — OFFICE VISIT (OUTPATIENT)
Dept: INTERNAL MEDICINE | Facility: IMAGING CENTER | Age: 76
End: 2021-10-18
Payer: MEDICARE

## 2021-10-18 VITALS
TEMPERATURE: 97.8 F | RESPIRATION RATE: 12 BRPM | OXYGEN SATURATION: 95 % | DIASTOLIC BLOOD PRESSURE: 78 MMHG | HEART RATE: 78 BPM | SYSTOLIC BLOOD PRESSURE: 124 MMHG

## 2021-10-18 DIAGNOSIS — F41.8 SITUATIONAL ANXIETY: ICD-10-CM

## 2021-10-18 DIAGNOSIS — E03.9 HYPOTHYROIDISM, UNSPECIFIED TYPE: ICD-10-CM

## 2021-10-18 DIAGNOSIS — G20.A1 PARKINSONS DISEASE (HCC): ICD-10-CM

## 2021-10-18 DIAGNOSIS — M81.0 AGE-RELATED OSTEOPOROSIS WITHOUT CURRENT PATHOLOGICAL FRACTURE: ICD-10-CM

## 2021-10-18 PROCEDURE — 99215 OFFICE O/P EST HI 40 MIN: CPT | Performed by: INTERNAL MEDICINE

## 2021-10-18 RX ORDER — SERTRALINE HYDROCHLORIDE 100 MG/1
100 TABLET, FILM COATED ORAL DAILY
Qty: 30 TABLET | Refills: 3 | Status: SHIPPED | OUTPATIENT
Start: 2021-10-18 | End: 2021-11-15

## 2021-10-18 NOTE — PROGRESS NOTES
"Chief Complaint   Patient presents with   • Tremors   • Osteoporosis       HISTORY OF THE PRESENT ILLNESS: Patient is a 76 y.o. female.     Tremor-ongoing issues. Symptoms seem to be made worse when she is anxious. She did not start carbidopa/levodopa.    Anxiety-unclear if there is significant benefit from sertraline. She thinks she has felt better. She is noticed that her bowels are more regular. She's been sleeping well. She did state to her  when she increased to 50 mg that \"I feel like myself\".    Osteoporosis-worsening bone density based on recent DEXA. She has osteoporosis in both the spine and hip. She was previously on Fosamax. She believes that she took a for 10 years.        Allergies: Augmentin [amoxicillin-pot clavulanate], Iodine, Mercury, and Other environmental    Current Outpatient Medications Ordered in Epic   Medication Sig Dispense Refill   • sertraline (ZOLOFT) 100 MG Tab Take 1 Tablet by mouth every day. 30 Tablet 3   • SYNTHROID 50 MCG Tab Take 1 tablet by mouth once daily 90 Tablet 3   • Loratadine (CLARITIN) 10 MG Cap Take  by mouth every day.     • acetaminophen (TYLENOL) 325 MG Tab Take 650 mg by mouth every four hours as needed.     • Mouthwashes (BIOTENE DRY MOUTH) Liquid Spray  in mouth/throat 4 times a day.     • Cholecalciferol (VITAMIN D3) 2000 UNIT Cap Take 2,000 Units by mouth every day.       No current [facility]-ordered facility-administered medications on file.       Past medical history, social history and family history were reviewed from chart today    Review of systems: Per HPI.    Denies headache, chest pain, fever, chills, diarrhea, constipation, abdominal pain, palpitations, depression   All others negative.     Exam: /78 (BP Location: Left arm, Patient Position: Sitting, BP Cuff Size: Adult)   Pulse 78   Temp 36.6 °C (97.8 °F) (Temporal)   Resp 12   SpO2 95%   General: Well-appearing. Well-developed. No signs of distress.  HEENT: Grossly normal. Oral cavity " is pink and moist.  Neck: Supple without JVD or bruit.  Pulmonary: Clear with good breath sounds. Normal effort.  Cardiovascular: Regular. Carotid and radial pulses are intact.  Abdomen: Soft, nontender, nondistended. Spleen and liver are not enlarged.  Neurologic: Cranial nerves II through XII are grossly normal, alert and oriented x3      Diagnosis:  1. Parkinsons disease (HCC)     2. Hypothyroidism, unspecified type     3. Situational anxiety     4. Age-related osteoporosis without current pathological fracture         Assessment:    Suspect she has Parkinson's based on tremor and decrease facial expression as well as low amplitude voice.    Situational anxiety. As she reflects on her symptoms it sounds as though she is improved to some degree.    Worsening osteoporosis. Risks and benefits of available medications discussed  Plan:  increase sertraline to 100 mg for two weeks  consider starting carbidopa/levodopa in two weeks.  Consider starting Prolia. All medication options including Fosamax, Reclast, Prolia and PTH related medications were discussed including risks and benefits.    45 minutes was spent with the patient in today's consultation. More than 30 minutes was spent in counseling and coordinating care

## 2021-11-01 ENCOUNTER — OFFICE VISIT (OUTPATIENT)
Dept: INTERNAL MEDICINE | Facility: IMAGING CENTER | Age: 76
End: 2021-11-01
Payer: MEDICARE

## 2021-11-01 VITALS
TEMPERATURE: 97.7 F | RESPIRATION RATE: 12 BRPM | HEART RATE: 68 BPM | OXYGEN SATURATION: 96 % | SYSTOLIC BLOOD PRESSURE: 120 MMHG | DIASTOLIC BLOOD PRESSURE: 78 MMHG

## 2021-11-01 DIAGNOSIS — M81.0 AGE-RELATED OSTEOPOROSIS WITHOUT CURRENT PATHOLOGICAL FRACTURE: ICD-10-CM

## 2021-11-01 DIAGNOSIS — F41.8 SITUATIONAL ANXIETY: ICD-10-CM

## 2021-11-01 DIAGNOSIS — R25.1 TREMOR: ICD-10-CM

## 2021-11-01 PROCEDURE — 99214 OFFICE O/P EST MOD 30 MIN: CPT | Performed by: INTERNAL MEDICINE

## 2021-11-02 RX ORDER — DENOSUMAB 60 MG/ML
60 INJECTION SUBCUTANEOUS ONCE
Qty: 1 ML | Refills: 0 | Status: SHIPPED
Start: 2021-11-02 | End: 2021-11-02

## 2021-11-02 NOTE — PROGRESS NOTES
Chief Complaint   Patient presents with   • Tremors       HISTORY OF THE PRESENT ILLNESS: Patient is a 76 y.o. female.     Patient comes in with her  for short-term follow-up after increase in sertraline for anxiety.  Patient also has tremor.  She reports today that the tremor is only associated with anxiety.  She feels that the sertraline may have helped slightly but she remains tremulous.  Symptoms are further exacerbated when she is more anxious such as social situation.  She had been tentatively diagnosed with Parkinson's but she reports that when she is not anxious that she is not tremulous.    We also reviewed     Allergies: Augmentin [amoxicillin-pot clavulanate], Iodine, Mercury, and Other environmental    Current Outpatient Medications Ordered in Epic   Medication Sig Dispense Refill   • denosumab (PROLIA) 60 MG/ML Solution Prefilled Syringe Inject 1 mL under the skin one time for 1 dose. 1 mL 0   • sertraline (ZOLOFT) 100 MG Tab Take 1 Tablet by mouth every day. 30 Tablet 3   • SYNTHROID 50 MCG Tab Take 1 tablet by mouth once daily 90 Tablet 3   • Loratadine (CLARITIN) 10 MG Cap Take  by mouth every day.     • acetaminophen (TYLENOL) 325 MG Tab Take 650 mg by mouth every four hours as needed.     • Mouthwashes (BIOTENE DRY MOUTH) Liquid Spray  in mouth/throat 4 times a day.     • Cholecalciferol (VITAMIN D3) 2000 UNIT Cap Take 2,000 Units by mouth every day.       No current [facility]-ordered facility-administered medications on file.       Past medical history, social history and family history were reviewed from chart today    Review of systems: Per HPI.    All others negative.     Exam: /78 (BP Location: Left arm, Patient Position: Sitting, BP Cuff Size: Adult)   Pulse 68   Temp 36.5 °C (97.7 °F) (Temporal)   Resp 12   SpO2 96%   General: Well-appearing. Well-developed. No signs of distress.  HEENT: Grossly normal. Oral cavity is pink and moist.  Neck: Supple without JVD or  bruit.  Pulmonary: Clear with good breath sounds. Normal effort.  Cardiovascular: Regular. Carotid and radial pulses are intact.  Abdomen: Soft, nontender, nondistended. Spleen and liver are not enlarged.  Neurologic: Right-sided tremor with pill-rolling-like motion      Diagnosis:  1. Tremor     2. Situational anxiety     3. Age-related osteoporosis without current pathological fracture  denosumab (PROLIA) 60 MG/ML Solution Prefilled Syringe       Patient with ongoing tremor.  I had originally felt this was Parkinson's-like with associated symptoms including decreased facial expression, low amplitude voice and bradykinesia like movement.  The patient reports that the symptoms are only present when she is anxious and worse when she has acute increase in anxiety such as social situations.    Based on her symptoms I think we should more aggressively treat her anxiety.  We will continue with the sertraline 100 mg for two additional weeks.  This will complete 4 weeks of therapy.  Patient does not respond then I recommend tapering off and starting Effexor.    Hold on carbidopa/levodopa    We discussed her osteoporosis.  After reviewing the options with her and her .  We have agreed to proceed with Prolia.  She has previously completed a multiyear course of Fosamax.  She has had worsening bone density.

## 2021-11-05 DIAGNOSIS — M81.0 AGE-RELATED OSTEOPOROSIS WITHOUT CURRENT PATHOLOGICAL FRACTURE: ICD-10-CM

## 2021-11-15 ENCOUNTER — OFFICE VISIT (OUTPATIENT)
Dept: INTERNAL MEDICINE | Facility: IMAGING CENTER | Age: 76
End: 2021-11-15
Payer: MEDICARE

## 2021-11-15 VITALS
HEART RATE: 77 BPM | RESPIRATION RATE: 12 BRPM | OXYGEN SATURATION: 97 % | DIASTOLIC BLOOD PRESSURE: 70 MMHG | SYSTOLIC BLOOD PRESSURE: 128 MMHG | TEMPERATURE: 97.5 F

## 2021-11-15 DIAGNOSIS — M81.0 AGE-RELATED OSTEOPOROSIS WITHOUT CURRENT PATHOLOGICAL FRACTURE: ICD-10-CM

## 2021-11-15 DIAGNOSIS — R25.1 TREMOR: ICD-10-CM

## 2021-11-15 DIAGNOSIS — F41.8 SITUATIONAL ANXIETY: ICD-10-CM

## 2021-11-15 PROCEDURE — 99213 OFFICE O/P EST LOW 20 MIN: CPT | Performed by: INTERNAL MEDICINE

## 2021-11-15 RX ORDER — VENLAFAXINE HYDROCHLORIDE 37.5 MG/1
CAPSULE, EXTENDED RELEASE ORAL
Qty: 60 CAPSULE | Refills: 3 | Status: SHIPPED | OUTPATIENT
Start: 2021-11-15 | End: 2021-12-28 | Stop reason: SDUPTHER

## 2021-11-17 NOTE — PROGRESS NOTES
Chief Complaint   Patient presents with   • Anxiety       HISTORY OF THE PRESENT ILLNESS: Patient is a 76 y.o. female.     Patient comes in for follow-up on her anxiety, tremor and bone density. She is been coming in every few weeks for reevaluation as we adjusted her medications. She is been on sertraline 100 mg for four weeks. She does not think it is offering much benefit for her anxiety. She feels her tremor is tied to her anxiety. She is scheduled for Prolia to treat her osteoporosis.      Allergies: Augmentin [amoxicillin-pot clavulanate], Iodine, Mercury, and Other environmental    Current Outpatient Medications Ordered in Epic   Medication Sig Dispense Refill   • venlafaxine XR (EFFEXOR XR) 37.5 MG CAPSULE SR 24 HR Take 1 cap by mouth daily for 7 days then increase to 2 daily 60 Capsule 3   • SYNTHROID 50 MCG Tab Take 1 tablet by mouth once daily 90 Tablet 3   • Loratadine (CLARITIN) 10 MG Cap Take  by mouth every day.     • acetaminophen (TYLENOL) 325 MG Tab Take 650 mg by mouth every four hours as needed.     • Mouthwashes (BIOTENE DRY MOUTH) Liquid Spray  in mouth/throat 4 times a day.     • Cholecalciferol (VITAMIN D3) 2000 UNIT Cap Take 2,000 Units by mouth every day.       No current [facility]-ordered facility-administered medications on file.       Past medical history, social history and family history were reviewed from chart today    Review of systems: Per HPI.    All others negative.     Exam: /70 (BP Location: Left arm, Patient Position: Sitting, BP Cuff Size: Adult)   Pulse 77   Temp 36.4 °C (97.5 °F) (Temporal)   Resp 12   SpO2 97%   General: Well-appearing. Well-developed. No signs of distress.  HEENT: Grossly normal. Oral cavity is pink and moist.  Neck: Supple without JVD or bruit.  Pulmonary: Clear with good breath sounds. Normal effort.  Cardiovascular: Regular. Carotid and radial pulses are intact.  Abdomen: Soft, nontender, nondistended. Spleen and liver are not  enlarged.  Neurologic: Right greater than left resting tremor      Diagnosis:  1. Situational anxiety     2. Tremor     3. Age-related osteoporosis without current pathological fracture           Plan:  taper off sertraline. Recommended 50 mg for one week then discontinue.  Okay to start Effexor 37.5 mg 2-3 days after discontinuing sertraline. Increase Effexor to 75 mg after one week.  Follow-up infusion center for Prolia.    Follow-up here in 1 month

## 2021-11-30 ENCOUNTER — OFFICE VISIT (OUTPATIENT)
Dept: INTERNAL MEDICINE | Facility: IMAGING CENTER | Age: 76
End: 2021-11-30
Payer: MEDICARE

## 2021-11-30 VITALS
SYSTOLIC BLOOD PRESSURE: 110 MMHG | OXYGEN SATURATION: 95 % | DIASTOLIC BLOOD PRESSURE: 74 MMHG | TEMPERATURE: 97.1 F | RESPIRATION RATE: 12 BRPM | HEART RATE: 82 BPM

## 2021-11-30 DIAGNOSIS — F41.9 ANXIETY: ICD-10-CM

## 2021-11-30 DIAGNOSIS — R05.9 COUGH: ICD-10-CM

## 2021-11-30 PROCEDURE — 99213 OFFICE O/P EST LOW 20 MIN: CPT | Performed by: INTERNAL MEDICINE

## 2021-11-30 NOTE — PROGRESS NOTES
Chief Complaint   Patient presents with   • Cough       HISTORY OF THE PRESENT ILLNESS: Patient is a 76 y.o. female.     She comes in complaining of cough.  Symptoms for 1 month.  Symptoms were acutely worse starting last week after she developed upper respiratory infection.  No wheezing.  No dyspnea.  Cough is nonproductive.  No history of smoking.  No history of asthma.    She also brought up Effexor.  She was recently started on this for anxiety.  She reports that her anxiety is much better.  She remains tremulous    Allergies: Augmentin [amoxicillin-pot clavulanate], Iodine, Mercury, and Other environmental    Current Outpatient Medications Ordered in Epic   Medication Sig Dispense Refill   • venlafaxine XR (EFFEXOR XR) 37.5 MG CAPSULE SR 24 HR Take 1 cap by mouth daily for 7 days then increase to 2 daily 60 Capsule 3   • SYNTHROID 50 MCG Tab Take 1 tablet by mouth once daily 90 Tablet 3   • Loratadine (CLARITIN) 10 MG Cap Take  by mouth every day.     • acetaminophen (TYLENOL) 325 MG Tab Take 650 mg by mouth every four hours as needed.     • Mouthwashes (BIOTENE DRY MOUTH) Liquid Spray  in mouth/throat 4 times a day.     • Cholecalciferol (VITAMIN D3) 2000 UNIT Cap Take 2,000 Units by mouth every day.       No current [facility]-ordered facility-administered medications on file.       Past medical history, social history and family history were reviewed from chart today    Review of systems: Per HPI.      All others negative.     Exam: /74 (BP Location: Right arm, Patient Position: Sitting, BP Cuff Size: Adult)   Pulse 82   Temp 36.2 °C (97.1 °F) (Temporal)   Resp 12   SpO2 95%   General: Well-appearing. Well-developed. No signs of distress.  HEENT: Grossly normal. Oral cavity is pink and moist.  Neck: Supple without JVD or bruit.  Pulmonary: Clear with good breath sounds. Normal effort.  Cardiovascular: Regular. Carotid and radial pulses are intact.  Abdomen: Soft, nontender, nondistended. Spleen and liver  are not enlarged.  Neurologic: Cranial nerves II through XII are grossly normal, alert and oriented x3      Diagnosis:  1. Cough     2. Anxiety         Cough.  Suspect bronchospasm from either allergies or combination allergies/recent infection.  Recommended trial of Breo.  Sample of 100/25 mcg given.  First dose was given in the office.  She was instructed to rinse her mouth.    Anxiety is improved.  Continue Effexor.    Tremor.  We did not discuss her tremor but I continue to suspect underlying Parkinson's disease.

## 2021-12-07 ENCOUNTER — OUTPATIENT INFUSION SERVICES (OUTPATIENT)
Dept: ONCOLOGY | Facility: MEDICAL CENTER | Age: 76
End: 2021-12-07
Attending: INTERNAL MEDICINE
Payer: MEDICARE

## 2021-12-07 VITALS
DIASTOLIC BLOOD PRESSURE: 77 MMHG | WEIGHT: 111.77 LBS | OXYGEN SATURATION: 95 % | BODY MASS INDEX: 21.94 KG/M2 | HEIGHT: 60 IN | HEART RATE: 80 BPM | SYSTOLIC BLOOD PRESSURE: 102 MMHG | RESPIRATION RATE: 18 BRPM | TEMPERATURE: 97.8 F

## 2021-12-07 DIAGNOSIS — M81.0 AGE-RELATED OSTEOPOROSIS WITHOUT CURRENT PATHOLOGICAL FRACTURE: ICD-10-CM

## 2021-12-07 LAB
CA-I BLD ISE-SCNC: 1.13 MMOL/L (ref 1.1–1.3)
CREAT BLD-MCNC: 0.7 MG/DL (ref 0.5–1.4)

## 2021-12-07 PROCEDURE — 82330 ASSAY OF CALCIUM: CPT

## 2021-12-07 PROCEDURE — 700111 HCHG RX REV CODE 636 W/ 250 OVERRIDE (IP): Mod: JG | Performed by: INTERNAL MEDICINE

## 2021-12-07 PROCEDURE — 36415 COLL VENOUS BLD VENIPUNCTURE: CPT

## 2021-12-07 PROCEDURE — 82565 ASSAY OF CREATININE: CPT

## 2021-12-07 PROCEDURE — 96372 THER/PROPH/DIAG INJ SC/IM: CPT

## 2021-12-07 RX ADMIN — DENOSUMAB 60 MG: 60 INJECTION SUBCUTANEOUS at 09:50

## 2021-12-07 ASSESSMENT — FIBROSIS 4 INDEX: FIB4 SCORE: 2.97

## 2021-12-07 NOTE — PROGRESS NOTES
Pt arrived ambulatory to [facility] for Prolia.  POC dicussed.  Pt denies active infection or recent/upcoming invasive dental procedures.  Labs drawn per pharmacy protocol.  Results reviewed, pt appropriate for treatment today.  Prolia given as ordered to back of L arm.  Pt tolerated well.  Next appointment confirmed.  Pt discharged from [facility] in NAD under self care.

## 2021-12-28 ENCOUNTER — OFFICE VISIT (OUTPATIENT)
Dept: INTERNAL MEDICINE | Facility: IMAGING CENTER | Age: 76
End: 2021-12-28
Payer: MEDICARE

## 2021-12-28 VITALS
BODY MASS INDEX: 21.95 KG/M2 | WEIGHT: 114 LBS | OXYGEN SATURATION: 97 % | HEART RATE: 84 BPM | TEMPERATURE: 98.4 F | RESPIRATION RATE: 12 BRPM | SYSTOLIC BLOOD PRESSURE: 104 MMHG | DIASTOLIC BLOOD PRESSURE: 70 MMHG

## 2021-12-28 DIAGNOSIS — F41.9 ANXIETY: ICD-10-CM

## 2021-12-28 PROCEDURE — 99213 OFFICE O/P EST LOW 20 MIN: CPT | Performed by: INTERNAL MEDICINE

## 2021-12-28 RX ORDER — VENLAFAXINE HYDROCHLORIDE 37.5 MG/1
37.5 CAPSULE, EXTENDED RELEASE ORAL DAILY
Qty: 90 CAPSULE | Refills: 3 | Status: SHIPPED | OUTPATIENT
Start: 2021-12-28 | End: 2022-04-15 | Stop reason: SDUPTHER

## 2021-12-28 ASSESSMENT — FIBROSIS 4 INDEX: FIB4 SCORE: 2.97

## 2021-12-28 NOTE — PROGRESS NOTES
Chief Complaint   Patient presents with   • Anxiety       HISTORY OF THE PRESENT ILLNESS: Patient is a 76 y.o. female.     Patient comes in with her .  She is here for follow-up on Effexor.  We are using this to treat anxiety.  She thinks there has been some benefit.  She increase the dose to 75 mg as instructed but had significant side effects including memory loss and fatigue.  She resumed the 37.5 mg dose.  She has had no significant side effects since that time.        Allergies: Augmentin [amoxicillin-pot clavulanate], Iodine, Mercury, and Other environmental    Current Outpatient Medications Ordered in Epic   Medication Sig Dispense Refill   • venlafaxine XR (EFFEXOR XR) 37.5 MG CAPSULE SR 24 HR Take 1 Capsule by mouth every day. 90 Capsule 3   • Fluticasone Furoate-Vilanterol (BREO ELLIPTA) 100-25 MCG/INH AEROSOL POWDER, BREATH ACTIVATED Inhale 1 Puff every day. 1 Each 0   • SYNTHROID 50 MCG Tab Take 1 tablet by mouth once daily 90 Tablet 3   • Loratadine (CLARITIN) 10 MG Cap Take  by mouth every day.     • acetaminophen (TYLENOL) 325 MG Tab Take 650 mg by mouth every four hours as needed.     • Mouthwashes (BIOTENE DRY MOUTH) Liquid Spray  in mouth/throat 4 times a day.     • Cholecalciferol (VITAMIN D3) 2000 UNIT Cap Take 2,000 Units by mouth every day.       No current [facility]-ordered facility-administered medications on file.       Past medical history, social history and family history were reviewed from chart today    Review of systems: Per HPI.    Denies headache, chest pain, fever, chills, diarrhea, constipation, abdominal pain, palpitations, depression   All others negative.     Exam: /70 (BP Location: Left arm, Patient Position: Sitting, BP Cuff Size: Adult)   Pulse 84   Temp 36.9 °C (98.4 °F) (Temporal)   Resp 12   Wt 51.7 kg (114 lb)   SpO2 97%   General: Well-appearing. Well-developed. No signs of distress.  HEENT: Grossly normal. Oral cavity is pink and moist.  Neck: Supple  without JVD or bruit.  Pulmonary: Clear with good breath sounds. Normal effort.  Cardiovascular: Regular. Carotid and radial pulses are intact.  Abdomen: Soft, nontender, nondistended. Spleen and liver are not enlarged.  Neurologic: Cranial nerves II through XII are grossly normal, alert and oriented x3      Diagnosis:  1. Anxiety       Continue Effexor ER 37.5 mg daily.  Follow-up when she returns from [place].

## 2022-02-07 DIAGNOSIS — U07.1 ACUTE COVID-19: ICD-10-CM

## 2022-02-07 NOTE — TELEPHONE ENCOUNTER
I am not sure where to send this. [facility] on [facility] does not have anymore supplies of it.

## 2022-02-07 NOTE — TELEPHONE ENCOUNTER
Kimmy is currently having a sore throat, body aches, and cough. Her spouse is positive for COVID as well.

## 2022-03-28 ENCOUNTER — OFFICE VISIT (OUTPATIENT)
Dept: INTERNAL MEDICINE | Facility: IMAGING CENTER | Age: 77
End: 2022-03-28
Payer: MEDICARE

## 2022-03-28 VITALS
BODY MASS INDEX: 21.34 KG/M2 | DIASTOLIC BLOOD PRESSURE: 78 MMHG | TEMPERATURE: 97 F | WEIGHT: 113 LBS | RESPIRATION RATE: 12 BRPM | HEIGHT: 61 IN | SYSTOLIC BLOOD PRESSURE: 124 MMHG | HEART RATE: 78 BPM | OXYGEN SATURATION: 98 %

## 2022-03-28 DIAGNOSIS — F41.9 ANXIETY: ICD-10-CM

## 2022-03-28 DIAGNOSIS — F41.0 PANIC ATTACK: ICD-10-CM

## 2022-03-28 PROCEDURE — 99213 OFFICE O/P EST LOW 20 MIN: CPT | Performed by: INTERNAL MEDICINE

## 2022-03-28 RX ORDER — CLONAZEPAM 0.5 MG/1
0.25 TABLET ORAL 2 TIMES DAILY PRN
Qty: 10 TABLET | Refills: 0 | Status: SHIPPED | OUTPATIENT
Start: 2022-03-28 | End: 2022-04-27

## 2022-03-28 RX ORDER — VORTIOXETINE 5 MG/1
5 TABLET, FILM COATED ORAL DAILY
Qty: 30 TABLET | Refills: 3 | Status: SHIPPED | OUTPATIENT
Start: 2022-03-28 | End: 2022-04-15

## 2022-03-28 ASSESSMENT — FIBROSIS 4 INDEX: FIB4 SCORE: 3.01

## 2022-03-28 NOTE — PROGRESS NOTES
"Chief Complaint   Patient presents with   • Anxiety              HISTORY OF THE PRESENT ILLNESS: Patient is a 77 y.o. female.     Patient comes in complaining of ongoing anxiety with episodes of severe anxiety/panic attack.  She is currently on Effexor 37.5 mg.  We tried increasing it to 75 mg but she had side effects including memory loss and fatigue.  Unfortunately, her anxiety prohibited her from traveling to [place] recently.  Overall, she feels that her baseline anxiety has improved.    Allergies: Augmentin [amoxicillin-pot clavulanate], Iodine, Mercury, and Other environmental    Current Outpatient Medications Ordered in Epic   Medication Sig Dispense Refill   • Vortioxetine HBr (TRINTELLIX) 5 MG Tab Take 5 mg by mouth every day. 30 Tablet 3   • clonazePAM (KLONOPIN) 0.5 MG Tab Take 0.5 Tablets by mouth 2 times a day as needed (anxiety) for up to 30 days. 10 Tablet 0   • venlafaxine XR (EFFEXOR XR) 37.5 MG CAPSULE SR 24 HR Take 1 Capsule by mouth every day. 90 Capsule 3   • Fluticasone Furoate-Vilanterol (BREO ELLIPTA) 100-25 MCG/INH AEROSOL POWDER, BREATH ACTIVATED Inhale 1 Puff every day. 1 Each 0   • SYNTHROID 50 MCG Tab Take 1 tablet by mouth once daily 90 Tablet 3   • Loratadine (CLARITIN) 10 MG Cap Take  by mouth every day.     • acetaminophen (TYLENOL) 325 MG Tab Take 650 mg by mouth every four hours as needed.     • Mouthwashes (BIOTENE DRY MOUTH) Liquid Spray  in mouth/throat 4 times a day.     • Cholecalciferol (VITAMIN D3) 2000 UNIT Cap Take 2,000 Units by mouth every day.       No current [facility]-ordered facility-administered medications on file.       Past medical history, social history and family history were reviewed from chart today    Review of systems: Per HPI.      All others negative.     Exam: /78 (BP Location: Left arm, Patient Position: Sitting, BP Cuff Size: Adult)   Pulse 78   Temp 36.1 °C (97 °F) (Temporal)   Resp 12   Ht 1.549 m (5' 1\")   Wt 51.3 kg (113 lb)   SpO2 98% "   General: Well-appearing. Well-developed. No signs of distress.  HEENT: Grossly normal. Oral cavity is pink and moist.   Neck: Supple without JVD or bruit.  Pulmonary: Clear with good breath sounds. Normal effort.  Cardiovascular: Regular. Carotid and radial pulses are intact.  Abdomen: Soft, nontender, nondistended. Spleen and liver are not enlarged.  Neurologic: Resting tremor in the right hand and right foot.      Diagnosis:  1. Anxiety  clonazePAM (KLONOPIN) 0.5 MG Tab   2. Panic attack  clonazePAM (KLONOPIN) 0.5 MG Tab     Patient with ongoing anxiety issues.  She is intolerant of higher dose of Effexor.  I recommended discontinuing.  Try starting Trintellix 5 mg.  Klonopin 0.5 mg 1/2 tablet twice daily as needed for severe anxiety or panic attack.  Refer to psychology.  Discussed with patient that would be helpful to get therapy for coping mechanisms.    My total time spent caring for the patient on the day of the encounter was  greater than 20 minutes.   This includes obtaining history, reviewing chart, physical exam, patient education, reviewing outside records, placing orders, interpreting tests and coordinating care.

## 2022-04-15 ENCOUNTER — OFFICE VISIT (OUTPATIENT)
Dept: INTERNAL MEDICINE | Facility: IMAGING CENTER | Age: 77
End: 2022-04-15
Payer: MEDICARE

## 2022-04-15 VITALS
BODY MASS INDEX: 21.35 KG/M2 | DIASTOLIC BLOOD PRESSURE: 74 MMHG | TEMPERATURE: 97.4 F | RESPIRATION RATE: 12 BRPM | SYSTOLIC BLOOD PRESSURE: 110 MMHG | OXYGEN SATURATION: 95 % | HEART RATE: 80 BPM | WEIGHT: 113 LBS

## 2022-04-15 DIAGNOSIS — F41.9 ANXIETY: ICD-10-CM

## 2022-04-15 DIAGNOSIS — F41.0 PANIC ATTACK: ICD-10-CM

## 2022-04-15 DIAGNOSIS — T88.7XXA MEDICATION SIDE EFFECT: ICD-10-CM

## 2022-04-15 PROCEDURE — 99215 OFFICE O/P EST HI 40 MIN: CPT | Performed by: INTERNAL MEDICINE

## 2022-04-15 RX ORDER — VENLAFAXINE HYDROCHLORIDE 37.5 MG/1
37.5 CAPSULE, EXTENDED RELEASE ORAL DAILY
Qty: 30 CAPSULE | Refills: 3 | Status: SHIPPED | OUTPATIENT
Start: 2022-04-15 | End: 2022-05-02 | Stop reason: SDUPTHER

## 2022-04-15 ASSESSMENT — FIBROSIS 4 INDEX: FIB4 SCORE: 3.01

## 2022-04-16 NOTE — PROGRESS NOTES
Chief Complaint   Patient presents with   • Anxiety       HISTORY OF THE PRESENT ILLNESS: Patient is a 77 y.o. female.     Patient comes in with her  to discuss ongoing anxiety.  We reviewed her symptoms.  She has some general symptoms but primarily situational.  She has difficulty dealing with stressful situations.  She has had panic attacks.  She typically deals with the stress by going to bed.  She was recently tried on sertraline for 6 weeks.  She was on 50 mg for 2 weeks and 100 mg for 4 weeks.  She is on no benefit.  We then tried her on Effexor XR.  She was started at 37.5 mg.  She was unable to tolerate a higher dose due to fatigue and memory loss issues.  Most recently we tried her on Trintellix 5 mg.  She took 1/2 tablet.  Initially she had vomiting.  The second dose caused nausea but no vomiting.  She discontinued the medication at that time.  I had also prescribed Klonopin that she could use as needed.  She never tried this medication.    Allergies: Augmentin [amoxicillin-pot clavulanate], Iodine, Mercury, and Other environmental    Current Outpatient Medications Ordered in Epic   Medication Sig Dispense Refill   • venlafaxine XR (EFFEXOR XR) 37.5 MG CAPSULE SR 24 HR Take 1 Capsule by mouth every day. 30 Capsule 3   • clonazePAM (KLONOPIN) 0.5 MG Tab Take 0.5 Tablets by mouth 2 times a day as needed (anxiety) for up to 30 days. 10 Tablet 0   • Fluticasone Furoate-Vilanterol (BREO ELLIPTA) 100-25 MCG/INH AEROSOL POWDER, BREATH ACTIVATED Inhale 1 Puff every day. 1 Each 0   • SYNTHROID 50 MCG Tab Take 1 tablet by mouth once daily 90 Tablet 3   • Loratadine (CLARITIN) 10 MG Cap Take  by mouth every day.     • acetaminophen (TYLENOL) 325 MG Tab Take 650 mg by mouth every four hours as needed.     • Mouthwashes (BIOTENE DRY MOUTH) Liquid Spray  in mouth/throat 4 times a day.     • Cholecalciferol (VITAMIN D3) 2000 UNIT Cap Take 2,000 Units by mouth every day.       No current Epic-ordered  facility-administered medications on file.       Past medical history, social history and family history were reviewed from chart today    Review of systems: Per HPI.    \   All others negative.     Exam: /74 (BP Location: Right arm, Patient Position: Sitting, BP Cuff Size: Adult)   Pulse 80   Temp 36.3 °C (97.4 °F) (Temporal)   Resp 12   Wt 51.3 kg (113 lb)   SpO2 95%   General: Well-nourished, well-developed. No change in appearance. No distress.  HEENT: Normocephalic.  Pulmonary: Clear.. Normal effort.  Cardiovascular: Regular   Abdomen: Normal appearing. Soft, nontender, nondistended.   Neurologic: Cranial nerves II through XII are grossly intact, alert and oriented x3    Diagnosis:  1. Anxiety     2. Panic attack     3. Medication side effect       Spent 45 minutes reviewing her chart, symptoms and discussing medications including risks and benefits.  Her  was present at today's visit.  He is a retired orthopedic surgeon.  They had many questions regarding the pharmacology behind the medications and alternative treatment pharmacologic treatment options.  I encouraged follow through with behavioral health for psychology evaluation.  This may be helpful with cognitive behavioral therapy which she could use to help de-escalate her anxiety issues.  I continue to encourage her to consider treatment for her tremor which I suspect is Parkinson's disease.  I discussed with him that this could be contributing to her other issues.    We agreed on the following treatment:    Discontinue Trintellix.  Resume Effexor 37.5 mg for 4 weeks.  Trial of Klonopin 0.5 mg.  She could start with 1/2 tablet twice daily as needed.  If she finds the 1/2 tablet ineffective she can take a second dose 30 minutes after the initial.  She already has this prescription    My total time spent caring for the patient on the day of the encounter was  greater than 45 minutes.   This includes obtaining history, reviewing chart,  physical exam, patient education, reviewing outside records, placing orders, interpreting tests and coordinating care.

## 2022-05-02 RX ORDER — VENLAFAXINE HYDROCHLORIDE 75 MG/1
75 CAPSULE, EXTENDED RELEASE ORAL DAILY
Qty: 30 CAPSULE | Refills: 3 | Status: SHIPPED | OUTPATIENT
Start: 2022-05-02 | End: 2022-05-03

## 2022-06-21 ENCOUNTER — OUTPATIENT INFUSION SERVICES (OUTPATIENT)
Dept: ONCOLOGY | Facility: MEDICAL CENTER | Age: 77
End: 2022-06-21
Attending: INTERNAL MEDICINE
Payer: MEDICARE

## 2022-06-21 VITALS
DIASTOLIC BLOOD PRESSURE: 82 MMHG | RESPIRATION RATE: 18 BRPM | OXYGEN SATURATION: 91 % | HEART RATE: 81 BPM | TEMPERATURE: 98.7 F | BODY MASS INDEX: 22.38 KG/M2 | WEIGHT: 113.98 LBS | SYSTOLIC BLOOD PRESSURE: 145 MMHG | HEIGHT: 60 IN

## 2022-06-21 DIAGNOSIS — M81.0 AGE-RELATED OSTEOPOROSIS WITHOUT CURRENT PATHOLOGICAL FRACTURE: ICD-10-CM

## 2022-06-21 LAB
CA-I BLD ISE-SCNC: 1.22 MMOL/L (ref 1.1–1.3)
CREAT BLD-MCNC: 0.8 MG/DL (ref 0.5–1.4)

## 2022-06-21 PROCEDURE — 700111 HCHG RX REV CODE 636 W/ 250 OVERRIDE (IP): Mod: JG | Performed by: INTERNAL MEDICINE

## 2022-06-21 PROCEDURE — 82330 ASSAY OF CALCIUM: CPT

## 2022-06-21 PROCEDURE — 82565 ASSAY OF CREATININE: CPT

## 2022-06-21 PROCEDURE — 36415 COLL VENOUS BLD VENIPUNCTURE: CPT

## 2022-06-21 PROCEDURE — 96372 THER/PROPH/DIAG INJ SC/IM: CPT

## 2022-06-21 RX ADMIN — DENOSUMAB 60 MG: 60 INJECTION SUBCUTANEOUS at 15:30

## 2022-06-21 ASSESSMENT — FIBROSIS 4 INDEX: FIB4 SCORE: 3.01

## 2022-06-21 NOTE — PROGRESS NOTES
Ms Diaz is here today for Prolia injection. Istat labs were drawn using butterfly to left AC, labs are within parameters for treatment. Pt denies any upcoming or recent invasive dental procedures, no loose teeth. Denies and any muscle spasms or twitching. She is taking Vitamin D and occasionally takes calcium. She states pills are large, advised pt to talk to her pharmacist and see if there is an alternative, such as chewable or liquid. Prolia was given to back of right arm, it was tolerated well. Pt was discharged home in stable condition. Will email scheduling for next appointment in six months.

## 2022-07-29 ENCOUNTER — TELEPHONE (OUTPATIENT)
Dept: INTERNAL MEDICINE | Facility: IMAGING CENTER | Age: 77
End: 2022-07-29
Payer: MEDICARE

## 2022-07-29 RX ORDER — VENLAFAXINE HYDROCHLORIDE 75 MG/1
150 CAPSULE, EXTENDED RELEASE ORAL DAILY
Qty: 60 CAPSULE | Refills: 0 | Status: SHIPPED | OUTPATIENT
Start: 2022-07-29 | End: 2022-08-10 | Stop reason: SDUPTHER

## 2022-07-29 NOTE — TELEPHONE ENCOUNTER
Kimmy is requesting increase dosing of Effexor.  Dr Ross would like her to try 150 mg daily x 1 week.

## 2022-08-10 ENCOUNTER — TELEPHONE (OUTPATIENT)
Dept: INTERNAL MEDICINE | Facility: IMAGING CENTER | Age: 77
End: 2022-08-10
Payer: MEDICARE

## 2022-08-10 RX ORDER — VENLAFAXINE HYDROCHLORIDE 75 MG/1
150 CAPSULE, EXTENDED RELEASE ORAL DAILY
Qty: 180 CAPSULE | Refills: 0 | Status: SHIPPED | OUTPATIENT
Start: 2022-08-10 | End: 2022-12-12

## 2022-08-18 ENCOUNTER — NON-PROVIDER VISIT (OUTPATIENT)
Dept: INTERNAL MEDICINE | Facility: IMAGING CENTER | Age: 77
End: 2022-08-18
Payer: MEDICARE

## 2022-08-18 DIAGNOSIS — Z03.818 ENCOUNTER FOR OBSERVATION FOR SUSPECTED EXPOSURE TO OTHER BIOLOGICAL AGENTS RULED OUT: ICD-10-CM

## 2022-08-18 LAB
EXTERNAL QUALITY CONTROL: NORMAL
SARS-COV+SARS-COV-2 AG RESP QL IA.RAPID: NEGATIVE

## 2022-08-18 PROCEDURE — 87426 SARSCOV CORONAVIRUS AG IA: CPT | Performed by: INTERNAL MEDICINE

## 2022-08-18 RX ORDER — AZITHROMYCIN 250 MG/1
TABLET, FILM COATED ORAL
Qty: 6 TABLET | Refills: 0 | Status: SHIPPED | OUTPATIENT
Start: 2022-08-18 | End: 2022-11-14

## 2022-08-22 DIAGNOSIS — E03.9 HYPOTHYROIDISM, UNSPECIFIED TYPE: ICD-10-CM

## 2022-08-23 RX ORDER — LEVOTHYROXINE SODIUM 50 MCG
TABLET ORAL
Qty: 90 TABLET | Refills: 0 | Status: SHIPPED | OUTPATIENT
Start: 2022-08-23 | End: 2022-11-14

## 2022-08-27 ENCOUNTER — APPOINTMENT (OUTPATIENT)
Dept: RADIOLOGY | Facility: MEDICAL CENTER | Age: 77
End: 2022-08-27
Attending: EMERGENCY MEDICINE
Payer: MEDICARE

## 2022-08-27 ENCOUNTER — HOSPITAL ENCOUNTER (EMERGENCY)
Facility: MEDICAL CENTER | Age: 77
End: 2022-08-28
Attending: EMERGENCY MEDICINE
Payer: MEDICARE

## 2022-08-27 DIAGNOSIS — S42.292A HUMERAL HEAD FRACTURE, LEFT, CLOSED, INITIAL ENCOUNTER: ICD-10-CM

## 2022-08-27 PROCEDURE — 73030 X-RAY EXAM OF SHOULDER: CPT | Mod: LT

## 2022-08-27 PROCEDURE — 99284 EMERGENCY DEPT VISIT MOD MDM: CPT

## 2022-08-27 RX ORDER — OXYCODONE HYDROCHLORIDE AND ACETAMINOPHEN 5; 325 MG/1; MG/1
1 TABLET ORAL EVERY 4 HOURS PRN
Qty: 28 TABLET | Refills: 0 | Status: SHIPPED | OUTPATIENT
Start: 2022-08-27 | End: 2022-09-03

## 2022-08-27 RX ORDER — ONDANSETRON 4 MG/1
4 TABLET, ORALLY DISINTEGRATING ORAL ONCE
Status: COMPLETED | OUTPATIENT
Start: 2022-08-28 | End: 2022-08-28

## 2022-08-27 RX ORDER — ONDANSETRON 4 MG/1
4 TABLET, ORALLY DISINTEGRATING ORAL EVERY 6 HOURS PRN
Qty: 10 TABLET | Refills: 0 | Status: SHIPPED | OUTPATIENT
Start: 2022-08-27

## 2022-08-27 RX ORDER — OXYCODONE HYDROCHLORIDE AND ACETAMINOPHEN 5; 325 MG/1; MG/1
1 TABLET ORAL ONCE
Status: COMPLETED | OUTPATIENT
Start: 2022-08-28 | End: 2022-08-28

## 2022-08-27 ASSESSMENT — FIBROSIS 4 INDEX: FIB4 SCORE: 3.01

## 2022-08-28 VITALS
HEIGHT: 61 IN | RESPIRATION RATE: 16 BRPM | SYSTOLIC BLOOD PRESSURE: 147 MMHG | TEMPERATURE: 97.9 F | WEIGHT: 113.98 LBS | DIASTOLIC BLOOD PRESSURE: 76 MMHG | HEART RATE: 78 BPM | BODY MASS INDEX: 21.52 KG/M2 | OXYGEN SATURATION: 96 %

## 2022-08-28 PROCEDURE — A9270 NON-COVERED ITEM OR SERVICE: HCPCS | Performed by: EMERGENCY MEDICINE

## 2022-08-28 PROCEDURE — 700102 HCHG RX REV CODE 250 W/ 637 OVERRIDE(OP): Performed by: EMERGENCY MEDICINE

## 2022-08-28 PROCEDURE — 700111 HCHG RX REV CODE 636 W/ 250 OVERRIDE (IP): Performed by: EMERGENCY MEDICINE

## 2022-08-28 RX ADMIN — OXYCODONE AND ACETAMINOPHEN 1 TABLET: 5; 325 TABLET ORAL at 00:11

## 2022-08-28 RX ADMIN — ONDANSETRON 4 MG: 4 TABLET, ORALLY DISINTEGRATING ORAL at 00:11

## 2022-08-28 NOTE — ED TRIAGE NOTES
"Pt here with c/o    Chief Complaint   Patient presents with    Fall     Pt tripped over her dog and fell on concrete    Head Injury     Unknown LOC - pt hit head on curb when she fell    Shoulder Pain     Pt presents with left shoulder and unable to use left arm       BP (!) 145/86   Pulse 86   Temp 37.1 °C (98.7 °F) (Temporal)   Resp 16   Ht 1.549 m (5' 1\")   Wt 51.7 kg (113 lb 15.7 oz)   SpO2 95%   BMI 21.54 kg/m²    "

## 2022-08-28 NOTE — ED PROVIDER NOTES
ED Provider Note    ED Provider Note    Scribed for Elsy Castillo MD by Elsy Castillo M.D.. 8/27/2022, 11:25 PM.    Primary care provider: Eldon Ross M.D.  Means of arrival: Private  History obtained from: Patient  History limited by: None    CHIEF COMPLAINT  Chief Complaint   Patient presents with    Fall     Pt tripped over her dog and fell on concrete    Head Injury     Unknown LOC - pt hit head on curb when she fell    Shoulder Pain     Pt presents with left shoulder and unable to use left arm       HPI  Kimmy Diaz is a 77 y.o. female who presents to the Emergency Department for evaluation of mechanical ground-level fall.  Patient endorses mechanical ground-level fall, she tripped over her dog and fell onto concrete.  Unsure if she struck her head, she does note she laid on the sidewalk with head on the curb for several minutes until her  could help her.  She does not think that she lost consciousness.  Patient endorsesis also significant pain to the left shoulder, range of motion very restricted secondary to pain.  Has history of Parkinson's disease, not anticoagulated.  No acute numbness nor weakness of the affected left upper extremity at this time.  No neck or back pain or injury.      REVIEW OF SYSTEMS  Pertinent positives include mechanical ground-level fall with pain restricting range of motion and swelling to left shoulder. Pertinent negatives include no loss of conscious, no vomiting, no persistent new numbness no weakness, not anticoagulated, no neck or back pain or injury, no definitive head injury, no laceration.  All other systems reviewed and negative.    PAST MEDICAL HISTORY   has a past medical history of Acquired hypothyroidism (01/24/2019), Actinic keratoses (01/24/2019), Allergic rhinitis, Anxiety, Cancer (HCC), CATARACT, Gastroesophageal reflux disease with esophagitis (01/24/2019), Hemochromatosis, History of squamous cell carcinoma (01/24/2019),  Hyperlipidemia, Osteoporosis, Other specified symptom associated with female genital organs, Parkinson's disease (tremor, stiffness, slow motion, unstable posture) ([facility]), Pure hypercholesterolemia (05/23/2019), Urinary bladder disorder, Vaginal prolapse (01/24/2019), and Vitamin D deficiency.    SURGICAL HISTORY   has a past surgical history that includes tubal ligation (1980); other; vaginal hysterectomy scope total (8/24/2011); anterior and posterior repair (8/24/2011); shoulder arthroscopy w/ rotator cuff repair (7/11/2012); biceps tendon repair (7/11/2012); shoulder decompression (7/11/2012); cataract extraction with iol (Bilateral, 2016); other (2018); shldr arthroscop,surg,w/rotat cuff repr (Right, 11/4/2020); and arthroscopy shoulder surgical biceps tenodes* (Right, 11/4/2020).    SOCIAL HISTORY  Social History     Tobacco Use    Smoking status: Never    Smokeless tobacco: Never   Vaping Use    Vaping Use: Never used   Substance Use Topics    Alcohol use: Yes     Comment: once a month    Drug use: No      Social History     Substance and Sexual Activity   Drug Use No       FAMILY HISTORY  Family History   Problem Relation Age of Onset    Diabetes Other     Lung Disease Mother        CURRENT MEDICATIONS  Home Medications       Reviewed by Lynn Venegas R.N. (Registered Nurse) on 08/27/22 at 2140  Med List Status: Partial     Medication Last Dose Status   acetaminophen (TYLENOL) 325 MG Tab Not Taking Active   azithromycin (ZITHROMAX) 250 MG Tab Not Taking Active   Cholecalciferol (VITAMIN D3) 2000 UNIT Cap 8/26/2022 Active   denosumab (PROLIA) 60 MG/ML Solution Prefilled Syringe injection  Active   Fluticasone Furoate-Vilanterol (BREO ELLIPTA) 100-25 MCG/INH AEROSOL POWDER, BREATH ACTIVATED Not Taking Active   Loratadine 10 MG Cap Not Taking Active   Mouthwashes (BIOTENE DRY MOUTH) Liquid  Active   SYNTHROID 50 MCG Tab 8/27/2022 Active   venlafaxine XR (EFFEXOR XR) 75 MG CAPSULE SR 24 HR 8/27/2022 Active  "                   ALLERGIES  Allergies   Allergen Reactions    Augmentin [Amoxicillin-Pot Clavulanate] Diarrhea     Severe diarrhea    Iodine Rash     rash    Mercury Rash     .    Other Environmental      Grasses, trees, animal dander       PHYSICAL EXAM  VITAL SIGNS: BP (!) 147/76   Pulse 78   Temp 36.6 °C (97.9 °F) (Temporal)   Resp 16   Ht 1.549 m (5' 1\")   Wt 51.7 kg (113 lb 15.7 oz)   SpO2 96%   BMI 21.54 kg/m²     General: Alert, mild acute distress, appears uncomfortable  Skin: Warm, dry, normal for ethnicity  Head: Normocephalic, atraumatic; no ochoa sign, no hematoma noted on the scalp.  Neck: Trachea midline, no tenderness to posterior C-spine, no step-off.  Eye: PERRL, normal conjunctiva  ENMT: Oral mucosa moist, no pharyngeal erythema or exudate  Cardiovascular: Regular rate and rhythm, No murmur, Normal peripheral perfusion  Respiratory: Lungs CTA, respirations are non-labored, breath sounds are equal  Gastrointestinal: Soft, nontender, non distended  Musculoskeletal: No tenderness to palpation down the midline of the T and L-spine, no step-off.  Left shoulder does appear to be swollen, there is significant tenderness both anteriorly as well as over the posterior lateral aspect of the shoulder.  Range of motion severely restricted secondary to pain.  No misael deformity.  Distal function is intact in the left hand, patient has 2+ radial pulses and brisk capillary refill.  Neurological: Alert and oriented to person, place, time, and situation.  Cranial nerves II through XII are grossly intact.  Lower extremity strength and sensation 5-5 and symmetrical bilaterally.  Patient demonstrates no pronator drift.  Exam is somewhat limited given the left upper extremity injury, however 5 x 5  and extension noted with regard to the digits of the left hand; sensation fully intact throughout.  Right upper extremity is 5 x 5.  Resting tremor noted consistent with history of Parkinson's " "disease.  Psychiatric: Cooperative, appropriate mood & affect        RADIOLOGY  DX-SHOULDER 2+ LEFT   Final Result      Acute humeral head fracture without significant displacement.        The radiologist's interpretation of all radiological studies have been reviewed by me.    COURSE & MEDICAL DECISION MAKING  Pertinent Labs & Imaging studies reviewed. (See chart for details)    11:25 PM -patient not in the room when I go to assess. I am told she is in XRay.    2335 - Patient seen and examined at bedside. Patient will be treated with Percocet and Zofran. Ordered x-ray imaging of the shoulder to evaluate her symptoms. The differential diagnoses include but are not limited to: Humerus fracture, dislocation    2350: Patient updated with x-ray result, sling applied.  2+ radial pulse noted and intact neurovascularly otherwise with regard to the left hand after sling placement.    Patient Vitals for the past 24 hrs:   BP Temp Temp src Pulse Resp SpO2 Height Weight   08/28/22 0014 (!) 147/76 36.6 °C (97.9 °F) Temporal 78 16 96 % -- --   08/27/22 2135 (!) 145/86 37.1 °C (98.7 °F) Temporal 86 16 95 % 1.549 m (5' 1\") 51.7 kg (113 lb 15.7 oz)      HTN/IDDM FOLLOW UP:  The patient is referred to a primary physician for blood pressure management, diabetic screening, and for all other preventive health concerns     Decision Making:  This is a 77 y.o. year old female who presents with mechanical ground-level fall and injury to the left shoulder.  No definitive head injury, no headache, no abrasions or scalp hematomas noted.  She did not lose consciousness and did not vomit and has otherwise unremarkable neurologic exam.  Discussed with patient and spouse through shared decision making CT of brain was the held at this time.  X-ray confirms suspicion of humerus fracture.  Given not significantly displaced and given neurovascular tact there is no indication for emergent operative intervention at this time.  Patient and spouse " comfortable with outpatient follow-up.    I reviewed prescription monitoring program for patient's narcotic use before prescribing a scheduled drug.The patient will not drink alcohol nor drive with prescribed medications      In prescribing controlled substances to this patient, I certify that I have obtained and reviewed the medical history this patient I have also made a good kathy effort to obtain applicable records from other providers who have treated the patient and records did not demonstrate any increased risk of substance abuse that would prevent me from prescribing controlled substances.     I have conducted a physical exam and documented it. I have reviewed Ms. Diaz’s prescription history as maintained by the [place] Prescription Monitoring Program.     I have assessed the patient’s risk for abuse, dependency, and addiction using the validated Opioid Risk Tool available at https://www.mdcalc.com/kquvjc-icvf-vezo-ort-narcotic-abuse.     Given the above, I believe the benefits of controlled substance therapy outweigh the risks. The reasons for prescribing controlled substances include in my professional opinion, controlled substances are a reasonable choice for this patient. Accordingly, I have discussed the risk and benefits, treatment plan, and alternative therapies with the patient. The patient has been consented for the medication and understands the risks.     I reviewed prescription monitoring program for patient's narcotic use before prescribing a scheduled drug.The patient will not drink alcohol nor drive with prescribed medications. The patient will return for new or worsening symptoms and is stable at the time of discharge.    Patient has had high blood pressure while in the emergency department, felt likely secondary to medical condition. Counseled patient to monitor blood pressure at home and follow up with primary care physician.      DISPOSITION:  Patient will be discharged home in stable  condition.    FOLLOW UP:  Eldon Ross M.D.  [address]  [facility] [id number] [phone]    Schedule an appointment as soon as possible for a visit       Tian Alexandre M.D.  [address] [facility]  [address]  [place] [id number] [phone]    Schedule an appointment as soon as possible for a visit       OUTPATIENT MEDICATIONS:  Discharge Medication List as of 8/28/2022 12:19 AM        START taking these medications    Details   oxyCODONE-acetaminophen (PERCOCET) 5-325 MG Tab Take 1 Tablet by mouth every four hours as needed for Severe Pain for up to 7 days., Disp-28 Tablet, R-0, Normal      ondansetron (ZOFRAN ODT) 4 MG TABLET DISPERSIBLE Take 1 Tablet by mouth every 6 hours as needed for Nausea., Disp-10 Tablet, R-0, Normal                FINAL IMPRESSION  1. Humeral head fracture, left, closed, initial encounter          IElsy M.D. (Scribe), am scribing for, and in the presence of, Elsy Castillo MD.    Electronically signed by: Elsy Castillo M.D. (Scribe), 8/27/2022    IElsy MD personally performed the services described in this documentation, as scribed by Elsy Castillo M.D. in my presence, and it is both accurate and complete    The note accurately reflects work and decisions made by me.  Elsy Castillo M.D.  8/28/2022  12:29 AM

## 2022-09-29 ENCOUNTER — NON-PROVIDER VISIT (OUTPATIENT)
Dept: INTERNAL MEDICINE | Facility: IMAGING CENTER | Age: 77
End: 2022-09-29
Payer: MEDICARE

## 2022-09-29 DIAGNOSIS — Z23 NEED FOR INFLUENZA VACCINATION: ICD-10-CM

## 2022-09-29 PROCEDURE — G0008 ADMIN INFLUENZA VIRUS VAC: HCPCS | Performed by: INTERNAL MEDICINE

## 2022-09-29 PROCEDURE — 90662 IIV NO PRSV INCREASED AG IM: CPT | Performed by: INTERNAL MEDICINE

## 2022-10-17 DIAGNOSIS — Z12.31 ENCOUNTER FOR SCREENING MAMMOGRAM FOR MALIGNANT NEOPLASM OF BREAST: ICD-10-CM

## 2022-11-09 ENCOUNTER — PATIENT MESSAGE (OUTPATIENT)
Dept: HEALTH INFORMATION MANAGEMENT | Facility: OTHER | Age: 77
End: 2022-11-09

## 2022-11-14 DIAGNOSIS — E03.9 HYPOTHYROIDISM, UNSPECIFIED TYPE: ICD-10-CM

## 2022-11-14 RX ORDER — LEVOTHYROXINE SODIUM 50 MCG
TABLET ORAL
Qty: 90 TABLET | Refills: 0 | Status: SHIPPED | OUTPATIENT
Start: 2022-11-14 | End: 2023-01-13

## 2022-11-16 ENCOUNTER — APPOINTMENT (RX ONLY)
Dept: URBAN - METROPOLITAN AREA CLINIC 4 | Facility: CLINIC | Age: 77
Setting detail: DERMATOLOGY
End: 2022-11-16

## 2022-11-16 DIAGNOSIS — L44.8 OTHER SPECIFIED PAPULOSQUAMOUS DISORDERS: ICD-10-CM

## 2022-11-16 DIAGNOSIS — L81.4 OTHER MELANIN HYPERPIGMENTATION: ICD-10-CM

## 2022-11-16 DIAGNOSIS — L57.8 OTHER SKIN CHANGES DUE TO CHRONIC EXPOSURE TO NONIONIZING RADIATION: ICD-10-CM

## 2022-11-16 DIAGNOSIS — D18.0 HEMANGIOMA: ICD-10-CM

## 2022-11-16 DIAGNOSIS — Z85.828 PERSONAL HISTORY OF OTHER MALIGNANT NEOPLASM OF SKIN: ICD-10-CM

## 2022-11-16 DIAGNOSIS — D22 MELANOCYTIC NEVI: ICD-10-CM

## 2022-11-16 DIAGNOSIS — L21.8 OTHER SEBORRHEIC DERMATITIS: ICD-10-CM

## 2022-11-16 DIAGNOSIS — L82.1 OTHER SEBORRHEIC KERATOSIS: ICD-10-CM

## 2022-11-16 PROBLEM — D22.5 MELANOCYTIC NEVI OF TRUNK: Status: ACTIVE | Noted: 2022-11-16

## 2022-11-16 PROBLEM — D18.01 HEMANGIOMA OF SKIN AND SUBCUTANEOUS TISSUE: Status: ACTIVE | Noted: 2022-11-16

## 2022-11-16 PROCEDURE — 99204 OFFICE O/P NEW MOD 45 MIN: CPT

## 2022-11-16 PROCEDURE — ? PRESCRIPTION

## 2022-11-16 PROCEDURE — ? COUNSELING

## 2022-11-16 RX ADMIN — FLUOCINONIDE 1: 0.5 SOLUTION TOPICAL at 00:00

## 2022-11-16 ASSESSMENT — LOCATION SIMPLE DESCRIPTION DERM
LOCATION SIMPLE: RIGHT UPPER BACK
LOCATION SIMPLE: LEFT UPPER BACK
LOCATION SIMPLE: LEFT FOREARM
LOCATION SIMPLE: LEFT UPPER ARM
LOCATION SIMPLE: RIGHT FOREARM
LOCATION SIMPLE: RIGHT PRETIBIAL REGION
LOCATION SIMPLE: LEFT PRETIBIAL REGION

## 2022-11-16 ASSESSMENT — LOCATION DETAILED DESCRIPTION DERM
LOCATION DETAILED: RIGHT MEDIAL UPPER BACK
LOCATION DETAILED: LEFT PROXIMAL PRETIBIAL REGION
LOCATION DETAILED: LEFT MID-UPPER BACK
LOCATION DETAILED: LEFT ANTERIOR DISTAL UPPER ARM
LOCATION DETAILED: LEFT VENTRAL PROXIMAL FOREARM
LOCATION DETAILED: RIGHT DISTAL DORSAL FOREARM
LOCATION DETAILED: RIGHT PROXIMAL PRETIBIAL REGION
LOCATION DETAILED: LEFT MEDIAL UPPER BACK
LOCATION DETAILED: LEFT PROXIMAL DORSAL FOREARM
LOCATION DETAILED: LEFT SUPERIOR MEDIAL UPPER BACK

## 2022-11-16 ASSESSMENT — LOCATION ZONE DERM
LOCATION ZONE: LEG
LOCATION ZONE: ARM
LOCATION ZONE: TRUNK

## 2022-11-16 NOTE — PROCEDURE: COUNSELING
Detail Level: Simple
Detail Level: Generalized
Detail Level: Detailed
Detail Level: Zone
Moisturizer Recommendations: Amlactin

## 2022-11-17 RX ORDER — FLUOCINONIDE 0.5 MG/ML
1 SOLUTION TOPICAL TWICE DAILY
Qty: 20 | Refills: 6 | Status: ERX | COMMUNITY
Start: 2022-11-16

## 2022-11-28 ENCOUNTER — NON-PROVIDER VISIT (OUTPATIENT)
Dept: INTERNAL MEDICINE | Facility: IMAGING CENTER | Age: 77
End: 2022-11-28
Payer: MEDICARE

## 2022-11-28 DIAGNOSIS — J02.9 SORE THROAT: ICD-10-CM

## 2022-11-28 LAB
INT CON NEG: NEGATIVE
INT CON POS: POSITIVE
S PYO AG THROAT QL: NEGATIVE

## 2022-11-28 PROCEDURE — 87880 STREP A ASSAY W/OPTIC: CPT | Performed by: INTERNAL MEDICINE

## 2022-12-08 ENCOUNTER — OFFICE VISIT (OUTPATIENT)
Dept: INTERNAL MEDICINE | Facility: IMAGING CENTER | Age: 77
End: 2022-12-08
Payer: MEDICARE

## 2022-12-08 VITALS
OXYGEN SATURATION: 94 % | SYSTOLIC BLOOD PRESSURE: 110 MMHG | TEMPERATURE: 97.3 F | RESPIRATION RATE: 12 BRPM | HEART RATE: 92 BPM | WEIGHT: 113 LBS | DIASTOLIC BLOOD PRESSURE: 78 MMHG | BODY MASS INDEX: 21.35 KG/M2

## 2022-12-08 DIAGNOSIS — F41.8 SITUATIONAL ANXIETY: ICD-10-CM

## 2022-12-08 DIAGNOSIS — D69.6 THROMBOCYTOPENIA, UNSPECIFIED (HCC): ICD-10-CM

## 2022-12-08 DIAGNOSIS — M81.0 AGE-RELATED OSTEOPOROSIS WITHOUT CURRENT PATHOLOGICAL FRACTURE: ICD-10-CM

## 2022-12-08 DIAGNOSIS — G20.A1 PARKINSON'S DISEASE (HCC): ICD-10-CM

## 2022-12-08 DIAGNOSIS — E03.9 HYPOTHYROIDISM, UNSPECIFIED TYPE: ICD-10-CM

## 2022-12-08 PROBLEM — S42.209A CLOSED FRACTURE OF UPPER END OF HUMERUS: Status: ACTIVE | Noted: 2022-10-20

## 2022-12-08 PROCEDURE — 99214 OFFICE O/P EST MOD 30 MIN: CPT | Performed by: INTERNAL MEDICINE

## 2022-12-08 ASSESSMENT — FIBROSIS 4 INDEX: FIB4 SCORE: 3.01

## 2022-12-09 NOTE — PROGRESS NOTES
"Chief Complaint   Patient presents with    Neurological Problem     Parkinson's.  Recently started on medications by neurology       HISTORY OF THE PRESENT ILLNESS: Patient is a 77 y.o. female.     Patient comes in with her  to discuss chronic issues and review medication changes.    1. Parkinson's disease (HCC)  Patient diagnosed with Parkinson's by neurology.  She has been started on carbidopa levodopa.  She was recently on pramipexole but did not find it effective.    2. Hypothyroidism, unspecified type  Clinically euthyroid.  Compliant with thyroid supplementation.    3. Age-related osteoporosis without current pathological fracture  Stable.  She is on Prolia every 6 months    4. Situational anxiety  Stable.  She is doing better about going out but is still apprehensive about social situations.  She states that she is \"feeling happier\".  Her affect is much brighter today    5. Thrombocytopenia, unspecified (HCC)  Chronic/stable    HCC Gap Form    Diagnosis to address: D69.6 - Thrombocytopenia, unspecified (HCC)  Assessment and plan: Chronic, stable. Continue with current defined treatment plan: Continue to follow CBC 2-3 times yearly.  Refer to oncology if any significant decrease or other myelodysplastic issues. Follow-up at least annually.  Last edited 12/08/22 17:39 PST by Eldon Ross M.D.           Allergies: Augmentin [amoxicillin-pot clavulanate], Iodine, Mercury, and Other environmental    Current Outpatient Medications Ordered in Epic   Medication Sig Dispense Refill    carbidopa-levodopa (SINEMET)  MG Tab TAKE 1/2 (ONE-HALF) TABLET BY MOUTH TWICE DAILY FOR 1 WEEK THEN 1 TABLET TWICE DAILY      SYNTHROID 50 MCG Tab Take 1 tablet by mouth once daily 90 Tablet 0    ondansetron (ZOFRAN ODT) 4 MG TABLET DISPERSIBLE Take 1 Tablet by mouth every 6 hours as needed for Nausea. 10 Tablet 0    venlafaxine XR (EFFEXOR XR) 75 MG CAPSULE SR 24 HR Take 2 Capsules by mouth every day. 180 Capsule 0    " denosumab (PROLIA) 60 MG/ML Solution Prefilled Syringe injection Inject 1 mL under the skin every 6 months. 1 mL 0    Mouthwashes (BIOTENE DRY MOUTH) Liquid Spray  in mouth/throat 4 times a day.      Cholecalciferol (VITAMIN D3) 2000 UNIT Cap Take 2,000 Units by mouth every day.       No current [facility]-ordered facility-administered medications on file.       Past medical history, social history and family history were reviewed from chart today    Review of systems: Per HPI.    All others negative.     Exam: /78 (BP Location: Left arm, Patient Position: Sitting, BP Cuff Size: Adult)   Pulse 92   Temp 36.3 °C (97.3 °F) (Temporal)   Resp 12   Wt 51.3 kg (113 lb)   SpO2 94%   General: Well-appearing. Well-developed. No signs of distress.  HEENT: Grossly normal. Oral cavity is pink and moist.   Neck: Supple without JVD or bruit.  Pulmonary: Clear with good breath sounds. Normal effort.  Cardiovascular: Regular. Carotid and radial pulses are intact.  Abdomen: Soft, nontender, nondistended. Spleen and liver are not enlarged.  Neurologic: Resting tremor in the right arm.  Better volume of voice.  Decreased facial expressions.      Diagnosis:  1. Thrombocytopenia, unspecified (HCC)        2. Parkinson's disease (HCC)        3. Hypothyroidism, unspecified type        4. Age-related osteoporosis without current pathological fracture        5. Situational anxiety          Overall patient is stable/improved.  Glad to see that she is on treatment for Parkinson's.  We discussed that it will take time to find the appropriate dose of medication to help with her tremor.  She still has decreased facial expressions but her voice volume is improved.    Chronic thrombocytopenia.  No work-up but we will continue to monitor.      My total time spent caring for the patient on the day of the encounter was  greater than 30 minutes.   This includes obtaining history, reviewing chart, physical exam, patient education, reviewing outside  records, placing orders, interpreting tests and coordinating care.

## 2022-12-12 RX ORDER — VENLAFAXINE HYDROCHLORIDE 75 MG/1
CAPSULE, EXTENDED RELEASE ORAL
Qty: 180 CAPSULE | Refills: 1 | Status: SHIPPED | OUTPATIENT
Start: 2022-12-12 | End: 2023-02-27 | Stop reason: SDUPTHER

## 2022-12-13 ENCOUNTER — NON-PROVIDER VISIT (OUTPATIENT)
Dept: INTERNAL MEDICINE | Facility: IMAGING CENTER | Age: 77
End: 2022-12-13
Payer: MEDICARE

## 2022-12-13 ENCOUNTER — HOSPITAL ENCOUNTER (OUTPATIENT)
Facility: MEDICAL CENTER | Age: 77
End: 2022-12-13
Attending: INTERNAL MEDICINE
Payer: MEDICARE

## 2022-12-13 DIAGNOSIS — D69.6 THROMBOCYTOPENIA, UNSPECIFIED (HCC): ICD-10-CM

## 2022-12-13 DIAGNOSIS — R35.0 URINE FREQUENCY: ICD-10-CM

## 2022-12-13 DIAGNOSIS — E78.00 PURE HYPERCHOLESTEROLEMIA: Chronic | ICD-10-CM

## 2022-12-13 DIAGNOSIS — G20.A1 PARKINSON'S DISEASE (HCC): ICD-10-CM

## 2022-12-13 DIAGNOSIS — M81.0 AGE-RELATED OSTEOPOROSIS WITHOUT CURRENT PATHOLOGICAL FRACTURE: ICD-10-CM

## 2022-12-13 DIAGNOSIS — M81.0 OSTEOPOROSIS OF LOWER LEG WITHOUT PATHOLOGICAL FRACTURE: ICD-10-CM

## 2022-12-13 DIAGNOSIS — E03.9 ACQUIRED HYPOTHYROIDISM: Chronic | ICD-10-CM

## 2022-12-13 LAB
25(OH)D3 SERPL-MCNC: 53 NG/ML (ref 30–100)
ALBUMIN SERPL BCP-MCNC: 4.6 G/DL (ref 3.2–4.9)
ALBUMIN/GLOB SERPL: 1.7 G/DL
ALP SERPL-CCNC: 57 U/L (ref 30–99)
ALT SERPL-CCNC: 12 U/L (ref 2–50)
ANION GAP SERPL CALC-SCNC: 7 MMOL/L (ref 7–16)
APPEARANCE UR: CLEAR
AST SERPL-CCNC: 23 U/L (ref 12–45)
BASOPHILS # BLD AUTO: 1 % (ref 0–1.8)
BASOPHILS # BLD: 0.05 K/UL (ref 0–0.12)
BILIRUB SERPL-MCNC: 0.5 MG/DL (ref 0.1–1.5)
BILIRUB UR QL STRIP.AUTO: NEGATIVE
BUN SERPL-MCNC: 17 MG/DL (ref 8–22)
CALCIUM ALBUM COR SERPL-MCNC: 8.7 MG/DL (ref 8.5–10.5)
CALCIUM SERPL-MCNC: 9.2 MG/DL (ref 8.5–10.5)
CHLORIDE SERPL-SCNC: 103 MMOL/L (ref 96–112)
CHOLEST SERPL-MCNC: 226 MG/DL (ref 100–199)
CO2 SERPL-SCNC: 29 MMOL/L (ref 20–33)
COLOR UR: YELLOW
CREAT SERPL-MCNC: 0.71 MG/DL (ref 0.5–1.4)
EOSINOPHIL # BLD AUTO: 0.12 K/UL (ref 0–0.51)
EOSINOPHIL NFR BLD: 2.3 % (ref 0–6.9)
ERYTHROCYTE [DISTWIDTH] IN BLOOD BY AUTOMATED COUNT: 43.2 FL (ref 35.9–50)
GFR SERPLBLD CREATININE-BSD FMLA CKD-EPI: 88 ML/MIN/1.73 M 2
GLOBULIN SER CALC-MCNC: 2.7 G/DL (ref 1.9–3.5)
GLUCOSE SERPL-MCNC: 91 MG/DL (ref 65–99)
GLUCOSE UR STRIP.AUTO-MCNC: NEGATIVE MG/DL
HCT VFR BLD AUTO: 44.6 % (ref 37–47)
HDLC SERPL-MCNC: 58 MG/DL
HGB BLD-MCNC: 14.7 G/DL (ref 12–16)
IMM GRANULOCYTES # BLD AUTO: 0.01 K/UL (ref 0–0.11)
IMM GRANULOCYTES NFR BLD AUTO: 0.2 % (ref 0–0.9)
KETONES UR STRIP.AUTO-MCNC: NEGATIVE MG/DL
LDLC SERPL CALC-MCNC: 147 MG/DL
LEUKOCYTE ESTERASE UR QL STRIP.AUTO: NEGATIVE
LYMPHOCYTES # BLD AUTO: 1.94 K/UL (ref 1–4.8)
LYMPHOCYTES NFR BLD: 37.7 % (ref 22–41)
MCH RBC QN AUTO: 30.1 PG (ref 27–33)
MCHC RBC AUTO-ENTMCNC: 33 G/DL (ref 33.6–35)
MCV RBC AUTO: 91.4 FL (ref 81.4–97.8)
MICRO URNS: NORMAL
MONOCYTES # BLD AUTO: 0.43 K/UL (ref 0–0.85)
MONOCYTES NFR BLD AUTO: 8.3 % (ref 0–13.4)
NEUTROPHILS # BLD AUTO: 2.6 K/UL (ref 2–7.15)
NEUTROPHILS NFR BLD: 50.5 % (ref 44–72)
NITRITE UR QL STRIP.AUTO: NEGATIVE
NRBC # BLD AUTO: 0 K/UL
NRBC BLD-RTO: 0 /100 WBC
PH UR STRIP.AUTO: 6 [PH] (ref 5–8)
PLATELET # BLD AUTO: 157 K/UL (ref 164–446)
PMV BLD AUTO: 11.9 FL (ref 9–12.9)
POTASSIUM SERPL-SCNC: 4 MMOL/L (ref 3.6–5.5)
PROT SERPL-MCNC: 7.3 G/DL (ref 6–8.2)
PROT UR QL STRIP: NEGATIVE MG/DL
RBC # BLD AUTO: 4.88 M/UL (ref 4.2–5.4)
RBC UR QL AUTO: NEGATIVE
SODIUM SERPL-SCNC: 139 MMOL/L (ref 135–145)
SP GR UR STRIP.AUTO: 1
T4 FREE SERPL-MCNC: 1.42 NG/DL (ref 0.93–1.7)
TRIGL SERPL-MCNC: 105 MG/DL (ref 0–149)
TSH SERPL DL<=0.005 MIU/L-ACNC: 2.07 UIU/ML (ref 0.38–5.33)
UROBILINOGEN UR STRIP.AUTO-MCNC: 0.2 MG/DL
WBC # BLD AUTO: 5.2 K/UL (ref 4.8–10.8)

## 2022-12-13 PROCEDURE — 84439 ASSAY OF FREE THYROXINE: CPT

## 2022-12-13 PROCEDURE — 82523 COLLAGEN CROSSLINKS: CPT

## 2022-12-13 PROCEDURE — 84443 ASSAY THYROID STIM HORMONE: CPT

## 2022-12-13 PROCEDURE — 80061 LIPID PANEL: CPT

## 2022-12-13 PROCEDURE — 85025 COMPLETE CBC W/AUTO DIFF WBC: CPT

## 2022-12-13 PROCEDURE — 81003 URINALYSIS AUTO W/O SCOPE: CPT

## 2022-12-13 PROCEDURE — 83519 RIA NONANTIBODY: CPT

## 2022-12-13 PROCEDURE — 80053 COMPREHEN METABOLIC PANEL: CPT

## 2022-12-13 PROCEDURE — 82306 VITAMIN D 25 HYDROXY: CPT

## 2022-12-15 LAB
COLLAGEN CTX SERPL-MCNC: 40 PG/ML
TEST NAME 95000: NORMAL

## 2022-12-22 ENCOUNTER — OUTPATIENT INFUSION SERVICES (OUTPATIENT)
Dept: ONCOLOGY | Facility: MEDICAL CENTER | Age: 77
End: 2022-12-22
Attending: INTERNAL MEDICINE
Payer: MEDICARE

## 2022-12-22 VITALS
SYSTOLIC BLOOD PRESSURE: 146 MMHG | RESPIRATION RATE: 16 BRPM | DIASTOLIC BLOOD PRESSURE: 76 MMHG | HEIGHT: 61 IN | WEIGHT: 115.3 LBS | TEMPERATURE: 97.2 F | OXYGEN SATURATION: 97 % | BODY MASS INDEX: 21.77 KG/M2 | HEART RATE: 73 BPM

## 2022-12-22 DIAGNOSIS — M81.0 AGE-RELATED OSTEOPOROSIS WITHOUT CURRENT PATHOLOGICAL FRACTURE: ICD-10-CM

## 2022-12-22 PROCEDURE — 700111 HCHG RX REV CODE 636 W/ 250 OVERRIDE (IP): Mod: JG | Performed by: INTERNAL MEDICINE

## 2022-12-22 PROCEDURE — 96372 THER/PROPH/DIAG INJ SC/IM: CPT

## 2022-12-22 RX ORDER — METHYLPREDNISOLONE 4 MG/1
TABLET ORAL
COMMUNITY
Start: 2022-12-11 | End: 2023-04-05

## 2022-12-22 RX ADMIN — DENOSUMAB 60 MG: 60 INJECTION SUBCUTANEOUS at 14:58

## 2022-12-22 ASSESSMENT — FIBROSIS 4 INDEX: FIB4 SCORE: 3.21

## 2022-12-22 NOTE — PROGRESS NOTES
Kimym arrives to [facility] for q 6 month Prolia for osteoporosis. Patient reports tolerating injections well without side effects. Denies s/s hypocalcemia; denies s/s active infections; denies recent/upcoming invasive dental procedures. Patient is only taking supplementary PO Vitamin D at this time, per provider. Labs drawn on 12/13/22 and are within treatable parameters. Prolia administered SQ to back of right arm without issues. Emailed scheduling regarding next appt. Discharged home to self care in no apparent distress.

## 2023-01-13 DIAGNOSIS — E03.9 HYPOTHYROIDISM, UNSPECIFIED TYPE: ICD-10-CM

## 2023-01-13 RX ORDER — LEVOTHYROXINE SODIUM 50 MCG
TABLET ORAL
Qty: 90 TABLET | Refills: 3 | Status: SHIPPED | OUTPATIENT
Start: 2023-01-13 | End: 2024-01-15

## 2023-02-27 RX ORDER — VENLAFAXINE HYDROCHLORIDE 150 MG/1
150 CAPSULE, EXTENDED RELEASE ORAL DAILY
Qty: 90 CAPSULE | Refills: 3 | Status: SHIPPED | OUTPATIENT
Start: 2023-02-27 | End: 2023-05-22 | Stop reason: SDUPTHER

## 2023-03-14 ENCOUNTER — OFFICE VISIT (OUTPATIENT)
Dept: INTERNAL MEDICINE | Facility: IMAGING CENTER | Age: 78
End: 2023-03-14
Payer: MEDICARE

## 2023-03-14 VITALS
SYSTOLIC BLOOD PRESSURE: 124 MMHG | HEART RATE: 72 BPM | BODY MASS INDEX: 21.9 KG/M2 | TEMPERATURE: 98.2 F | WEIGHT: 116 LBS | OXYGEN SATURATION: 95 % | DIASTOLIC BLOOD PRESSURE: 74 MMHG | HEIGHT: 61 IN | RESPIRATION RATE: 12 BRPM

## 2023-03-14 DIAGNOSIS — F41.8 SITUATIONAL ANXIETY: ICD-10-CM

## 2023-03-14 DIAGNOSIS — G20.A1 PARKINSON'S DISEASE (HCC): ICD-10-CM

## 2023-03-14 DIAGNOSIS — M48.062 SPINAL STENOSIS OF LUMBAR REGION WITH NEUROGENIC CLAUDICATION: ICD-10-CM

## 2023-03-14 DIAGNOSIS — M54.2 NECK PAIN: ICD-10-CM

## 2023-03-14 DIAGNOSIS — M81.0 AGE-RELATED OSTEOPOROSIS WITHOUT CURRENT PATHOLOGICAL FRACTURE: ICD-10-CM

## 2023-03-14 DIAGNOSIS — Z00.00 MEDICARE ANNUAL WELLNESS VISIT, SUBSEQUENT: ICD-10-CM

## 2023-03-14 DIAGNOSIS — D69.6 THROMBOCYTOPENIA, UNSPECIFIED (HCC): ICD-10-CM

## 2023-03-14 DIAGNOSIS — E78.00 PURE HYPERCHOLESTEROLEMIA: ICD-10-CM

## 2023-03-14 DIAGNOSIS — E03.9 HYPOTHYROIDISM, UNSPECIFIED TYPE: ICD-10-CM

## 2023-03-14 PROBLEM — M48.061 SPINAL STENOSIS OF LUMBAR REGION: Status: ACTIVE | Noted: 2022-09-21

## 2023-03-14 PROCEDURE — G0439 PPPS, SUBSEQ VISIT: HCPCS | Performed by: INTERNAL MEDICINE

## 2023-03-14 ASSESSMENT — ENCOUNTER SYMPTOMS: GENERAL WELL-BEING: GOOD

## 2023-03-14 ASSESSMENT — ACTIVITIES OF DAILY LIVING (ADL): BATHING_REQUIRES_ASSISTANCE: 0

## 2023-03-14 ASSESSMENT — FIBROSIS 4 INDEX: FIB4 SCORE: 3.26

## 2023-03-14 ASSESSMENT — PATIENT HEALTH QUESTIONNAIRE - PHQ9: CLINICAL INTERPRETATION OF PHQ2 SCORE: 0

## 2023-03-14 NOTE — PROGRESS NOTES
78 y.o. female presents for the followin. Medicare annual wellness visit, subsequent  Patient comes in for annual health risk assessment, physical and review of laboratory.  She considers herself in good health.  No depression.  No balance issues.  She has noticed some difficulty with word recall but no significant cognitive impairment.    2. Hypothyroidism, unspecified type  Stable.  Clinically euthyroid.  Thyroid function tests are normal    3. Age-related osteoporosis without current pathological fracture  Stable/improved.  Patient is on Prolia.  Bone turnover markers are low as expected.    4. Parkinson's disease (HCC)  Stable/untreated.  Patient is followed by neurology.  She has tried multiple medications for treatment of Parkinson's with no benefit or significant side effects.  Her tremor is primarily on the right side.  She does not feel that it is adversely affecting her quality of life.  No falls.  She has experienced some mild swallowing issues.  She also suffers from some constipation.  She also notes low amplitude voice.    5. Pure hypercholesterolemia  Stable.  Cholesterol is above goal.  She had cardiac CT in 2019 which showed 0 plaque.    6. Thrombocytopenia, unspecified (HCC)  Stable.  Platelets are low normal.  Her last level was 157,000.    7. Situational anxiety  Improved.  Patient is on Effexor.  The dosing is somewhat unclear.  She is likely on 150 mg extended release daily.  She believes that she is currently on 75 mg 2 tablets once daily.  She had a prescription written for 150 mg in February but it is unclear if this was picked up.  It is possible that she could be taking two 150 mg tablets once daily.  She is more social.  She is able to be in social situations without feeling that she needs to go to bed.    8. Spinal stenosis of lumbar region with neurogenic claudication  Stable.  Followed by Dr. Reyes.  She has radicular symptoms on the left, they are primarily in the sciatic  distribution.    9. Neck pain  Ongoing.  No work-up.  Symptoms seem related to fall that occurred last year.  She had no imaging or further work-up.  No neurologic changes      Annual Wellness Visit/Health Risk Assessment:    Past medical:  Past Medical History:   Diagnosis Date    Acquired hypothyroidism 01/24/2019    Actinic keratoses 01/24/2019    Allergic rhinitis     Anxiety     Cancer (HCC)     Skin    CATARACT     Bilateral IOL    Gastroesophageal reflux disease with esophagitis 01/24/2019    Hemochromatosis     carrier    History of squamous cell carcinoma 01/24/2019    Hyperlipidemia     Osteoporosis     Other specified symptom associated with female genital organs     prolapse    Parkinson's disease (tremor, stiffness, slow motion, unstable posture) (HCC)     Pure hypercholesterolemia 05/23/2019    Urinary bladder disorder     prolapse    Vaginal prolapse 01/24/2019    Vitamin D deficiency        Past surgical:  Past Surgical History:   Procedure Laterality Date    PB SHLDR ARTHROSCOP,SURG,W/ROTAT CUFF REPB Right 11/4/2020    Procedure: ARTHROSCOPY, SHOULDER, WITH ROTATOR CUFF REPAIR;  Surgeon: Svetlana Calabrese M.D.;  Location: SURGERY [facility];  Service: Orthopedics    PB ARTHROSCOPY SHOULDER SURGICAL BICEPS TENODES* Right 11/4/2020    Procedure: ARTHROSCOPY, SHOULDER, WITH BICEPS TENOTOMY;  Surgeon: Svetlana Calabrese M.D.;  Location: SURGERY [facility];  Service: Orthopedics    OTHER  2018    Mohs scalp    CATARACT EXTRACTION WITH IOL Bilateral 2016    SHOULDER ARTHROSCOPY W/ ROTATOR CUFF REPAIR  7/11/2012    Performed by SVETLANA CALABRESE at SURGERY [facility] ORS    BICEPS TENDON REPAIR  7/11/2012    Performed by SVETLANA CALABRESE at SURGERY [facility] ORS    SHOULDER DECOMPRESSION  7/11/2012    Performed by SVETLANA CALABRESE at SURGERY [facility] ORS    VAGINAL HYSTERECTOMY SCOPE TOTAL  8/24/2011    Performed by QAMAR COPELAND at SURGERY SAME DAY [facility] ORS    ANTERIOR AND POSTERIOR REPAIR   8/24/2011    Performed by QAMAR COPELAND at SURGERY SAME DAY [facility]S    TUBAL LIGATION  1980    OTHER      wisdom  teeth       Family history: relating to possible risk factors for your patient  Family History   Problem Relation Age of Onset    Diabetes Other     Lung Disease Mother        Current Providers (including home care/DME’s):   Colonoscopy 6/6/13 Repeat in 10 Dexa 10/13/21  osteoporosis Mammo  12/12/22  Cat 1   [facility]     Patient Care Team:  Eldon Ross M.D. as PCP - General (Internal Medicine)  Yuliet Lucio R.N. as Registered Nurse      Medications:   Current Outpatient Medications Ordered in Epic   Medication Sig Dispense Refill    venlafaxine XR (EFFEXOR-XR) 150 MG extended-release capsule Take 1 Capsule by mouth every day. 90 Capsule 3    SYNTHROID 50 MCG Tab Take 1 tablet by mouth once daily 90 Tablet 3    methylPREDNISolone (MEDROL DOSEPAK) 4 MG Tablet Therapy Pack       ondansetron (ZOFRAN ODT) 4 MG TABLET DISPERSIBLE Take 1 Tablet by mouth every 6 hours as needed for Nausea. 10 Tablet 0    denosumab (PROLIA) 60 MG/ML Solution Prefilled Syringe injection Inject 1 mL under the skin every 6 months. 1 mL 0    Mouthwashes (BIOTENE DRY MOUTH) Liquid Spray  in mouth/throat 4 times a day.      Cholecalciferol (VITAMIN D3) 2000 UNIT Cap Take 2,000 Units by mouth every day.       No current [facility]-ordered facility-administered medications on file.       Supplements (calcium/vitamins): if not lisited in medications    Chief Complaint   Patient presents with    Medicare Annual Wellness         HPI:  Kimmy Diaz is a 78 y.o. here for Medicare Annual Wellness Visit     Patient Active Problem List    Diagnosis Date Noted    Thrombocytopenia, unspecified (HCC) 12/08/2022    Parkinson's disease (HCC) 12/08/2022    Closed fracture of upper end of humerus 10/20/2022    Spinal stenosis of lumbar region 09/21/2022    Age-related osteoporosis without current pathological fracture 11/05/2021    Pure  hypercholesterolemia 05/23/2019    Actinic keratoses 01/24/2019    History of squamous cell carcinoma 01/24/2019    Acquired hypothyroidism 01/24/2019    Vaginal prolapse 01/24/2019    Gastroesophageal reflux disease with esophagitis 01/24/2019    Osteoporosis of lower leg without pathological fracture 07/10/2011       Current Outpatient Medications   Medication Sig Dispense Refill    venlafaxine XR (EFFEXOR-XR) 150 MG extended-release capsule Take 1 Capsule by mouth every day. 90 Capsule 3    SYNTHROID 50 MCG Tab Take 1 tablet by mouth once daily 90 Tablet 3    methylPREDNISolone (MEDROL DOSEPAK) 4 MG Tablet Therapy Pack       ondansetron (ZOFRAN ODT) 4 MG TABLET DISPERSIBLE Take 1 Tablet by mouth every 6 hours as needed for Nausea. 10 Tablet 0    denosumab (PROLIA) 60 MG/ML Solution Prefilled Syringe injection Inject 1 mL under the skin every 6 months. 1 mL 0    Mouthwashes (BIOTENE DRY MOUTH) Liquid Spray  in mouth/throat 4 times a day.      Cholecalciferol (VITAMIN D3) 2000 UNIT Cap Take 2,000 Units by mouth every day.       No current facility-administered medications for this visit.            Current supplements as per medication list.       Allergies: Augmentin [amoxicillin-pot clavulanate], Iodine, Mercury, and Other environmental    Current social contact/activities:  Social with friends and family..    She  reports that she has never smoked. She has never used smokeless tobacco. She reports current alcohol use. She reports that she does not use drugs.  Counseling given: Not Answered      DPA/Advanced Directive: Completed but not available      ROS:    Gait: Uses : None  Ostomy: No  Other tubes: no   Amputations: no   Chronic oxygen use: no   Last eye exam: 1 year ago  : Denies any urinary leakage during the last 6 months incontinence.       Screening:  Colonoscopy 6/6/13 Repeat in 10 Dexa 10/13/21  osteoporosis Mammo  12/12/22  Cat 1   [facility]     Depression Screening  Little interest or pleasure in  doing things?  0 - not at all  Feeling down, depressed , or hopeless? 0 - not at all  Patient Health Questionnaire Score: 0     If depressive symptoms identified deferred to follow up visit unless specifically addressed in assessment and plan.    Interpretation of PHQ-9 Total Score   Score Severity   1-4 No Depression   5-9 Mild Depression   10-14 Moderate Depression   15-19 Moderately Severe Depression   20-27 Severe Depression    Screening for Cognitive Impairment  Three Minute Recall (daughter, heaven, mountain) 1/3    Ramesh clock face with all 12 numbers and set the hands to show 10 past 11.  No    Cognitive concerns identified deferred for follow up unless specifically addressed in assessment and plan.    Fall Risk Assessment  Has the patient had two or more falls in the last year or any fall with injury in the last year?  Yes    Safety Assessment  Throw rugs on floor.  No  Handrails on all stairs.  Yes  Good lighting in all hallways.  Yes  Difficulty hearing.  No  Patient counseled about all safety risks that were identified.    Functional Assessment ADLs  Are there any barriers preventing you from cooking for yourself or meeting nutritional needs?  No.    Are there any barriers preventing you from driving safely or obtaining transportation?  No.    Are there any barriers preventing you from using a telephone or calling for help?  No.    Are there any barriers preventing you from shopping?  No.    Are there any barriers preventing you from taking care of your own finances?  No.    Are there any barriers preventing you from managing your medications?  No.    Are there any barriers preventing you from showering, bathing or dressing yourself?  No.    Are you currently engaging in any exercise or physical activity?  No.     What is your perception of your health?  Good    Advance Care Planning  Do you have an Advance Directive, Living Will, Durable Power of , or POLST?                   Health Maintenance  Summary            Annual Wellness Visit (Every 366 Days) Next due on 3/14/2024      03/14/2023  Visit Dx: Medicare annual wellness visit, subsequent    08/13/2021  Visit Dx: Medicare annual wellness visit, subsequent    08/11/2020  Subsequent Annual Wellness Visit - Includes PPPS ()    08/11/2020  Visit Dx: Medicare annual wellness visit, subsequent    01/24/2019  Initial Annual Wellness Visit - Includes PPPS ()    Only the first 5 history entries have been loaded, but more history exists.              IMM DTaP/Tdap/Td Vaccine (2 - Td or Tdap) Next due on 8/25/2026 08/25/2016  Imm Admin: Tdap Vaccine              BONE DENSITY (Every 5 Years) Next due on 10/13/2026      10/13/2021  DS-BONE DENSITY STUDY (DEXA)    03/18/2019  DS-BONE DENSITY STUDY (DEXA)    08/24/2015  DS-BONE DENSITY STUDY (DEXA)    03/11/2013  DS-BONE DENSITY STUDY (DEXA)    02/18/2011  DS-BONE DENSITY STUDY (DEXA)              IMM PNEUMOCOCCAL VACCINE: 65+ Years (Series Information) Completed      10/04/2017  Imm Admin: Pneumococcal polysaccharide vaccine (PPSV-23)    08/25/2016  Imm Admin: Pneumococcal Conjugate Vaccine (Prevnar/PCV-13)    11/10/2011  Imm Admin: Pneumococcal polysaccharide vaccine (PPSV-23)              IMM ZOSTER VACCINES (Series Information) Completed      08/13/2019  Imm Admin: Zoster Vaccine Recombinant (RZV) (SHINGRIX)    05/05/2019  Imm Admin: Zoster Vaccine Recombinant (RZV) (SHINGRIX)              HEPATITIS C SCREENING  Completed      05/08/2020  HEP C VIRUS ANTIBODY              IMM INFLUENZA (Series Information) Completed      09/29/2022  Imm Admin: Influenza Vaccine Adult HD    10/11/2021  Imm Admin: Influenza Vaccine Adult HD    09/24/2020  Imm Admin: Influenza Vaccine Adult HD    10/02/2019  Imm Admin: Influenza Vaccine Adult HD    10/11/2018  Imm Admin: Influenza Vaccine Adult HD    Only the first 5 history entries have been loaded, but more history exists.              COVID-19 Vaccine (Series  Information) Completed      11/15/2022  Imm Admin: PFIZER BIVALENT BOOSTER SARS-COV-2 VACCINE (12+)    10/05/2021  Imm Admin: PFIZER PURPLE CAP SARS-COV-2 VACCINATION (12+)    02/05/2021  Imm Admin: PFIZER PURPLE CAP SARS-COV-2 VACCINATION (12+)    01/14/2021  Imm Admin: PFIZER PURPLE CAP SARS-COV-2 VACCINATION (12+)              IMM MENINGOCOCCAL ACWY VACCINE (Series Information) Aged Out      No completion history exists for this topic.              Discontinued - MAMMOGRAM  Discontinued        Frequency changed to Never automatically (Topic No Longer Applies)    12/12/2022  SW-[id number]-[id number]    10/06/2021  LN-[id number]-[id number]    09/10/2020  ZA-TFKEUPHNE-YVDRUSVAM    01/08/2019  MW-LDNNZWWTQ-VGXLIFREV    Only the first 5 history entries have been loaded, but more history exists.              Discontinued - COLORECTAL CANCER SCREENING  Discontinued        Frequency changed to Never automatically (Topic No Longer Applies)    06/06/2013  REFERRAL TO GI FOR COLONOSCOPY    01/09/2013  REFERRAL TO GI FOR COLONOSCOPY              Discontinued - IMM HEP B VACCINE  Discontinued      No completion history exists for this topic.                    Patient Care Team:  Eldon Ross M.D. as PCP - General (Internal Medicine)  Yuliet Lucio R.N. as Registered Nurse        Social History     Tobacco Use    Smoking status: Never    Smokeless tobacco: Never   Vaping Use    Vaping Use: Never used   Substance Use Topics    Alcohol use: Yes     Comment: once a month    Drug use: No     Family History   Problem Relation Age of Onset    Diabetes Other     Lung Disease Mother      She  has a past medical history of Acquired hypothyroidism (01/24/2019), Actinic keratoses (01/24/2019), Allergic rhinitis, Anxiety, Cancer (HCC), CATARACT, Gastroesophageal reflux disease with esophagitis (01/24/2019), Hemochromatosis, History of squamous cell carcinoma (01/24/2019), Hyperlipidemia, Osteoporosis, Other specified symptom associated  "with female genital organs, Parkinson's disease (tremor, stiffness, slow motion, unstable posture) ([facility]), Pure hypercholesterolemia (05/23/2019), Urinary bladder disorder, Vaginal prolapse (01/24/2019), and Vitamin D deficiency.   Past Surgical History:   Procedure Laterality Date    PB SHLDR ARTHROSCOP,SURG,W/ROTAT CUFF REPB Right 11/4/2020    Procedure: ARTHROSCOPY, SHOULDER, WITH ROTATOR CUFF REPAIR;  Surgeon: Svetlana Calabrese M.D.;  Location: SURGERY [facility];  Service: Orthopedics    PB ARTHROSCOPY SHOULDER SURGICAL BICEPS TENODES* Right 11/4/2020    Procedure: ARTHROSCOPY, SHOULDER, WITH BICEPS TENOTOMY;  Surgeon: Svetlana Calabrese M.D.;  Location: SURGERY [facility];  Service: Orthopedics    OTHER  2018    Mohs scalp    CATARACT EXTRACTION WITH IOL Bilateral 2016    SHOULDER ARTHROSCOPY W/ ROTATOR CUFF REPAIR  7/11/2012    Performed by SVETLANA CALABRESE at SURGERY [facility] ORS    BICEPS TENDON REPAIR  7/11/2012    Performed by SVETLANA CALABRESE at SURGERY [facility] ORS    SHOULDER DECOMPRESSION  7/11/2012    Performed by SVETLANA CALABRESE at SURGERY [facility] ORS    VAGINAL HYSTERECTOMY SCOPE TOTAL  8/24/2011    Performed by QAMAR COPELAND at SURGERY SAME DAY [facility] ORS    ANTERIOR AND POSTERIOR REPAIR  8/24/2011    Performed by QAMAR COPELAND at SURGERY SAME DAY [facility] ORS    TUBAL LIGATION  1980    OTHER      wisdom  teeth       Exam:     /74 (BP Location: Left arm, Patient Position: Sitting, BP Cuff Size: Adult)   Pulse 72   Temp 36.8 °C (98.2 °F) (Temporal)   Resp 12   Ht 1.549 m (5' 1\")   Wt 52.6 kg (116 lb)   SpO2 95%  Body mass index is 21.92 kg/m².    Hearing good.    Dentition good  Alert, oriented in no acute distress.  Eye contact is good, speech goal directed, affect calm  General: Well-appearing and in no distress.  HEENT: Grossly normal. Oral cavity is pink and moist.  Neck: Supple without JVD or bruit.  Pulmonary: Clear with good breath sounds. Normal " effort.  Cardiovascular: Regular. Carotid and radial pulses are intact.  Abdomen: Soft, nontender, nondistended.  Neurologic: Grossly nonfocal.  Resting tremor in the right arm, hand and foot.    Assessment and Plan. The following treatment and monitoring plan is recommended:    1. Medicare annual wellness visit, subsequent        2. Hypothyroidism, unspecified type        3. Age-related osteoporosis without current pathological fracture        4. Parkinson's disease (HCC)        5. Pure hypercholesterolemia        6. Thrombocytopenia, unspecified (HCC)        7. Situational anxiety        8. Spinal stenosis of lumbar region with neurogenic claudication        9. Neck pain  DX-CERVICAL SPINE-WITH FLEX-EXT   7+          78-year-old female.  Overall I think she is doing better.    Thyroid issues are stable.  No change in treatment.  Osteoporosis appears to be improved based on bone turnover markers.  She is now on Prolia.  Patient with tremor.  She has been diagnosed with Parkinson's disease.  She has multiple classic findings.  She has not responded to dopaminergic medications.  She will follow-up with Dr. Cali for this.  Cholesterol is above goal but cardiac CT showed no hard plaque.  We will continue to monitor.  She is not interested in statin therapy.  Thrombocytopenia stable.  Anxiety is improved.  Recommend that she continue on her current dose of Effexor.  If she is on 150 mg (which is what I believe) then we will continue but if she is on 300 mg (150 mg x 2) then we will continue this as she appears to be doing better.  We had a discussion were regarding Klonopin.  She did not respond to 0.25 mg.  I recommended trying 0.5 mg if needed and if no effect increase to the 1 mg.  Imaging of the neck as above.  Begin work-up for neck pain.    Services suggested: No services required at this time  Health Care Screening: Age-appropriate preventive services Medicare covers discussed today and ordered if  indicated.  Referrals offered: Community-based lifestyle interventions to reduce health risks and promote self-management and wellness, fall prevention, nutrition, physical activity, tobacco-use cessation, weight loss, and mental health services as per orders if indicated.    Discussion today about general wellness and lifestyle habits:    Prevent falls and reduce trip hazards; Cautioned about securing or removing rugs.  Have a working fire alarm and carbon monoxide detector;   Engage in regular physical activity and social activities       Follow-up: 1 year for HRA.  3 months for routine follow-up

## 2023-03-17 DIAGNOSIS — Z12.11 SCREENING FOR COLON CANCER: ICD-10-CM

## 2023-04-11 ENCOUNTER — APPOINTMENT (OUTPATIENT)
Dept: ADMISSIONS | Facility: MEDICAL CENTER | Age: 78
End: 2023-04-11
Attending: NEUROLOGICAL SURGERY
Payer: MEDICARE

## 2023-04-14 ENCOUNTER — PRE-ADMISSION TESTING (OUTPATIENT)
Dept: ADMISSIONS | Facility: MEDICAL CENTER | Age: 78
End: 2023-04-14
Attending: NEUROLOGICAL SURGERY
Payer: MEDICARE

## 2023-04-14 VITALS — BODY MASS INDEX: 21.71 KG/M2 | HEIGHT: 61 IN | WEIGHT: 115 LBS

## 2023-04-14 RX ORDER — FLUTICASONE PROPIONATE 50 MCG
1 SPRAY, SUSPENSION (ML) NASAL DAILY
COMMUNITY

## 2023-04-14 RX ORDER — LORATADINE 10 MG/1
10 TABLET ORAL DAILY
COMMUNITY

## 2023-04-14 ASSESSMENT — FIBROSIS 4 INDEX: FIB4 SCORE: 3.26

## 2023-04-19 ENCOUNTER — PRE-ADMISSION TESTING (OUTPATIENT)
Dept: ADMISSIONS | Facility: MEDICAL CENTER | Age: 78
End: 2023-04-19
Attending: NEUROLOGICAL SURGERY
Payer: MEDICARE

## 2023-04-19 ENCOUNTER — HOSPITAL ENCOUNTER (OUTPATIENT)
Dept: RADIOLOGY | Facility: MEDICAL CENTER | Age: 78
End: 2023-04-19
Attending: NEUROLOGICAL SURGERY | Admitting: NEUROLOGICAL SURGERY
Payer: MEDICARE

## 2023-04-19 DIAGNOSIS — Z01.812 PRE-OPERATIVE LABORATORY EXAMINATION: ICD-10-CM

## 2023-04-19 DIAGNOSIS — Z01.810 PRE-OPERATIVE CARDIOVASCULAR EXAMINATION: ICD-10-CM

## 2023-04-19 DIAGNOSIS — Z01.811 PRE-OPERATIVE RESPIRATORY EXAMINATION: ICD-10-CM

## 2023-04-19 LAB
ANION GAP SERPL CALC-SCNC: 9 MMOL/L (ref 7–16)
APTT PPP: 27 SEC (ref 24.7–36)
BASOPHILS # BLD AUTO: 0.3 % (ref 0–1.8)
BASOPHILS # BLD: 0.02 K/UL (ref 0–0.12)
BUN SERPL-MCNC: 19 MG/DL (ref 8–22)
CALCIUM SERPL-MCNC: 9.7 MG/DL (ref 8.5–10.5)
CHLORIDE SERPL-SCNC: 103 MMOL/L (ref 96–112)
CO2 SERPL-SCNC: 29 MMOL/L (ref 20–33)
CREAT SERPL-MCNC: 0.74 MG/DL (ref 0.5–1.4)
EKG IMPRESSION: NORMAL
EOSINOPHIL # BLD AUTO: 0.04 K/UL (ref 0–0.51)
EOSINOPHIL NFR BLD: 0.6 % (ref 0–6.9)
ERYTHROCYTE [DISTWIDTH] IN BLOOD BY AUTOMATED COUNT: 42.3 FL (ref 35.9–50)
GFR SERPLBLD CREATININE-BSD FMLA CKD-EPI: 83 ML/MIN/1.73 M 2
GLUCOSE SERPL-MCNC: 91 MG/DL (ref 65–99)
HCT VFR BLD AUTO: 43.1 % (ref 37–47)
HGB BLD-MCNC: 14.5 G/DL (ref 12–16)
IMM GRANULOCYTES # BLD AUTO: 0.03 K/UL (ref 0–0.11)
IMM GRANULOCYTES NFR BLD AUTO: 0.4 % (ref 0–0.9)
INR PPP: 1.01 (ref 0.87–1.13)
LYMPHOCYTES # BLD AUTO: 1.41 K/UL (ref 1–4.8)
LYMPHOCYTES NFR BLD: 20.9 % (ref 22–41)
MCH RBC QN AUTO: 30.4 PG (ref 27–33)
MCHC RBC AUTO-ENTMCNC: 33.6 G/DL (ref 33.6–35)
MCV RBC AUTO: 90.4 FL (ref 81.4–97.8)
MONOCYTES # BLD AUTO: 0.5 K/UL (ref 0–0.85)
MONOCYTES NFR BLD AUTO: 7.4 % (ref 0–13.4)
NEUTROPHILS # BLD AUTO: 4.76 K/UL (ref 2–7.15)
NEUTROPHILS NFR BLD: 70.4 % (ref 44–72)
NRBC # BLD AUTO: 0 K/UL
NRBC BLD-RTO: 0 /100 WBC
PLATELET # BLD AUTO: 161 K/UL (ref 164–446)
PMV BLD AUTO: 11.8 FL (ref 9–12.9)
POTASSIUM SERPL-SCNC: 4.1 MMOL/L (ref 3.6–5.5)
PROTHROMBIN TIME: 13.2 SEC (ref 12–14.6)
RBC # BLD AUTO: 4.77 M/UL (ref 4.2–5.4)
SODIUM SERPL-SCNC: 141 MMOL/L (ref 135–145)
WBC # BLD AUTO: 6.8 K/UL (ref 4.8–10.8)

## 2023-04-19 PROCEDURE — 93010 ELECTROCARDIOGRAM REPORT: CPT | Performed by: INTERNAL MEDICINE

## 2023-04-19 PROCEDURE — 36415 COLL VENOUS BLD VENIPUNCTURE: CPT

## 2023-04-19 PROCEDURE — 80048 BASIC METABOLIC PNL TOTAL CA: CPT

## 2023-04-19 PROCEDURE — 85730 THROMBOPLASTIN TIME PARTIAL: CPT

## 2023-04-19 PROCEDURE — 93005 ELECTROCARDIOGRAM TRACING: CPT

## 2023-04-19 PROCEDURE — 85610 PROTHROMBIN TIME: CPT

## 2023-04-19 PROCEDURE — 85025 COMPLETE CBC W/AUTO DIFF WBC: CPT

## 2023-04-19 PROCEDURE — 71046 X-RAY EXAM CHEST 2 VIEWS: CPT

## 2023-04-20 ENCOUNTER — OFFICE VISIT (OUTPATIENT)
Dept: INTERNAL MEDICINE | Facility: IMAGING CENTER | Age: 78
End: 2023-04-20
Payer: MEDICARE

## 2023-04-20 VITALS
OXYGEN SATURATION: 95 % | SYSTOLIC BLOOD PRESSURE: 122 MMHG | DIASTOLIC BLOOD PRESSURE: 70 MMHG | TEMPERATURE: 98 F | RESPIRATION RATE: 12 BRPM | HEART RATE: 90 BPM

## 2023-04-20 DIAGNOSIS — H10.32 ACUTE CONJUNCTIVITIS OF LEFT EYE, UNSPECIFIED ACUTE CONJUNCTIVITIS TYPE: ICD-10-CM

## 2023-04-20 DIAGNOSIS — H11.822: ICD-10-CM

## 2023-04-20 DIAGNOSIS — G20.A1 PARKINSON'S DISEASE (HCC): ICD-10-CM

## 2023-04-20 PROCEDURE — 99213 OFFICE O/P EST LOW 20 MIN: CPT | Performed by: INTERNAL MEDICINE

## 2023-04-20 RX ORDER — OFLOXACIN 3 MG/ML
1 SOLUTION/ DROPS OPHTHALMIC 4 TIMES DAILY
Qty: 5 ML | Refills: 0 | Status: SHIPPED | OUTPATIENT
Start: 2023-04-20 | End: 2023-04-27

## 2023-05-01 ENCOUNTER — HOSPITAL ENCOUNTER (OUTPATIENT)
Facility: MEDICAL CENTER | Age: 78
End: 2023-05-03
Attending: NEUROLOGICAL SURGERY | Admitting: NEUROLOGICAL SURGERY
Payer: MEDICARE

## 2023-05-01 ENCOUNTER — ANESTHESIA (OUTPATIENT)
Dept: SURGERY | Facility: MEDICAL CENTER | Age: 78
End: 2023-05-01
Payer: MEDICARE

## 2023-05-01 ENCOUNTER — APPOINTMENT (OUTPATIENT)
Dept: RADIOLOGY | Facility: MEDICAL CENTER | Age: 78
End: 2023-05-01
Attending: NEUROLOGICAL SURGERY
Payer: MEDICARE

## 2023-05-01 ENCOUNTER — ANESTHESIA EVENT (OUTPATIENT)
Dept: SURGERY | Facility: MEDICAL CENTER | Age: 78
End: 2023-05-01
Payer: MEDICARE

## 2023-05-01 PROBLEM — M48.061 LUMBAR STENOSIS WITHOUT NEUROGENIC CLAUDICATION: Status: ACTIVE | Noted: 2023-05-01

## 2023-05-01 PROCEDURE — A9270 NON-COVERED ITEM OR SERVICE: HCPCS | Performed by: NURSE PRACTITIONER

## 2023-05-01 PROCEDURE — 160009 HCHG ANES TIME/MIN: Performed by: NEUROLOGICAL SURGERY

## 2023-05-01 PROCEDURE — 160002 HCHG RECOVERY MINUTES (STAT): Performed by: NEUROLOGICAL SURGERY

## 2023-05-01 PROCEDURE — 110454 HCHG SHELL REV 250: Performed by: NEUROLOGICAL SURGERY

## 2023-05-01 PROCEDURE — 110371 HCHG SHELL REV 272: Performed by: NEUROLOGICAL SURGERY

## 2023-05-01 PROCEDURE — 160048 HCHG OR STATISTICAL LEVEL 1-5: Performed by: NEUROLOGICAL SURGERY

## 2023-05-01 PROCEDURE — 700105 HCHG RX REV CODE 258: Performed by: NURSE PRACTITIONER

## 2023-05-01 PROCEDURE — 160035 HCHG PACU - 1ST 60 MINS PHASE I: Performed by: NEUROLOGICAL SURGERY

## 2023-05-01 PROCEDURE — 700105 HCHG RX REV CODE 258: Performed by: NEUROLOGICAL SURGERY

## 2023-05-01 PROCEDURE — 700111 HCHG RX REV CODE 636 W/ 250 OVERRIDE (IP): Performed by: ANESTHESIOLOGY

## 2023-05-01 PROCEDURE — 700101 HCHG RX REV CODE 250: Performed by: ANESTHESIOLOGY

## 2023-05-01 PROCEDURE — 700111 HCHG RX REV CODE 636 W/ 250 OVERRIDE (IP): Performed by: NEUROLOGICAL SURGERY

## 2023-05-01 PROCEDURE — 160036 HCHG PACU - EA ADDL 30 MINS PHASE I: Performed by: NEUROLOGICAL SURGERY

## 2023-05-01 PROCEDURE — 700105 HCHG RX REV CODE 258: Performed by: ANESTHESIOLOGY

## 2023-05-01 PROCEDURE — 96365 THER/PROPH/DIAG IV INF INIT: CPT | Mod: XU

## 2023-05-01 PROCEDURE — 99100 ANES PT EXTEME AGE<1 YR&>70: CPT | Performed by: ANESTHESIOLOGY

## 2023-05-01 PROCEDURE — 700111 HCHG RX REV CODE 636 W/ 250 OVERRIDE (IP): Performed by: NURSE PRACTITIONER

## 2023-05-01 PROCEDURE — 700102 HCHG RX REV CODE 250 W/ 637 OVERRIDE(OP): Performed by: NURSE PRACTITIONER

## 2023-05-01 PROCEDURE — 160029 HCHG SURGERY MINUTES - 1ST 30 MINS LEVEL 4: Performed by: NEUROLOGICAL SURGERY

## 2023-05-01 PROCEDURE — 700101 HCHG RX REV CODE 250: Performed by: NURSE PRACTITIONER

## 2023-05-01 PROCEDURE — 700106 HCHG RX REV CODE 271: Performed by: NEUROLOGICAL SURGERY

## 2023-05-01 PROCEDURE — G0378 HOSPITAL OBSERVATION PER HR: HCPCS

## 2023-05-01 PROCEDURE — 00630 ANES PX LUMBAR REGION NOS: CPT | Performed by: ANESTHESIOLOGY

## 2023-05-01 PROCEDURE — 160041 HCHG SURGERY MINUTES - EA ADDL 1 MIN LEVEL 4: Performed by: NEUROLOGICAL SURGERY

## 2023-05-01 PROCEDURE — A9270 NON-COVERED ITEM OR SERVICE: HCPCS | Performed by: ANESTHESIOLOGY

## 2023-05-01 PROCEDURE — 72020 X-RAY EXAM OF SPINE 1 VIEW: CPT

## 2023-05-01 PROCEDURE — 700101 HCHG RX REV CODE 250: Performed by: NEUROLOGICAL SURGERY

## 2023-05-01 PROCEDURE — 700102 HCHG RX REV CODE 250 W/ 637 OVERRIDE(OP): Performed by: ANESTHESIOLOGY

## 2023-05-01 RX ORDER — CEFAZOLIN SODIUM 1 G/3ML
INJECTION, POWDER, FOR SOLUTION INTRAMUSCULAR; INTRAVENOUS PRN
Status: DISCONTINUED | OUTPATIENT
Start: 2023-05-01 | End: 2023-05-01 | Stop reason: SURG

## 2023-05-01 RX ORDER — HYDROMORPHONE HYDROCHLORIDE 1 MG/ML
0.1 INJECTION, SOLUTION INTRAMUSCULAR; INTRAVENOUS; SUBCUTANEOUS
Status: DISCONTINUED | OUTPATIENT
Start: 2023-05-01 | End: 2023-05-01 | Stop reason: HOSPADM

## 2023-05-01 RX ORDER — ACETAMINOPHEN 325 MG/1
650 TABLET ORAL EVERY 6 HOURS PRN
Status: DISCONTINUED | OUTPATIENT
Start: 2023-05-01 | End: 2023-05-03 | Stop reason: HOSPADM

## 2023-05-01 RX ORDER — MEPERIDINE HYDROCHLORIDE 25 MG/ML
12.5 INJECTION INTRAMUSCULAR; INTRAVENOUS; SUBCUTANEOUS
Status: DISCONTINUED | OUTPATIENT
Start: 2023-05-01 | End: 2023-05-01 | Stop reason: HOSPADM

## 2023-05-01 RX ORDER — DIAZEPAM 5 MG/1
5 TABLET ORAL EVERY 4 HOURS PRN
Status: DISCONTINUED | OUTPATIENT
Start: 2023-05-01 | End: 2023-05-03 | Stop reason: HOSPADM

## 2023-05-01 RX ORDER — BISACODYL 10 MG
10 SUPPOSITORY, RECTAL RECTAL
Status: DISCONTINUED | OUTPATIENT
Start: 2023-05-01 | End: 2023-05-03 | Stop reason: HOSPADM

## 2023-05-01 RX ORDER — SODIUM CHLORIDE, SODIUM LACTATE, POTASSIUM CHLORIDE, AND CALCIUM CHLORIDE .6; .31; .03; .02 G/100ML; G/100ML; G/100ML; G/100ML
IRRIGANT IRRIGATION
Status: DISCONTINUED | OUTPATIENT
Start: 2023-05-01 | End: 2023-05-01 | Stop reason: HOSPADM

## 2023-05-01 RX ORDER — ROCURONIUM BROMIDE 10 MG/ML
INJECTION, SOLUTION INTRAVENOUS PRN
Status: DISCONTINUED | OUTPATIENT
Start: 2023-05-01 | End: 2023-05-01 | Stop reason: SURG

## 2023-05-01 RX ORDER — LEVOTHYROXINE SODIUM 0.05 MG/1
50 TABLET ORAL DAILY
Status: DISCONTINUED | OUTPATIENT
Start: 2023-05-02 | End: 2023-05-03 | Stop reason: HOSPADM

## 2023-05-01 RX ORDER — METHOCARBAMOL 750 MG/1
750 TABLET, FILM COATED ORAL EVERY 8 HOURS PRN
Status: DISCONTINUED | OUTPATIENT
Start: 2023-05-01 | End: 2023-05-03 | Stop reason: HOSPADM

## 2023-05-01 RX ORDER — CEFAZOLIN SODIUM 1 G/3ML
INJECTION, POWDER, FOR SOLUTION INTRAMUSCULAR; INTRAVENOUS
Status: DISCONTINUED | OUTPATIENT
Start: 2023-05-01 | End: 2023-05-01 | Stop reason: HOSPADM

## 2023-05-01 RX ORDER — OXYCODONE HYDROCHLORIDE 5 MG/1
5 TABLET ORAL EVERY 4 HOURS PRN
Status: DISCONTINUED | OUTPATIENT
Start: 2023-05-01 | End: 2023-05-03 | Stop reason: HOSPADM

## 2023-05-01 RX ORDER — ENEMA 19; 7 G/133ML; G/133ML
1 ENEMA RECTAL
Status: DISCONTINUED | OUTPATIENT
Start: 2023-05-01 | End: 2023-05-03 | Stop reason: HOSPADM

## 2023-05-01 RX ORDER — LABETALOL HYDROCHLORIDE 5 MG/ML
10 INJECTION, SOLUTION INTRAVENOUS
Status: DISCONTINUED | OUTPATIENT
Start: 2023-05-01 | End: 2023-05-03 | Stop reason: HOSPADM

## 2023-05-01 RX ORDER — MAGNESIUM HYDROXIDE 1200 MG/15ML
LIQUID ORAL
Status: COMPLETED | OUTPATIENT
Start: 2023-05-01 | End: 2023-05-01

## 2023-05-01 RX ORDER — LIDOCAINE HYDROCHLORIDE 20 MG/ML
INJECTION, SOLUTION EPIDURAL; INFILTRATION; INTRACAUDAL; PERINEURAL PRN
Status: DISCONTINUED | OUTPATIENT
Start: 2023-05-01 | End: 2023-05-01 | Stop reason: SURG

## 2023-05-01 RX ORDER — SODIUM CHLORIDE AND POTASSIUM CHLORIDE 150; 900 MG/100ML; MG/100ML
INJECTION, SOLUTION INTRAVENOUS CONTINUOUS
Status: DISCONTINUED | OUTPATIENT
Start: 2023-05-01 | End: 2023-05-02

## 2023-05-01 RX ORDER — DOCUSATE SODIUM 100 MG/1
100 CAPSULE, LIQUID FILLED ORAL 2 TIMES DAILY
Status: DISCONTINUED | OUTPATIENT
Start: 2023-05-01 | End: 2023-05-03 | Stop reason: HOSPADM

## 2023-05-01 RX ORDER — BUPIVACAINE HYDROCHLORIDE AND EPINEPHRINE 5; 5 MG/ML; UG/ML
INJECTION, SOLUTION PERINEURAL
Status: DISCONTINUED | OUTPATIENT
Start: 2023-05-01 | End: 2023-05-01 | Stop reason: HOSPADM

## 2023-05-01 RX ORDER — DEXAMETHASONE SODIUM PHOSPHATE 4 MG/ML
INJECTION, SOLUTION INTRA-ARTICULAR; INTRALESIONAL; INTRAMUSCULAR; INTRAVENOUS; SOFT TISSUE PRN
Status: DISCONTINUED | OUTPATIENT
Start: 2023-05-01 | End: 2023-05-01 | Stop reason: SURG

## 2023-05-01 RX ORDER — SODIUM CHLORIDE, SODIUM LACTATE, POTASSIUM CHLORIDE, CALCIUM CHLORIDE 600; 310; 30; 20 MG/100ML; MG/100ML; MG/100ML; MG/100ML
INJECTION, SOLUTION INTRAVENOUS CONTINUOUS
Status: DISCONTINUED | OUTPATIENT
Start: 2023-05-01 | End: 2023-05-01 | Stop reason: HOSPADM

## 2023-05-01 RX ORDER — VENLAFAXINE HYDROCHLORIDE 75 MG/1
150 CAPSULE, EXTENDED RELEASE ORAL DAILY
Status: DISCONTINUED | OUTPATIENT
Start: 2023-05-01 | End: 2023-05-03 | Stop reason: HOSPADM

## 2023-05-01 RX ORDER — ONDANSETRON 2 MG/ML
INJECTION INTRAMUSCULAR; INTRAVENOUS PRN
Status: DISCONTINUED | OUTPATIENT
Start: 2023-05-01 | End: 2023-05-01 | Stop reason: SURG

## 2023-05-01 RX ORDER — AMOXICILLIN 250 MG
1 CAPSULE ORAL NIGHTLY
Status: DISCONTINUED | OUTPATIENT
Start: 2023-05-01 | End: 2023-05-03 | Stop reason: HOSPADM

## 2023-05-01 RX ORDER — ONDANSETRON 2 MG/ML
4 INJECTION INTRAMUSCULAR; INTRAVENOUS EVERY 4 HOURS PRN
Status: DISCONTINUED | OUTPATIENT
Start: 2023-05-01 | End: 2023-05-03 | Stop reason: HOSPADM

## 2023-05-01 RX ORDER — OXYCODONE HCL 5 MG/5 ML
5 SOLUTION, ORAL ORAL
Status: COMPLETED | OUTPATIENT
Start: 2023-05-01 | End: 2023-05-01

## 2023-05-01 RX ORDER — POLYETHYLENE GLYCOL 3350 17 G/17G
1 POWDER, FOR SOLUTION ORAL 2 TIMES DAILY PRN
Status: DISCONTINUED | OUTPATIENT
Start: 2023-05-01 | End: 2023-05-03 | Stop reason: HOSPADM

## 2023-05-01 RX ORDER — AMOXICILLIN 250 MG
1 CAPSULE ORAL
Status: DISCONTINUED | OUTPATIENT
Start: 2023-05-01 | End: 2023-05-03 | Stop reason: HOSPADM

## 2023-05-01 RX ORDER — ONDANSETRON 4 MG/1
4 TABLET, ORALLY DISINTEGRATING ORAL EVERY 4 HOURS PRN
Status: DISCONTINUED | OUTPATIENT
Start: 2023-05-01 | End: 2023-05-03 | Stop reason: HOSPADM

## 2023-05-01 RX ORDER — OXYCODONE HCL 5 MG/5 ML
10 SOLUTION, ORAL ORAL
Status: COMPLETED | OUTPATIENT
Start: 2023-05-01 | End: 2023-05-01

## 2023-05-01 RX ORDER — HYDROMORPHONE HYDROCHLORIDE 1 MG/ML
0.4 INJECTION, SOLUTION INTRAMUSCULAR; INTRAVENOUS; SUBCUTANEOUS
Status: DISCONTINUED | OUTPATIENT
Start: 2023-05-01 | End: 2023-05-01 | Stop reason: HOSPADM

## 2023-05-01 RX ORDER — CYCLOBENZAPRINE HCL 10 MG
10 TABLET ORAL EVERY 8 HOURS PRN
Status: DISCONTINUED | OUTPATIENT
Start: 2023-05-01 | End: 2023-05-03 | Stop reason: HOSPADM

## 2023-05-01 RX ORDER — DIPHENHYDRAMINE HYDROCHLORIDE 50 MG/ML
25 INJECTION INTRAMUSCULAR; INTRAVENOUS EVERY 6 HOURS PRN
Status: DISCONTINUED | OUTPATIENT
Start: 2023-05-01 | End: 2023-05-03 | Stop reason: HOSPADM

## 2023-05-01 RX ORDER — SODIUM CHLORIDE, SODIUM LACTATE, POTASSIUM CHLORIDE, CALCIUM CHLORIDE 600; 310; 30; 20 MG/100ML; MG/100ML; MG/100ML; MG/100ML
INJECTION, SOLUTION INTRAVENOUS
Status: DISCONTINUED | OUTPATIENT
Start: 2023-05-01 | End: 2023-05-01 | Stop reason: SURG

## 2023-05-01 RX ORDER — LORATADINE 10 MG/1
10 TABLET ORAL DAILY
Status: DISCONTINUED | OUTPATIENT
Start: 2023-05-02 | End: 2023-05-03 | Stop reason: HOSPADM

## 2023-05-01 RX ORDER — HYDROMORPHONE HYDROCHLORIDE 1 MG/ML
0.2 INJECTION, SOLUTION INTRAMUSCULAR; INTRAVENOUS; SUBCUTANEOUS
Status: DISCONTINUED | OUTPATIENT
Start: 2023-05-01 | End: 2023-05-01 | Stop reason: HOSPADM

## 2023-05-01 RX ORDER — SODIUM CHLORIDE, SODIUM LACTATE, POTASSIUM CHLORIDE, CALCIUM CHLORIDE 600; 310; 30; 20 MG/100ML; MG/100ML; MG/100ML; MG/100ML
INJECTION, SOLUTION INTRAVENOUS CONTINUOUS
Status: ACTIVE | OUTPATIENT
Start: 2023-05-01 | End: 2023-05-01

## 2023-05-01 RX ORDER — DIPHENHYDRAMINE HCL 25 MG
25 TABLET ORAL EVERY 6 HOURS PRN
Status: DISCONTINUED | OUTPATIENT
Start: 2023-05-01 | End: 2023-05-03 | Stop reason: HOSPADM

## 2023-05-01 RX ORDER — FLUTICASONE PROPIONATE 50 MCG
1 SPRAY, SUSPENSION (ML) NASAL DAILY
Status: DISCONTINUED | OUTPATIENT
Start: 2023-05-02 | End: 2023-05-03 | Stop reason: HOSPADM

## 2023-05-01 RX ORDER — MORPHINE SULFATE 4 MG/ML
2 INJECTION INTRAVENOUS
Status: DISCONTINUED | OUTPATIENT
Start: 2023-05-01 | End: 2023-05-03 | Stop reason: HOSPADM

## 2023-05-01 RX ADMIN — FENTANYL CITRATE 25 MCG: 50 INJECTION, SOLUTION INTRAMUSCULAR; INTRAVENOUS at 14:16

## 2023-05-01 RX ADMIN — FENTANYL CITRATE 50 MCG: 50 INJECTION, SOLUTION INTRAMUSCULAR; INTRAVENOUS at 12:55

## 2023-05-01 RX ADMIN — SODIUM CHLORIDE, POTASSIUM CHLORIDE, SODIUM LACTATE AND CALCIUM CHLORIDE: 600; 310; 30; 20 INJECTION, SOLUTION INTRAVENOUS at 11:40

## 2023-05-01 RX ADMIN — HYDROMORPHONE HYDROCHLORIDE 0.2 MG: 1 INJECTION, SOLUTION INTRAMUSCULAR; INTRAVENOUS; SUBCUTANEOUS at 15:05

## 2023-05-01 RX ADMIN — CEFAZOLIN 2 G: 330 INJECTION, POWDER, FOR SOLUTION INTRAMUSCULAR; INTRAVENOUS at 11:48

## 2023-05-01 RX ADMIN — FENTANYL CITRATE 25 MCG: 50 INJECTION, SOLUTION INTRAMUSCULAR; INTRAVENOUS at 14:13

## 2023-05-01 RX ADMIN — FENTANYL CITRATE 50 MCG: 50 INJECTION, SOLUTION INTRAMUSCULAR; INTRAVENOUS at 13:04

## 2023-05-01 RX ADMIN — CEFAZOLIN 2 G: 2 INJECTION, POWDER, FOR SOLUTION INTRAMUSCULAR; INTRAVENOUS at 21:54

## 2023-05-01 RX ADMIN — FENTANYL CITRATE 25 MCG: 50 INJECTION, SOLUTION INTRAMUSCULAR; INTRAVENOUS at 14:04

## 2023-05-01 RX ADMIN — OXYCODONE HYDROCHLORIDE 5 MG: 5 SOLUTION ORAL at 14:02

## 2023-05-01 RX ADMIN — ONDANSETRON 4 MG: 2 INJECTION INTRAMUSCULAR; INTRAVENOUS at 13:07

## 2023-05-01 RX ADMIN — DOCUSATE SODIUM 50 MG AND SENNOSIDES 8.6 MG 1 TABLET: 8.6; 5 TABLET, FILM COATED ORAL at 22:03

## 2023-05-01 RX ADMIN — PROPOFOL 200 MG: 10 INJECTION, EMULSION INTRAVENOUS at 11:48

## 2023-05-01 RX ADMIN — DOCUSATE SODIUM 100 MG: 100 CAPSULE, LIQUID FILLED ORAL at 17:13

## 2023-05-01 RX ADMIN — DEXAMETHASONE SODIUM PHOSPHATE 8 MG: 4 INJECTION, SOLUTION INTRA-ARTICULAR; INTRALESIONAL; INTRAMUSCULAR; INTRAVENOUS; SOFT TISSUE at 11:48

## 2023-05-01 RX ADMIN — HYDROMORPHONE HYDROCHLORIDE 0.2 MG: 1 INJECTION, SOLUTION INTRAMUSCULAR; INTRAVENOUS; SUBCUTANEOUS at 14:19

## 2023-05-01 RX ADMIN — POTASSIUM CHLORIDE AND SODIUM CHLORIDE: 900; 150 INJECTION, SOLUTION INTRAVENOUS at 16:35

## 2023-05-01 RX ADMIN — SUGAMMADEX 200 MG: 100 INJECTION, SOLUTION INTRAVENOUS at 13:08

## 2023-05-01 RX ADMIN — FENTANYL CITRATE 50 MCG: 50 INJECTION, SOLUTION INTRAMUSCULAR; INTRAVENOUS at 13:15

## 2023-05-01 RX ADMIN — FENTANYL CITRATE 25 MCG: 50 INJECTION, SOLUTION INTRAMUSCULAR; INTRAVENOUS at 14:08

## 2023-05-01 RX ADMIN — LIDOCAINE HYDROCHLORIDE 100 MG: 20 INJECTION, SOLUTION EPIDURAL; INFILTRATION; INTRACAUDAL at 11:48

## 2023-05-01 RX ADMIN — HYDROMORPHONE HYDROCHLORIDE 0.2 MG: 1 INJECTION, SOLUTION INTRAMUSCULAR; INTRAVENOUS; SUBCUTANEOUS at 14:24

## 2023-05-01 RX ADMIN — ROCURONIUM BROMIDE 50 MG: 10 INJECTION, SOLUTION INTRAVENOUS at 11:48

## 2023-05-01 RX ADMIN — SODIUM CHLORIDE, POTASSIUM CHLORIDE, SODIUM LACTATE AND CALCIUM CHLORIDE: 600; 310; 30; 20 INJECTION, SOLUTION INTRAVENOUS at 11:41

## 2023-05-01 ASSESSMENT — FIBROSIS 4 INDEX: FIB4 SCORE: 3.175426480542941705

## 2023-05-01 ASSESSMENT — PAIN DESCRIPTION - PAIN TYPE: TYPE: SURGICAL PAIN

## 2023-05-01 NOTE — ANESTHESIA PREPROCEDURE EVALUATION
Case: [id number] Date/Time: 05/01/23 1245    Procedure: POSTERIOR LEFT L4-5 LUMBAR INTERNAL DECOMPRESSION AND L5-S1 TRANSPEDICULAR DECOMPRESSION    Pre-op diagnosis: SPINAL STENOSIS OF LUMBAR REGION    Location: [facility] OR 05 / SURGERY [facility]    Surgeons: Nayan Reyes M.D.          Relevant Problems   GI   (positive) Gastroesophageal reflux disease with esophagitis      ENDO   (positive) Acquired hypothyroidism       Physical Exam    Airway   Mallampati: II  TM distance: >3 FB  Neck ROM: full       Cardiovascular - normal exam  Rhythm: regular  Rate: normal  (-) murmur     Dental - normal exam           Pulmonary - normal exam  Breath sounds clear to auscultation     Abdominal    Neurological - normal exam                 Anesthesia Plan    ASA 3       Plan - general       Airway plan will be ETT          Induction: intravenous    Postoperative Plan: Postoperative administration of opioids is intended.    Pertinent diagnostic labs and testing reviewed    Informed Consent:    Anesthetic plan and risks discussed with patient.    Use of blood products discussed with: patient whom consented to blood products.

## 2023-05-01 NOTE — OR NURSING
1322 Pt out from OR. Pt sleeping. VSS. Posterior lower back site with dressing and hemovac drain in place is CDI, no signs of bleeding.  1340 Dr. Reyes at the bedside  1350 Called Alonso, pt's spouse and updated on POC  1400 Tolerating orals  1515 Report to Liam MONTAGUE  1538 Pt transferred to T332 by transport, with maroon suitcase from AM locker  up to T332.

## 2023-05-01 NOTE — OP REPORT
DATE OF SERVICE:  05/01/2023     PREOPERATIVE DIAGNOSIS:  Left L5 radiculopathy secondary to left L4-L5 lateral   recess stenosis plus left L5-S1 foraminal stenosis.     POSTOPERATIVE DIAGNOSIS:  Left L5 radiculopathy secondary to left L4-L5   lateral recess stenosis plus left L5-S1 foraminal stenosis.     OPERATIONS:  1.  Microscopic left L4-L5 laminotomy, proximal foraminotomy, decompression of   proximal left L5 nerve root.  2.  Microscopic left L5-S1 transpedicular decompression of distal left L5   nerve root.     SURGEON:  Nayan Reyes MD     ASSISTANT:  RONAN Mascorro     ANESTHESIA:  General endotracheal.     ANESTHESIOLOGIST:  Tobey Gansert, MD     PREPARATION:  ChloraPrep.     MEDICATIONS:  The patient given Ancef prior to incision.     INDICATIONS:  This woman with a left L5 radicular syndrome refractory to   nonoperative therapies.  She had stenosis as described above.  The patient was   felt to be a candidate for operative decompression to attempt to relieve   radicular pain.  The patient understood major risks and complications,   paralysis exceedingly rare, biggest risk of surgery is nonresponse, which is   10%, chance to require another operation 10-15%, small risk of wound   infection, spinal fluid leak.  The patient is understanding and agreed to   proceed and signed consent.     NEED FOR SURGICAL ASSISTANCE:  Surgical assistance required under both loupe   and microscopic magnification, retraction, suction, irrigation, cleaning of   instruments, keep the case moving forward to minimize operative time.     DESCRIPTION OF PROCEDURE:  The patient was brought to the operating room.    Peripheral venous line was in place.  General anesthesia was induced.  The   patient intubated.  No Mckeon catheter was placed.  The patient laid prone on   the OSI table using 6 bolsters.  Pressure points were carefully padded.  The   back was doubly prepped with ChloraPrep by myself and draped.  Linear  incision   was made from L4-S1.  Skin was infiltrated with local and incised with   scalpel using electrocautery dissection deep in the midline down to and   through deep fashion using a subperiosteal dissection.  Paravertebral muscles   dissected off spinous processes, lamina of 4 through ____ on the left side.    Levels confirmed with intraoperative fluoroscopy.  Unilateral James   retractor was placed.  Using Midas Davian drill AM-8 bit, inferior portion of   lamina 4, superior portion of lamina 5, the medial facet was drilled down to   paper thin shelf of bone.  Thin shelf of bone along with ligamentum flavum   removed in piecemeal fashion with 2 and 3 mm Kerrisons.  Bony decompression   was carried out to the medial aspect of the pedicle inferiorly to lower border   of the pedicle.  We did a proximal foraminotomy over the proximal 5 root.  5   root was completely unroofed in the lateral recess.  Operating microscope was   brought in.  Under high power magnification, the superior aspect of the   superior facet of S1 was drilled along with the lateral pars, superior lateral   portion of the inferior facet of L5, the tip of superior facet of S1 to paper   thin shelf of bone.  Thin shelf of bone was resected with 2 mm Kerrisons.    Lateral ligamentum flavum was removed.  A 5 root was identified.  Using an   angled 2 mm Kerrisons, angled curettes foraminal ligamentum flavum was   resected.  Once this was accomplished, I could really pass the nerve probe   medial to lateral, lateral to medial underneath the pars interarticularis.    Epidural exploration revealed hard bulging annulus, no free fragment with a   posterior bony and ligament removal, the 5 root was completely free, mobile   from its origin to lateral to the pedicle.  Throughout, minor bleeders   controlled with Gelfoam powder mixed with thrombin, and bipolar   electrocautery.  Once decompression was completed, a fat graft was placed over   the distal 5  root.  Wound was irrigated with antibiotic irrigation.  Minor   muscle bleeders controlled with bipolar electrocautery.  Medium Hemovac drain   was placed in depth of the wound, brought out through separate stab incision.    Deep fascia was closed with 0 Vicryl, subcutaneous fascia with 0 Vicryl,   subcuticular closed with 3-0 Vicryl.  Drain sutured in place with 0 Vicryl   connected to closed drainage system.  Skin edges approximated with   quarter-inch Steri-Strips.  Final sponge, needle counted, counts correct.     ESTIMATED BLOOD LOSS:   mL.        ______________________________  MD WENDY PUENTE/VINEET/CLEM    DD:  05/01/2023 13:41  DT:  05/01/2023 14:24    Job#:  [id number]    CC:Tobey B. Gansert, MD

## 2023-05-01 NOTE — ANESTHESIA TIME REPORT
Anesthesia Start and Stop Event Times     Date Time Event    5/1/2023 1115 Ready for Procedure     1145 Anesthesia Start     1325 Anesthesia Stop        Responsible Staff  05/01/23    Name Role Begin End    Tobey Gansert, M.D. Anesth 1145 1325        Overtime Reason:  no overtime (within assigned shift)    Comments:

## 2023-05-01 NOTE — ANESTHESIA PROCEDURE NOTES
Airway    Date/Time: 5/1/2023 11:49 AM  Performed by: Tobey Gansert, M.D.  Authorized by: Tobey Gansert, M.D.     Location:  OR  Urgency:  Elective  Indications for Airway Management:  Anesthesia      Spontaneous Ventilation: absent    Sedation Level:  Deep  Preoxygenated: Yes    Patient Position:  Sniffing  Final Airway Type:  Endotracheal airway  Final Endotracheal Airway:  ETT  Cuffed: Yes    Technique Used for Successful ETT Placement:  Direct laryngoscopy    Insertion Site:  Oral  Blade Type:  Heather  Laryngoscope Blade/Videolaryngoscope Blade Size:  3  ETT Size (mm):  7.0  Measured from:  Teeth  ETT to Teeth (cm):  22  Placement Verified by: auscultation and capnometry    Cormack-Lehane Classification:  Grade I - full view of glottis  Number of Attempts at Approach:  1

## 2023-05-02 PROCEDURE — 700102 HCHG RX REV CODE 250 W/ 637 OVERRIDE(OP): Performed by: NURSE PRACTITIONER

## 2023-05-02 PROCEDURE — A9270 NON-COVERED ITEM OR SERVICE: HCPCS | Performed by: NEUROLOGICAL SURGERY

## 2023-05-02 PROCEDURE — 97162 PT EVAL MOD COMPLEX 30 MIN: CPT

## 2023-05-02 PROCEDURE — 700111 HCHG RX REV CODE 636 W/ 250 OVERRIDE (IP): Performed by: NURSE PRACTITIONER

## 2023-05-02 PROCEDURE — 700101 HCHG RX REV CODE 250: Performed by: NURSE PRACTITIONER

## 2023-05-02 PROCEDURE — 700111 HCHG RX REV CODE 636 W/ 250 OVERRIDE (IP): Performed by: NEUROLOGICAL SURGERY

## 2023-05-02 PROCEDURE — 97165 OT EVAL LOW COMPLEX 30 MIN: CPT

## 2023-05-02 PROCEDURE — 51798 US URINE CAPACITY MEASURE: CPT

## 2023-05-02 PROCEDURE — A9270 NON-COVERED ITEM OR SERVICE: HCPCS | Performed by: NURSE PRACTITIONER

## 2023-05-02 PROCEDURE — 97535 SELF CARE MNGMENT TRAINING: CPT

## 2023-05-02 PROCEDURE — 96375 TX/PRO/DX INJ NEW DRUG ADDON: CPT

## 2023-05-02 PROCEDURE — G0378 HOSPITAL OBSERVATION PER HR: HCPCS

## 2023-05-02 PROCEDURE — 700105 HCHG RX REV CODE 258: Performed by: NURSE PRACTITIONER

## 2023-05-02 PROCEDURE — 97166 OT EVAL MOD COMPLEX 45 MIN: CPT

## 2023-05-02 PROCEDURE — 700102 HCHG RX REV CODE 250 W/ 637 OVERRIDE(OP): Performed by: NEUROLOGICAL SURGERY

## 2023-05-02 RX ADMIN — ACETAMINOPHEN 650 MG: 325 TABLET, FILM COATED ORAL at 21:03

## 2023-05-02 RX ADMIN — OXYCODONE 5 MG: 5 TABLET ORAL at 09:55

## 2023-05-02 RX ADMIN — CEFAZOLIN 2 G: 2 INJECTION, POWDER, FOR SOLUTION INTRAMUSCULAR; INTRAVENOUS at 04:00

## 2023-05-02 RX ADMIN — LORATADINE 10 MG: 10 TABLET ORAL at 05:25

## 2023-05-02 RX ADMIN — DOCUSATE SODIUM 100 MG: 100 CAPSULE, LIQUID FILLED ORAL at 05:24

## 2023-05-02 RX ADMIN — POTASSIUM CHLORIDE AND SODIUM CHLORIDE: 900; 150 INJECTION, SOLUTION INTRAVENOUS at 05:29

## 2023-05-02 RX ADMIN — METHOCARBAMOL 750 MG: 750 TABLET ORAL at 02:05

## 2023-05-02 RX ADMIN — VENLAFAXINE HYDROCHLORIDE 150 MG: 75 CAPSULE, EXTENDED RELEASE ORAL at 05:24

## 2023-05-02 RX ADMIN — LEVOTHYROXINE SODIUM 50 MCG: 0.05 TABLET ORAL at 05:25

## 2023-05-02 RX ADMIN — MORPHINE SULFATE 2 MG: 4 INJECTION INTRAVENOUS at 00:53

## 2023-05-02 ASSESSMENT — GAIT ASSESSMENTS
DISTANCE (FEET): 100
DEVIATION: BRADYKINETIC;DECREASED BASE OF SUPPORT;SHUFFLED GAIT
GAIT LEVEL OF ASSIST: CONTACT GUARD ASSIST
ASSISTIVE DEVICE: FRONT WHEEL WALKER

## 2023-05-02 ASSESSMENT — PAIN DESCRIPTION - PAIN TYPE
TYPE: SURGICAL PAIN

## 2023-05-02 ASSESSMENT — COGNITIVE AND FUNCTIONAL STATUS - GENERAL
SUGGESTED CMS G CODE MODIFIER DAILY ACTIVITY: CK
CLIMB 3 TO 5 STEPS WITH RAILING: A LOT
DRESSING REGULAR UPPER BODY CLOTHING: A LITTLE
SUGGESTED CMS G CODE MODIFIER DAILY ACTIVITY: CK
CLIMB 3 TO 5 STEPS WITH RAILING: A LITTLE
TOILETING: A LITTLE
EATING MEALS: A LITTLE
MOVING FROM LYING ON BACK TO SITTING ON SIDE OF FLAT BED: UNABLE
MOVING FROM LYING ON BACK TO SITTING ON SIDE OF FLAT BED: A LOT
STANDING UP FROM CHAIR USING ARMS: A LITTLE
DAILY ACTIVITIY SCORE: 18
TOILETING: A LOT
PERSONAL GROOMING: A LITTLE
SUGGESTED CMS G CODE MODIFIER MOBILITY: CL
DRESSING REGULAR LOWER BODY CLOTHING: A LITTLE
MOBILITY SCORE: 13
HELP NEEDED FOR BATHING: A LITTLE
EATING MEALS: A LITTLE
DRESSING REGULAR LOWER BODY CLOTHING: A LITTLE
WALKING IN HOSPITAL ROOM: A LOT
HELP NEEDED FOR BATHING: A LITTLE
WALKING IN HOSPITAL ROOM: A LITTLE
PERSONAL GROOMING: A LITTLE
TURNING FROM BACK TO SIDE WHILE IN FLAT BAD: A LITTLE
SUGGESTED CMS G CODE MODIFIER MOBILITY: CL
STANDING UP FROM CHAIR USING ARMS: A LOT
MOVING TO AND FROM BED TO CHAIR: UNABLE
MOVING TO AND FROM BED TO CHAIR: A LOT
DAILY ACTIVITIY SCORE: 17
TURNING FROM BACK TO SIDE WHILE IN FLAT BAD: UNABLE
MOBILITY SCORE: 12
DRESSING REGULAR UPPER BODY CLOTHING: A LITTLE

## 2023-05-02 ASSESSMENT — PATIENT HEALTH QUESTIONNAIRE - PHQ9
2. FEELING DOWN, DEPRESSED, IRRITABLE, OR HOPELESS: NOT AT ALL
1. LITTLE INTEREST OR PLEASURE IN DOING THINGS: NOT AT ALL
SUM OF ALL RESPONSES TO PHQ9 QUESTIONS 1 AND 2: 0

## 2023-05-02 ASSESSMENT — ACTIVITIES OF DAILY LIVING (ADL): TOILETING: INDEPENDENT

## 2023-05-02 NOTE — PROGRESS NOTES
Verbal order from Geni FERRARO to remove elmore cath and hemovac drain. Elmore cath removed at this time, no complications, will bladder scan Q6H. Hemovac drain removed at this time with no complications, site dressed with tape and sterile gauze, scant drainage.

## 2023-05-02 NOTE — PROGRESS NOTES
4 Eyes Skin Assessment Completed by CLARI Wheeler and CLARI Rosen.    Head WDL  Ears WDL  Nose WDL  Mouth WDL  Neck WDL  Breast/Chest WDL  Shoulder Blades WDL  Spine Surgical incision, dressing cdi, Hemovac intact  (R) Arm/Elbow/Hand WDL  (L) Arm/Elbow/Hand WDL  Abdomen WDL  Groin WDL  Scrotum/Coccyx/Buttocks WDL  (R) Leg WDL  (L) Leg WDL  (R) Heel/Foot/Toe WDL  (L) Heel/Foot/Toe WDL          Devices In Places Blood Pressure Cuff and Nasal Cannula      Interventions In Place Pillows    Possible Skin Injury No    Pictures Uploaded Into Epic N/A  Wound Consult Placed N/A  RN Wound Prevention Protocol Ordered No

## 2023-05-02 NOTE — PROGRESS NOTES
Neurosurgery Progress Note    Subjective:  Some confusion over night  Mckeon in place- urinary retention?    Exam:  BLE str intact CDI with hvac 10    BP  Min: 126/64  Max: 144/75  Pulse  Av.7  Min: 61  Max: 97  Resp  Avg: 15.8  Min: 15  Max: 16  Temp  Av.5 °C (97.7 °F)  Min: 35.9 °C (96.6 °F)  Max: 37.1 °C (98.8 °F)  Monitored Temp 2  Av.9 °C (96.6 °F)  Min: 35.9 °C (96.6 °F)  Max: 35.9 °C (96.6 °F)  SpO2  Av.1 %  Min: 94 %  Max: 97 %    No data recorded                      Intake/Output                         2300 - 23 0659 23 - 23 0659     3499-8210 3017-1372 Total 1414-9035 2713-1225 Total                 Intake    Total Intake -- -- -- -- -- --       Output    Urine  --  1300 1300  1500  -- 1500    Output (mL) (Urethral Catheter) -- 1300 1300 1500 -- 1500    Drains  5  10 15  30  -- 30    Output (mL) (Closed/Suction Drain Posterior Back Hemovac 10 Fr.) 5 10 15 30 -- 30    Total Output 5 1310 1315 1530 -- 1530       Net I/O     -5 -1310 -1315 -1530 -- -1530              Intake/Output Summary (Last 24 hours) at 2023 1546  Last data filed at 2023 1254  Gross per 24 hour   Intake --   Output 2840 ml   Net -2840 ml       $ Bladder Scan Results (mL): 831     fluticasone  1 Spray DAILY    loratadine  10 mg DAILY    levothyroxine  50 mcg DAILY    venlafaxine XR  150 mg DAILY    Pharmacy Consult Request  1 Each PHARMACY TO DOSE    MD ALERT...DO NOT ADMINISTER NSAIDS or ASPIRIN unless ORDERED By Neurosurgery  1 Each PRN    docusate sodium  100 mg BID    senna-docusate  1 Tablet Nightly    senna-docusate  1 Tablet Q24HRS PRN    polyethylene glycol/lytes  1 Packet BID PRN    magnesium hydroxide  30 mL QDAY PRN    bisacodyl  10 mg Q24HRS PRN    sodium phosphate  1 Each Once PRN    0.9 % NaCl with KCl 20 mEq 1,000 mL   Continuous    morphine   Continuous    ondansetron  4 mg Q4HRS PRN    ondansetron  4 mg Q4HRS PRN    diphenhydrAMINE  25 mg Q6HRS PRN    Or     diphenhydrAMINE  25 mg Q6HRS PRN    methocarbamol  750 mg Q8HRS PRN    Or    cyclobenzaprine  10 mg Q8HRS PRN    Or    diazePAM  5 mg Q4HRS PRN    labetalol  10 mg Q HOUR PRN    oxyCODONE immediate-release  5 mg Q4HRS PRN    acetaminophen  650 mg Q6HRS PRN    morphine injection  2 mg Q3HRS PRN       Assessment and Plan:    POD #1 L45 lami, L5TP decompression  Prophylactic anticoagulation: no         Start date/time: tbd    Plan:  DC hvac  DC elmore- q6 PVR  Continue pain control  Ambulate- PT- req one more day

## 2023-05-02 NOTE — DISCHARGE PLANNING
Case Management Discharge Planning    Admission Date: 5/1/2023  GMLOS:    ALOS: 0    6-Clicks ADL Score: 17  6-Clicks Mobility Score: 13  PT and/or OT Eval ordered: Yes  Post-acute Referrals Ordered: No  Post-acute Choice Obtained: NA  Has referral(s) been sent to post-acute provider:  TAHMINA      Anticipated Discharge Dispo: Discharge Disposition: D/T to home under HHA care in anticipation of covered skilled care (06)    DME Needed: No, PT/OT have not yet eval'd pt.    Action(s) Taken: Updated Provider/Nurse on Discharge Plan and DC Assessment Complete (See below)    Escalations Completed: None    Medically Clear: No    Next Steps: Expect that pt will discharge to home with spouse. May need DME. PT/OT have not yet eval'd.    Barriers to Discharge: Medical clearance    Is the patient up for discharge tomorrow: No

## 2023-05-02 NOTE — THERAPY
"Physical Therapy   Initial Evaluation     Patient Name: Kimmy Diaz  Age:  78 y.o., Sex:  female  Medical Record #: [id number]  Today's Date: 5/2/2023     Precautions  Precautions: Fall Risk;Spinal / Back Precautions   Comments: acute confusion    Assessment  Patient is a 78 y.o. female who was admitted s/p L4-S1 microscopic decompression. PMH significant for parkinsonism, anxiety, osteoporosis, spinal stenosis.    Pt received in bed and agreeable to PT session. Pt provided with post-op lumbar spine packet and educated on precautions, but is likely to require further education given cognition. Pt was notably confused to place and reason. Pt required min A in standing to maintain balance due to intermittent posterior leaning, worse with static standing. Pt was able to ambulate at a CGA level. Discussed with , who reports he and their sons are able to assist and that pt is not normally confused. Pt would benefit from further acute PT to progress prior to return home, likely 1-2 additional sessions with family present if able. Currently at a high risk for falls and impulsively getting out of the chair.    Plan    Physical Therapy Initial Treatment Plan   Treatment Plan : Bed Mobility, Gait Training, Neuro Re-Education / Balance, Self Care / Home Evaluation, Therapeutic Activities, Therapeutic Exercise, Stair Training  Treatment Frequency: 5 Times per Week  Duration: Until Therapy Goals Met    DC Equipment Recommendations: Unable to determine at this time  Discharge Recommendations: Recommend post-acute placement for additional physical therapy services prior to discharge home (Likely to progress to home, currently confused and high fall risk)     Subjective    \"Are you guys having an open house today?\" \"[place]\"     Objective       05/02/23 1101   Precautions   Precautions Fall Risk;Spinal / Back Precautions    Comments acute confusion   Pain 0 - 10 Group   Location Back   Therapist " Pain Assessment Post Activity Pain Same as Prior to Activity;Nurse Notified;3   Prior Living Situation   Prior Services None   Housing / Facility 2 Story House   Steps Into Home 2   Steps In Home   (FOS)   Equipment Owned None   Lives with - Patient's Self Care Capacity Spouse   Comments Pt's spouse reports he and their 2 adult sons are able to assist pt on discharge. Their master bedroom is on the first floor of the home. Pt is typically independent and not confused.   Prior Level of Functional Mobility   Bed Mobility Independent   Transfer Status Independent   Ambulation Independent   Assistive Devices Used None   Stairs Independent   History of Falls   History of Falls No   Cognition    Cognition / Consciousness X   Orientation Level Not Oriented to Place;Not Oriented to Reason   Level of Consciousness Alert   Safety Awareness Impaired;Impulsive   Comments Pt pleasant and cooperative, however, notable confused. Pt stated she [place] a shopping mall and was unaware why she is at the hospital. Pt also getting up from the chair despite cues not to and set off alarm multiple times after session. Per , pt not normally confused.   Active ROM Lower Body    Active ROM Lower Body  WDL   Strength Lower Body   Comments BLE grossly 4/5   Sensation Lower Body   Comments Denies LE sensory changes   Lower Body Muscle Tone   Lower Body Muscle Tone  WDL   Coordination Lower Body    Coordination Lower Body  WDL   Balance Assessment   Sitting Balance (Static) Fair -   Sitting Balance (Dynamic) Fair -   Standing Balance (Static) Poor +   Standing Balance (Dynamic) Poor   Weight Shift Sitting Fair   Weight Shift Standing Fair   Comments posterior lean in standing, did improve slightly after walking, required FWW for support   Bed Mobility    Supine to Sit Minimal Assist   Scooting Minimal Assist   Rolling Minimum Assist to Lt.   Comments log roll, significant extra time to scoot to EOB   Gait Analysis   Gait Level Of Assist  Contact Guard Assist   Assistive Device Front Wheel Walker   Distance (Feet) 100   # of Times Distance was Traveled 1   Deviation Bradykinetic;Decreased Base Of Support;Shuffled Gait   Comments Provided cues for FWW and intermittent CGA for safety. Pt with better balance while walking than statically.   Functional Mobility   Sit to Stand Minimal Assist   Bed, Chair, Wheelchair Transfer Minimal Assist   Transfer Method Stand Step   Mobility up with FWW   Comments Cues for hand placement with FWW   How much difficulty does the patient currently have...   Turning over in bed (including adjusting bedclothes, sheets and blankets)? 1   Sitting down on and standing up from a chair with arms (e.g., wheelchair, bedside commode, etc.) 1   Moving from lying on back to sitting on the side of the bed? 1   How much help from another person does the patient currently need...   Moving to and from a bed to a chair (including a wheelchair)? 3   Need to walk in a hospital room? 3   Climbing 3-5 steps with a railing? 3   6 clicks Mobility Score 12   Short Term Goals    Short Term Goal # 1 Pt will perform bed mobility with supervision to progress function in 6 visits.   Short Term Goal # 2 Pt will transfer with FWW and supervision to progress function in 6 visits.   Short Term Goal # 3 Pt will ambulate 100ft with supervision to progress function in 6 visits.   Short Term Goal # 4 Pt will negotiate 2 stairs with min A for safe home entry in 6 visits.   Education Group   Education Provided Role of Physical Therapist;Spine Precautions   Spine Precautions Patient Response Patient;Nonacceptance;Explanation;Handout;Reinforcement Needed   Role of Physical Therapist Patient Response Patient;Acceptance;Explanation;Verbal Demonstration;Reinforcement Needed   Additional Comments Likely to need re-education due to confusion   Physical Therapy Initial Treatment Plan    Treatment Plan  Bed Mobility;Gait Training;Neuro Re-Education / Balance;Self Care  / Home Evaluation;Therapeutic Activities;Therapeutic Exercise;Stair Training   Treatment Frequency 5 Times per Week   Duration Until Therapy Goals Met   Problem List    Problems Impaired Bed Mobility;Impaired Transfers;Impaired Ambulation;Impaired Balance;Decreased Activity Tolerance;Limited Knowledge of Post-Op Precautions;Safety Awareness Deficits / Cognition   Anticipated Discharge Equipment and Recommendations   DC Equipment Recommendations Unable to determine at this time   Discharge Recommendations Recommend post-acute placement for additional physical therapy services prior to discharge home  (Likely to progress to home, currently confused and high fall risk)   Interdisciplinary Plan of Care Collaboration   IDT Collaboration with  Nursing;Family / Caregiver   Patient Position at End of Therapy Seated;Chair Alarm On;Call Light within Reach;Tray Table within Reach;Phone within Reach   Collaboration Comments RN updated. Discussed with pt's  on the phone
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
## 2023-05-02 NOTE — DISCHARGE PLANNING
Care Transition Team Assessment    In the case of an emergency, pt's legal NOK is spouse, Alonso     RNCM met with pt at bedside and obtained the information used in this assessment. Pt verified accuracy of facesheet. Pt lives in a 2 story home with spouse.   Pt uses [facility]. Prior to current hospitalization, pt was completely independent in ADLS/IADLS. . Pt has a good support system. Pt denies any hx of substance use and denies any dx of mh.Does not use any DME.    Information Source:pt  Orientation Level: Disoriented to place, Disoriented to situation  Information Given By: Patient  Informant's Name: Kimmy  Who is responsible for making decisions for patient? : Patient         Elopement Risk  Legal Hold: No  Ambulatory or Self Mobile in Wheelchair: No-Not an Elopement Risk  Disoriented: No  Psychiatric Symptoms: None  History of Wandering: No  Elopement this Admit: No  Vocalizing Wanting to Leave: No  Displays Behaviors, Body Language Wanting to Leave: No-Not at Risk for Elopement  Elopement Risk: Not at Risk for Elopement    Interdisciplinary Discharge Planning  Does Admitting Nurse Feel This Could be a Complex Discharge?: No  Primary Care Physician: Eldon Ross  Lives with - Patient's Self Care Capacity: Spouse  Support Systems: Family Member(s)  Housing / Facility: 1 Story House  Do You Take your Prescribed Medications Regularly: Yes  Mobility Issues: No  Prior Services: None  Durable Medical Equipment: Not Applicable    Discharge Preparedness  What is your plan after discharge?: Uncertain - pending medical team collaboration  What are your discharge supports?: Spouse  Prior Functional Level: Ambulatory, Independent with Activities of Daily Living, Independent with Medication Management  Difficulity with ADLs: None  Difficulity with IADLs: None    Functional Assesment  Prior Functional Level: Ambulatory, Independent with Activities of Daily Living, Independent with Medication  Management    Finances  Prescription Coverage: Yes    Vision / Hearing Impairment  Right Eye Vision: Wears Glasses  Left Eye Vision: Wears Glasses              Domestic Abuse  Physical Abuse or Sexual Abuse: No  Verbal Abuse or Emotional Abuse: No  Possible Abuse/Neglect Reported to:: Not Applicable    Psychological Assessment  History of Substance Abuse: None  History of Psychiatric Problems: No         Anticipated Discharge Information  Discharge Disposition: D/T to home under HHA care in anticipation of covered skilled care (06)

## 2023-05-02 NOTE — CARE PLAN
The patient is Watcher - Medium risk of patient condition declining or worsening    Shift Goals  Clinical Goals: safety, abx, hemovac output    Progress made toward(s) clinical / shift goals:      Problem: Fall Risk  Goal: Patient will remain free from falls  Outcome: Progressing     Problem: Skin Integrity  Goal: Skin integrity is maintained or improved  Outcome: Progressing       Patient is not progressing towards the following goals:

## 2023-05-03 VITALS
HEIGHT: 61 IN | WEIGHT: 112.43 LBS | OXYGEN SATURATION: 93 % | BODY MASS INDEX: 21.23 KG/M2 | DIASTOLIC BLOOD PRESSURE: 68 MMHG | SYSTOLIC BLOOD PRESSURE: 132 MMHG | TEMPERATURE: 97.2 F | HEART RATE: 72 BPM | RESPIRATION RATE: 15 BRPM

## 2023-05-03 LAB
ANION GAP SERPL CALC-SCNC: 13 MMOL/L (ref 7–16)
BASOPHILS # BLD AUTO: 0.4 % (ref 0–1.8)
BASOPHILS # BLD: 0.04 K/UL (ref 0–0.12)
BUN SERPL-MCNC: 9 MG/DL (ref 8–22)
CALCIUM SERPL-MCNC: 8.8 MG/DL (ref 8.5–10.5)
CHLORIDE SERPL-SCNC: 99 MMOL/L (ref 96–112)
CO2 SERPL-SCNC: 24 MMOL/L (ref 20–33)
CREAT SERPL-MCNC: 0.5 MG/DL (ref 0.5–1.4)
EOSINOPHIL # BLD AUTO: 0.1 K/UL (ref 0–0.51)
EOSINOPHIL NFR BLD: 1.1 % (ref 0–6.9)
ERYTHROCYTE [DISTWIDTH] IN BLOOD BY AUTOMATED COUNT: 42.2 FL (ref 35.9–50)
GFR SERPLBLD CREATININE-BSD FMLA CKD-EPI: 96 ML/MIN/1.73 M 2
GLUCOSE SERPL-MCNC: 92 MG/DL (ref 65–99)
HCT VFR BLD AUTO: 36.9 % (ref 37–47)
HGB BLD-MCNC: 12.4 G/DL (ref 12–16)
IMM GRANULOCYTES # BLD AUTO: 0.05 K/UL (ref 0–0.11)
IMM GRANULOCYTES NFR BLD AUTO: 0.5 % (ref 0–0.9)
LYMPHOCYTES # BLD AUTO: 1.37 K/UL (ref 1–4.8)
LYMPHOCYTES NFR BLD: 14.9 % (ref 22–41)
MCH RBC QN AUTO: 29.7 PG (ref 27–33)
MCHC RBC AUTO-ENTMCNC: 33.6 G/DL (ref 33.6–35)
MCV RBC AUTO: 88.5 FL (ref 81.4–97.8)
MONOCYTES # BLD AUTO: 0.6 K/UL (ref 0–0.85)
MONOCYTES NFR BLD AUTO: 6.5 % (ref 0–13.4)
NEUTROPHILS # BLD AUTO: 7.01 K/UL (ref 2–7.15)
NEUTROPHILS NFR BLD: 76.6 % (ref 44–72)
NRBC # BLD AUTO: 0 K/UL
NRBC BLD-RTO: 0 /100 WBC
PLATELET # BLD AUTO: 291 K/UL (ref 164–446)
PMV BLD AUTO: 10.2 FL (ref 9–12.9)
POTASSIUM SERPL-SCNC: 3.8 MMOL/L (ref 3.6–5.5)
RBC # BLD AUTO: 4.17 M/UL (ref 4.2–5.4)
SODIUM SERPL-SCNC: 136 MMOL/L (ref 135–145)
WBC # BLD AUTO: 9.2 K/UL (ref 4.8–10.8)

## 2023-05-03 PROCEDURE — 85025 COMPLETE CBC W/AUTO DIFF WBC: CPT

## 2023-05-03 PROCEDURE — A9270 NON-COVERED ITEM OR SERVICE: HCPCS | Performed by: NURSE PRACTITIONER

## 2023-05-03 PROCEDURE — 36415 COLL VENOUS BLD VENIPUNCTURE: CPT

## 2023-05-03 PROCEDURE — 700102 HCHG RX REV CODE 250 W/ 637 OVERRIDE(OP): Performed by: NURSE PRACTITIONER

## 2023-05-03 PROCEDURE — 51798 US URINE CAPACITY MEASURE: CPT

## 2023-05-03 PROCEDURE — G0378 HOSPITAL OBSERVATION PER HR: HCPCS

## 2023-05-03 PROCEDURE — 80048 BASIC METABOLIC PNL TOTAL CA: CPT

## 2023-05-03 RX ADMIN — VENLAFAXINE HYDROCHLORIDE 150 MG: 75 CAPSULE, EXTENDED RELEASE ORAL at 05:37

## 2023-05-03 RX ADMIN — POLYETHYLENE GLYCOL 3350 1 PACKET: 17 POWDER, FOR SOLUTION ORAL at 05:36

## 2023-05-03 RX ADMIN — DOCUSATE SODIUM 100 MG: 100 CAPSULE, LIQUID FILLED ORAL at 05:37

## 2023-05-03 RX ADMIN — LORATADINE 10 MG: 10 TABLET ORAL at 05:37

## 2023-05-03 RX ADMIN — LEVOTHYROXINE SODIUM 50 MCG: 0.05 TABLET ORAL at 05:37

## 2023-05-03 ASSESSMENT — PAIN DESCRIPTION - PAIN TYPE: TYPE: SURGICAL PAIN

## 2023-05-03 NOTE — PROGRESS NOTES
Pt cleared for DC by MD, pt ambulating without assistance no need for PT/OT follow up.  at bedside, able to care for pt at home. Pt remains confused - meds/hospital delirium. Meds sent to [facility] pharmacy as requested, IV removed, tele sitter dc, VSS. Will discuss discharge instructions and remove dressing per MD

## 2023-05-03 NOTE — ANESTHESIA POSTPROCEDURE EVALUATION
Patient: Kimmy Diaz    Procedure Summary     Date: 05/01/23 Room / Location: [facility] 05 / SURGERY [facility]    Anesthesia Start: 1145 Anesthesia Stop: 1325    Procedure: POSTERIOR LEFT L4-5 LUMBAR INTERNAL DECOMPRESSION AND L5-S1 TRANSPEDICULAR DECOMPRESSION (Spine Lumbar) Diagnosis: (SPINAL STENOSIS OF LUMBAR REGION)    Surgeons: Nayan Reyes M.D. Responsible Provider: Tobey Gansert, M.D.    Anesthesia Type: general ASA Status: 3          Final Anesthesia Type: general  Last vitals  BP   Blood Pressure : (!) 142/78    Temp   36.8 °C (98.2 °F)    Pulse   79   Resp   16    SpO2   90 %      Anesthesia Post Evaluation    Patient location during evaluation: PACU  Patient participation: complete - patient participated  Level of consciousness: awake and alert    Airway patency: patent  Anesthetic complications: no  Cardiovascular status: hemodynamically stable  Respiratory status: acceptable  Hydration status: euvolemic    PONV: none          No notable events documented.     Nurse Pain Score: 1 (NPRS)

## 2023-05-03 NOTE — DISCHARGE SUMMARY
DATE OF ADMISSION:  05/01/2023     OPERATION PERFORMED:  Left L4-5 decompression with a L5-S1 transpedicular   decompression on 5/1/2023.     HOSPITAL COURSE:  On date of admission, operation was performed.    Postoperatively, the patient has had some postoperative confusion as well as   urinary retention.  A Mckeon was placed.  We are ambulating her and limiting   opiates. If improved tomorrow, she will discharge home.  Bilateral lower   extremity strength is grossly intact including bilateral IP quad, dorsi and   plantarflexion.  Her incision is clean, dry and intact.     DISCHARGE INSTRUCTIONS:  Given to patient in paper format.     DISCHARGE MEDICATIONS:  Percocet 5/325 one to two p.o. q. 6 hours p.r.n. pain,   #56, no refills.     Informed consent and medication agreement done over the phone in preop.     ASSESSMENT AND PLAN:  1.  Status post above delineated surgery with Dr. Reyes on 5/1/2023.  2.  The patient will follow up in our office at 4 weeks postop.  3.  The patient will avoid all NSAIDs for 1 week.     Should the patient have further questions or concerns, they will not hesitate   to give our office a call.        ______________________________  RONAN Rodriges        ______________________________  MD FELICITAS PUENTE/ALICIA    DD:  05/02/2023 15:58  DT:  05/02/2023 21:26    Job#:  [id number]

## 2023-05-03 NOTE — CARE PLAN
The patient is Watcher - Medium risk of patient condition declining or worsening    Shift Goals  Clinical Goals: pain control, safety  Patient Goals: rest    Progress made toward(s) clinical / shift goals:    Problem: Pain - Standard  Goal: Alleviation of pain or a reduction in pain to the patient’s comfort goal  Outcome: Progressing  Note: Pt states pain is relieved with current pain medication regimen. Pt medicated per MAR with + results, pt provided ice packs and pillows for comfort.      Problem: Fall Risk  Goal: Patient will remain free from falls  Outcome: Progressing  Note: Pt educated on importance of use of call light when needing assistance. Pt experiencing increasing confusion in past 24 hours, telesitter started at beginning of shift, lap belt applied for patient safety, call light and side table within reach, bed alarm on, hourly rounding in place.

## 2023-05-03 NOTE — PROGRESS NOTES
Pt transferred to room closer to nurses station, pt has steady gait, refuses to get in bed with alarm. RN educated pt on hospital policy and safety protocol. Pt sitting in chair at this time. Discussed with charge RN and tele sitter - pt steady on feet, okay to ambulate around room, risk for elopement.

## 2023-05-03 NOTE — CARE PLAN
The patient is Stable - Low risk of patient condition declining or worsening    Shift Goals  Clinical Goals: safety, fall risk/elopement, pain control, DC planning  Patient Goals: home    Progress made toward(s) clinical / shift goals:  pt to dc home    Patient is not progressing towards the following goals:      Problem: Pain - Standard  Goal: Alleviation of pain or a reduction in pain to the patient’s comfort goal  Description: Target End Date:  Prior to discharge or change in level of care    Document on Vitals flowsheet    1.  Document pain using the appropriate pain scale per order or unit policy  2.  Educate and implement non-pharmacologic comfort measures (i.e. relaxation, distraction, massage, cold/heat therapy, etc.)  3.  Pain management medications as ordered  4.  Reassess pain after pain med administration per policy  5.  If opiods administered assess patient's response to pain medication is appropriate per POSS sedation scale  6.  Follow pain management plan developed in collaboration with patient and interdisciplinary team (including palliative care or pain specialists if applicable)  Outcome: Met     Problem: Knowledge Deficit - Standard  Goal: Patient and family/care givers will demonstrate understanding of plan of care, disease process/condition, diagnostic tests and medications  Description: Target End Date:  1-3 days or as soon as patient condition allows    Document in Patient Education    1.  Patient and family/caregiver oriented to unit, equipment, visitation policy and means for communicating concern  2.  Complete/review Learning Assessment  3.  Assess knowledge level of disease process/condition, treatment plan, diagnostic tests and medications  4.  Explain disease process/condition, treatment plan, diagnostic tests and medications  Outcome: Met     Problem: Fall Risk  Goal: Patient will remain free from falls  Description: Target End Date:  Prior to discharge or change in level of  care    Document interventions on the [facility] Fall Risk Assessment    1.  Assess for fall risk factors  2.  Implement fall precautions  Outcome: Met     Problem: Skin Integrity  Goal: Skin integrity is maintained or improved  Description: Target End Date:  Prior to discharge or change in level of care    Document interventions on Skin Risk/Oskar flowsheet groups and corresponding LDA    1.  Assess and monitor skin integrity, appearance and/or temperature  2.  Assess risk factors for impaired skin integrity and/or pressures ulcers  3.  Implement precautions to protect skin integrity in collaboration with interdisciplinary team  4.  Implement pressure ulcer prevention protocol if at risk for skin breakdown  5.  Confirm wound care consult if at risk for skin breakdown  6.  Ensure patient use of pressure relieving devices  (Low air loss bed, waffle overlay, heel protectors, ROHO cushion, etc)  Outcome: Met

## 2023-05-03 NOTE — PROGRESS NOTES
Report received, pt Aox1, alert to self, pt is altered and confused. Night and day shift RN attempted to reorient pt. Pt ambulated to bathroom with standby assist, steady gait, tremor present, no assistive device needed. Pt denies any pain numbness or tingling. Dressing clean dry and intact.    Pt refusing to get back in bed, NA walked pt around unit, pt refusing assitance and states she wants to go home. Discussed plan of care with pt - per report pt confused at baseline but not this severe. Pt back in room, refusing to get in bed, walking around room, RN present, tele sitter at bedside. Will contact family

## 2023-05-22 RX ORDER — VENLAFAXINE HYDROCHLORIDE 150 MG/1
150 CAPSULE, EXTENDED RELEASE ORAL DAILY
Qty: 90 CAPSULE | Refills: 3 | Status: SHIPPED | OUTPATIENT
Start: 2023-05-22

## 2023-05-22 RX ORDER — VENLAFAXINE HYDROCHLORIDE 150 MG/1
150 CAPSULE, EXTENDED RELEASE ORAL DAILY
Qty: 90 CAPSULE | Refills: 3 | Status: SHIPPED | OUTPATIENT
Start: 2023-05-22 | End: 2023-05-22 | Stop reason: SDUPTHER

## 2023-06-26 DIAGNOSIS — M81.0 AGE-RELATED OSTEOPOROSIS WITHOUT CURRENT PATHOLOGICAL FRACTURE: ICD-10-CM

## 2023-06-27 ENCOUNTER — TELEPHONE (OUTPATIENT)
Dept: ONCOLOGY | Facility: MEDICAL CENTER | Age: 78
End: 2023-06-27
Payer: MEDICARE

## 2023-07-20 ENCOUNTER — OUTPATIENT INFUSION SERVICES (OUTPATIENT)
Dept: ONCOLOGY | Facility: MEDICAL CENTER | Age: 78
End: 2023-07-20
Attending: INTERNAL MEDICINE
Payer: MEDICARE

## 2023-07-20 VITALS
HEIGHT: 60 IN | WEIGHT: 109.57 LBS | BODY MASS INDEX: 21.51 KG/M2 | DIASTOLIC BLOOD PRESSURE: 79 MMHG | OXYGEN SATURATION: 94 % | SYSTOLIC BLOOD PRESSURE: 137 MMHG | HEART RATE: 77 BPM | RESPIRATION RATE: 18 BRPM | TEMPERATURE: 98 F

## 2023-07-20 DIAGNOSIS — M81.0 AGE-RELATED OSTEOPOROSIS WITHOUT CURRENT PATHOLOGICAL FRACTURE: ICD-10-CM

## 2023-07-20 LAB
CA-I BLD ISE-SCNC: 1.21 MMOL/L (ref 1.1–1.3)
CREAT BLD-MCNC: 0.7 MG/DL (ref 0.5–1.4)

## 2023-07-20 PROCEDURE — 36415 COLL VENOUS BLD VENIPUNCTURE: CPT

## 2023-07-20 PROCEDURE — 96372 THER/PROPH/DIAG INJ SC/IM: CPT

## 2023-07-20 PROCEDURE — 82565 ASSAY OF CREATININE: CPT

## 2023-07-20 PROCEDURE — 700111 HCHG RX REV CODE 636 W/ 250 OVERRIDE (IP): Mod: JZ,JG | Performed by: INTERNAL MEDICINE

## 2023-07-20 PROCEDURE — 82330 ASSAY OF CALCIUM: CPT

## 2023-07-20 RX ADMIN — DENOSUMAB 60 MG: 60 INJECTION SUBCUTANEOUS at 17:14

## 2023-07-20 ASSESSMENT — FIBROSIS 4 INDEX: FIB4 SCORE: 1.76

## 2023-07-21 NOTE — PROGRESS NOTES
Pt presents to infusion center for Prolia injection. Pt denies any current s/s of infection or recent or planned invasive dental surgery. Pt confirms they are taking appropriate amounts of Vit D at home and denies any s/s of hypocalcemia. Does not take calcium but is aware of recommendation. She reports she gets plenty from diet but will ask MD. Blood drawn from [facility] with 23G butterfly needle, gauze and coban dressing placed. ISTAT i-ca and creat run, results OK for treatment. Prolia given SubQ as ordered in back of left arm with no s/s of adverse reaction, bandaid placed. Emailed schedulers for next appt, left on foot to self care with .

## 2023-11-16 ENCOUNTER — APPOINTMENT (RX ONLY)
Dept: URBAN - METROPOLITAN AREA CLINIC 15 | Facility: CLINIC | Age: 78
Setting detail: DERMATOLOGY
End: 2023-11-16

## 2023-11-16 DIAGNOSIS — Z71.89 OTHER SPECIFIED COUNSELING: ICD-10-CM

## 2023-11-16 DIAGNOSIS — L82.1 OTHER SEBORRHEIC KERATOSIS: ICD-10-CM

## 2023-11-16 DIAGNOSIS — L82.0 INFLAMED SEBORRHEIC KERATOSIS: ICD-10-CM

## 2023-11-16 DIAGNOSIS — L81.4 OTHER MELANIN HYPERPIGMENTATION: ICD-10-CM

## 2023-11-16 DIAGNOSIS — D485 NEOPLASM OF UNCERTAIN BEHAVIOR OF SKIN: ICD-10-CM

## 2023-11-16 DIAGNOSIS — L57.8 OTHER SKIN CHANGES DUE TO CHRONIC EXPOSURE TO NONIONIZING RADIATION: ICD-10-CM | Status: INADEQUATELY CONTROLLED

## 2023-11-16 DIAGNOSIS — D18.0 HEMANGIOMA: ICD-10-CM

## 2023-11-16 DIAGNOSIS — D22 MELANOCYTIC NEVI: ICD-10-CM

## 2023-11-16 DIAGNOSIS — L57.0 ACTINIC KERATOSIS: ICD-10-CM

## 2023-11-16 PROBLEM — D39.8 NEOPLASM OF UNCERTAIN BEHAVIOR OF OTHER SPECIFIED FEMALE GENITAL ORGANS: Status: ACTIVE | Noted: 2023-11-16

## 2023-11-16 PROBLEM — D22.62 MELANOCYTIC NEVI OF LEFT UPPER LIMB, INCLUDING SHOULDER: Status: ACTIVE | Noted: 2023-11-16

## 2023-11-16 PROBLEM — D22.61 MELANOCYTIC NEVI OF RIGHT UPPER LIMB, INCLUDING SHOULDER: Status: ACTIVE | Noted: 2023-11-16

## 2023-11-16 PROBLEM — D22.5 MELANOCYTIC NEVI OF TRUNK: Status: ACTIVE | Noted: 2023-11-16

## 2023-11-16 PROBLEM — D22.71 MELANOCYTIC NEVI OF RIGHT LOWER LIMB, INCLUDING HIP: Status: ACTIVE | Noted: 2023-11-16

## 2023-11-16 PROBLEM — D18.01 HEMANGIOMA OF SKIN AND SUBCUTANEOUS TISSUE: Status: ACTIVE | Noted: 2023-11-16

## 2023-11-16 PROBLEM — D22.72 MELANOCYTIC NEVI OF LEFT LOWER LIMB, INCLUDING HIP: Status: ACTIVE | Noted: 2023-11-16

## 2023-11-16 PROCEDURE — ? COUNSELING

## 2023-11-16 PROCEDURE — ? LIQUID NITROGEN

## 2023-11-16 PROCEDURE — ? PRESCRIPTION MEDICATION MANAGEMENT

## 2023-11-16 PROCEDURE — 17003 DESTRUCT PREMALG LES 2-14: CPT | Mod: 59

## 2023-11-16 PROCEDURE — 17110 DESTRUCTION B9 LES UP TO 14: CPT

## 2023-11-16 PROCEDURE — ? ADDITIONAL NOTES

## 2023-11-16 PROCEDURE — 17000 DESTRUCT PREMALG LESION: CPT | Mod: 59

## 2023-11-16 PROCEDURE — 99214 OFFICE O/P EST MOD 30 MIN: CPT | Mod: 25

## 2023-11-16 ASSESSMENT — LOCATION ZONE DERM
LOCATION ZONE: ARM
LOCATION ZONE: LIP
LOCATION ZONE: VULVA
LOCATION ZONE: FACE
LOCATION ZONE: SCALP
LOCATION ZONE: LEG
LOCATION ZONE: TRUNK
LOCATION ZONE: EAR

## 2023-11-16 ASSESSMENT — LOCATION DETAILED DESCRIPTION DERM
LOCATION DETAILED: LEFT ANTERIOR DISTAL UPPER ARM
LOCATION DETAILED: LEFT SUPERIOR MEDIAL FOREHEAD
LOCATION DETAILED: LEFT VENTRAL DISTAL FOREARM
LOCATION DETAILED: LEFT PROXIMAL DORSAL FOREARM
LOCATION DETAILED: RIGHT VENTRAL DISTAL FOREARM
LOCATION DETAILED: RIGHT VENTRAL PROXIMAL FOREARM
LOCATION DETAILED: LEFT INFERIOR UPPER BACK
LOCATION DETAILED: SUPERIOR THORACIC SPINE
LOCATION DETAILED: RIGHT SUPERIOR LATERAL MIDBACK
LOCATION DETAILED: LEFT PROXIMAL CALF
LOCATION DETAILED: LEFT ANTIHELIX
LOCATION DETAILED: RIGHT MID-UPPER BACK
LOCATION DETAILED: PHILTRUM
LOCATION DETAILED: RIGHT ANTERIOR DISTAL UPPER ARM
LOCATION DETAILED: SUPERIOR MID FOREHEAD
LOCATION DETAILED: LEFT ANTERIOR PROXIMAL THIGH
LOCATION DETAILED: LEFT DISTAL POSTERIOR THIGH
LOCATION DETAILED: LEFT SUPERIOR FOREHEAD
LOCATION DETAILED: RIGHT MEDIAL INFERIOR CHEST
LOCATION DETAILED: RIGHT LABIA MINORA
LOCATION DETAILED: LEFT CENTRAL MALAR CHEEK
LOCATION DETAILED: LEFT SUPERIOR UPPER BACK
LOCATION DETAILED: RIGHT PROXIMAL DORSAL FOREARM
LOCATION DETAILED: RIGHT DISTAL POSTERIOR THIGH
LOCATION DETAILED: LEFT DISTAL PRETIBIAL REGION
LOCATION DETAILED: LEFT CENTRAL FRONTAL SCALP
LOCATION DETAILED: RIGHT PROXIMAL CALF
LOCATION DETAILED: RIGHT MEDIAL FOREHEAD
LOCATION DETAILED: RIGHT ANTERIOR PROXIMAL THIGH

## 2023-11-16 ASSESSMENT — LOCATION SIMPLE DESCRIPTION DERM
LOCATION SIMPLE: RIGHT LOWER BACK
LOCATION SIMPLE: RIGHT FOREHEAD
LOCATION SIMPLE: LEFT UPPER BACK
LOCATION SIMPLE: LEFT THIGH
LOCATION SIMPLE: RIGHT FOREARM
LOCATION SIMPLE: UPPER BACK
LOCATION SIMPLE: LEFT EAR
LOCATION SIMPLE: RIGHT UPPER BACK
LOCATION SIMPLE: LEFT FOREHEAD
LOCATION SIMPLE: RIGHT THIGH
LOCATION SIMPLE: SUPERIOR FOREHEAD
LOCATION SIMPLE: VULVA
LOCATION SIMPLE: LEFT PRETIBIAL REGION
LOCATION SIMPLE: CHEST
LOCATION SIMPLE: RIGHT POSTERIOR THIGH
LOCATION SIMPLE: LEFT CALF
LOCATION SIMPLE: LEFT CHEEK
LOCATION SIMPLE: UPPER LIP
LOCATION SIMPLE: LEFT UPPER ARM
LOCATION SIMPLE: LEFT FOREARM
LOCATION SIMPLE: LEFT POSTERIOR THIGH
LOCATION SIMPLE: RIGHT UPPER ARM
LOCATION SIMPLE: RIGHT CALF
LOCATION SIMPLE: LEFT SCALP

## 2023-11-16 NOTE — PROCEDURE: LIQUID NITROGEN
Spray Paint Technique: No
Medical Necessity Clause: This procedure was medically necessary because the lesions that were treated were:
Consent: The patient's consent was obtained including but not limited to risks of crusting, scabbing, blistering, scarring, darker or lighter pigmentary change, recurrence, incomplete removal and infection.
Pared With?: curette
Show Topical Anesthesia Variable?: Yes
Detail Level: Detailed
Post-Care Instructions: I reviewed with the patient in detail post-care instructions. Patient is to wear sunprotection, and avoid picking at any of the treated lesions. Pt may apply Vaseline to crusted or scabbing areas.
Medical Necessity Information: It is in your best interest to select a reason for this procedure from the list below. All of these items fulfill various CMS LCD requirements except the new and changing color options.
Number Of Freeze-Thaw Cycles: 2 freeze-thaw cycles
Spray Paint Text: The liquid nitrogen was applied to the skin utilizing a spray paint frosting technique.
Duration Of Freeze Thaw-Cycle (Seconds): 0

## 2023-11-16 NOTE — PROCEDURE: PRESCRIPTION MEDICATION MANAGEMENT
Detail Level: Zone
Render In Strict Bullet Format?: No
plan to do 5-fu/calcipo bid x 5 d to forehead/temples after xmas.

## 2023-11-16 NOTE — PROCEDURE: ADDITIONAL NOTES
Detail Level: Simple
Additional Notes: Pt complains of itching and irritation, upon exam appears to be pelvic prolapse, will refer to gynecology
Render Risk Assessment In Note?: no

## 2023-12-27 ENCOUNTER — OFFICE VISIT (OUTPATIENT)
Dept: INTERNAL MEDICINE | Facility: IMAGING CENTER | Age: 78
End: 2023-12-27
Payer: MEDICARE

## 2023-12-27 VITALS
HEART RATE: 72 BPM | TEMPERATURE: 97.5 F | BODY MASS INDEX: 20.35 KG/M2 | OXYGEN SATURATION: 95 % | RESPIRATION RATE: 12 BRPM | SYSTOLIC BLOOD PRESSURE: 110 MMHG | WEIGHT: 105 LBS | DIASTOLIC BLOOD PRESSURE: 70 MMHG

## 2023-12-27 DIAGNOSIS — G20.A2 PARKINSON'S DISEASE WITHOUT DYSKINESIA, WITH FLUCTUATING MANIFESTATIONS (HCC): ICD-10-CM

## 2023-12-27 DIAGNOSIS — F41.9 ANXIETY: ICD-10-CM

## 2023-12-27 PROCEDURE — 3074F SYST BP LT 130 MM HG: CPT | Performed by: INTERNAL MEDICINE

## 2023-12-27 PROCEDURE — 99214 OFFICE O/P EST MOD 30 MIN: CPT | Performed by: INTERNAL MEDICINE

## 2023-12-27 PROCEDURE — 3078F DIAST BP <80 MM HG: CPT | Performed by: INTERNAL MEDICINE

## 2023-12-27 ASSESSMENT — FIBROSIS 4 INDEX: FIB4 SCORE: 1.76

## 2023-12-28 NOTE — PROGRESS NOTES
Chief Complaint   Patient presents with    Neurological Problem    Anxiety       HISTORY OF THE PRESENT ILLNESS: Patient is a 78 y.o. female.     Patient comes in with her  to discuss tremor and anxiety.  He and she both report that at home her tremulous symptoms are minimal.  They seem to get worse with anxiety.  She was seen by neurology.  They report she was started on medication but could not tolerate due to GI side effects.  They are uncertain but think it was carbidopa/levodopa.  I do not have the notes from neurology due to review.  They have a friend who is a radiologist that discussed possible Zack scan.  Patient remains on Effexor.  She thinks this is been helpful with both anxiety and depression but has symptoms of both.    Allergies: Augmentin [amoxicillin-pot clavulanate], Iodine, Mercury, and Other environmental    Current Outpatient Medications Ordered in Epic   Medication Sig Dispense Refill    denosumab (PROLIA) 60 MG/ML Solution Prefilled Syringe injection Inject 1 mL under the skin every 6 months. 1 mL 0    venlafaxine (EFFEXOR-XR) 150 MG extended-release capsule Take 1 Capsule by mouth every day. 90 Capsule 3    fluticasone (FLONASE) 50 MCG/ACT nasal spray Administer 1 Spray into affected nostril(S) every day.      loratadine (CLARITIN) 10 MG Tab Take 10 mg by mouth every day.      SYNTHROID 50 MCG Tab Take 1 tablet by mouth once daily 90 Tablet 3    ondansetron (ZOFRAN ODT) 4 MG TABLET DISPERSIBLE Take 1 Tablet by mouth every 6 hours as needed for Nausea. 10 Tablet 0     No current Epic-ordered facility-administered medications on file.       Past medical history, social history and family history were reviewed from chart today    Review of systems: Per HPI.    All others negative.     Exam: /70 (BP Location: Left arm, Patient Position: Sitting, BP Cuff Size: Adult)   Pulse 72   Temp 36.4 °C (97.5 °F) (Temporal)   Resp 12   Wt 47.6 kg (105 lb)   SpO2 95%   General: Friendly and  appropriate.  HEENT: Grossly normal. Oral cavity is pink and moist.   Pulmonary: Clear with good breath sounds. Normal effort.  Cardiovascular: Regular. Carotid and radial pulses are intact.  Abdomen: Soft, nontender, nondistended. Spleen and liver are not enlarged.  Neurologic: Patient is very tremulous.  Symptoms are primarily on the right.  She has minimal facial expressions.  Her voice amplitude is low.  Gait was not assessed.      Diagnosis:  1. Parkinson's disease without dyskinesia, with fluctuating manifestations        2. Anxiety          Discussed with patient and her  that I think it is likely that she has Parkinson's.  I recommended that we pursue the Zack scan as further evidence that she has the disease.  I recommended trying different medication.  At this time I am unaware what she has started.  I also offered referral to movement specialists locally or at tertiary facility.  At this time they would like to defer.    We also discussed her anxiety.  Overall she has been doing better on Effexor.  I recommend no change in medication at this time.        My total time spent caring for the patient on the day of the encounter was  greater than 30 minutes.   This includes obtaining history, reviewing chart, physical exam, patient education, reviewing outside records, placing orders, interpreting tests and coordinating care.    Portions of this note were completed using voice recognition software (Dragon Naturally speaking software) . Occasional transcription errors may have escaped proof reading. I have made every reasonable attempt to correct obvious errors, but I expect that there are errors of grammar and possibly content that I did not discover before finalizing the note.

## 2024-01-10 ENCOUNTER — APPOINTMENT (RX ONLY)
Dept: URBAN - METROPOLITAN AREA CLINIC 15 | Facility: CLINIC | Age: 79
Setting detail: DERMATOLOGY
End: 2024-01-10

## 2024-01-10 DIAGNOSIS — L56.5 DISSEMINATED SUPERFICIAL ACTINIC POROKERATOSIS (DSAP): ICD-10-CM

## 2024-01-10 DIAGNOSIS — L57.8 OTHER SKIN CHANGES DUE TO CHRONIC EXPOSURE TO NONIONIZING RADIATION: ICD-10-CM

## 2024-01-10 DIAGNOSIS — L81.8 OTHER SPECIFIED DISORDERS OF PIGMENTATION: ICD-10-CM

## 2024-01-10 DIAGNOSIS — L57.0 ACTINIC KERATOSIS: ICD-10-CM

## 2024-01-10 PROCEDURE — 17000 DESTRUCT PREMALG LESION: CPT

## 2024-01-10 PROCEDURE — ? COUNSELING

## 2024-01-10 PROCEDURE — ? ADDITIONAL NOTES

## 2024-01-10 PROCEDURE — ? LIQUID NITROGEN

## 2024-01-10 PROCEDURE — 17003 DESTRUCT PREMALG LES 2-14: CPT

## 2024-01-10 PROCEDURE — 99213 OFFICE O/P EST LOW 20 MIN: CPT | Mod: 25

## 2024-01-10 ASSESSMENT — LOCATION ZONE DERM
LOCATION ZONE: ARM
LOCATION ZONE: EAR
LOCATION ZONE: NOSE
LOCATION ZONE: LEG
LOCATION ZONE: SCALP
LOCATION ZONE: FACE

## 2024-01-10 ASSESSMENT — LOCATION DETAILED DESCRIPTION DERM
LOCATION DETAILED: LEFT DISTAL DORSAL FOREARM
LOCATION DETAILED: RIGHT INFERIOR FOREHEAD
LOCATION DETAILED: NASAL SUPRATIP
LOCATION DETAILED: LEFT PROXIMAL DORSAL FOREARM
LOCATION DETAILED: LEFT DISTAL PRETIBIAL REGION
LOCATION DETAILED: LEFT FOREHEAD
LOCATION DETAILED: LEFT CENTRAL FRONTAL SCALP
LOCATION DETAILED: LEFT LATERAL MALAR CHEEK
LOCATION DETAILED: RIGHT SUPERIOR FOREHEAD
LOCATION DETAILED: LEFT LATERAL MANDIBULAR CHEEK
LOCATION DETAILED: LEFT ANTITRAGUS
LOCATION DETAILED: RIGHT INFERIOR CENTRAL MALAR CHEEK
LOCATION DETAILED: RIGHT INFERIOR LATERAL FOREHEAD
LOCATION DETAILED: SUPERIOR MID FOREHEAD

## 2024-01-10 ASSESSMENT — LOCATION SIMPLE DESCRIPTION DERM
LOCATION SIMPLE: LEFT EAR
LOCATION SIMPLE: LEFT SCALP
LOCATION SIMPLE: RIGHT CHEEK
LOCATION SIMPLE: LEFT FOREHEAD
LOCATION SIMPLE: LEFT PRETIBIAL REGION
LOCATION SIMPLE: SUPERIOR FOREHEAD
LOCATION SIMPLE: LEFT CHEEK
LOCATION SIMPLE: RIGHT FOREHEAD
LOCATION SIMPLE: LEFT FOREARM
LOCATION SIMPLE: NOSE

## 2024-01-10 NOTE — PROCEDURE: LIQUID NITROGEN
Duration Of Freeze Thaw-Cycle (Seconds): 6
Post-Care Instructions: I reviewed with the patient in detail post-care instructions. Patient is to wear sunprotection, and avoid picking at any of the treated lesions. Pt may apply Vaseline to crusted or scabbing areas.
Number Of Freeze-Thaw Cycles: 1 freeze-thaw cycle
Render Post-Care Instructions In Note?: no
Detail Level: Detailed
Consent: The patient's consent was obtained including but not limited to risks of crusting, scabbing, blistering, scarring, darker or lighter pigmentary change, recurrence, incomplete removal and infection.
Show Aperture Variable?: Yes

## 2024-01-10 NOTE — PROCEDURE: ADDITIONAL NOTES
Detail Level: Simple
Render Risk Assessment In Note?: no
Additional Notes: Pt is going to [facility] next week for 1 month, will have another after they get back as they would like to go over 5fu instructions again before starting\\nRecommended Heliocare supplements\\nPt has generalized anxiety and is anxious about being in the sun
Additional Notes: Pt will treat with 5fu

## 2024-01-10 NOTE — PROCEDURE: MIPS QUALITY
Quality 130: Documentation Of Current Medications In The Medical Record: Current Medications Documented
Quality 226: Preventive Care And Screening: Tobacco Use: Screening And Cessation Intervention: Patient screened for tobacco use and is an ex/non-smoker
Detail Level: Detailed
Quality 111:Pneumonia Vaccination Status For Older Adults: Patient received any pneumococcal conjugate or polysaccharide vaccine on or after their 60th birthday and before the end of the measurement period

## 2024-01-15 DIAGNOSIS — E03.9 HYPOTHYROIDISM, UNSPECIFIED TYPE: ICD-10-CM

## 2024-01-15 RX ORDER — LEVOTHYROXINE SODIUM 50 MCG
TABLET ORAL
Qty: 90 TABLET | Refills: 3 | Status: SHIPPED | OUTPATIENT
Start: 2024-01-15

## 2024-02-28 ENCOUNTER — OFFICE VISIT (OUTPATIENT)
Dept: INTERNAL MEDICINE | Facility: IMAGING CENTER | Age: 79
End: 2024-02-28
Payer: MEDICARE

## 2024-02-28 VITALS
DIASTOLIC BLOOD PRESSURE: 70 MMHG | TEMPERATURE: 97.7 F | HEART RATE: 86 BPM | OXYGEN SATURATION: 96 % | BODY MASS INDEX: 20.15 KG/M2 | RESPIRATION RATE: 12 BRPM | SYSTOLIC BLOOD PRESSURE: 120 MMHG | WEIGHT: 104 LBS

## 2024-02-28 DIAGNOSIS — F41.9 ANXIETY: ICD-10-CM

## 2024-02-28 DIAGNOSIS — R45.89 SYMPTOMS OF DEPRESSION: ICD-10-CM

## 2024-02-28 DIAGNOSIS — G20.A2 PARKINSON'S DISEASE WITHOUT DYSKINESIA, WITH FLUCTUATING MANIFESTATIONS (HCC): ICD-10-CM

## 2024-02-28 PROCEDURE — 3074F SYST BP LT 130 MM HG: CPT | Performed by: INTERNAL MEDICINE

## 2024-02-28 PROCEDURE — 3078F DIAST BP <80 MM HG: CPT | Performed by: INTERNAL MEDICINE

## 2024-02-28 PROCEDURE — 99214 OFFICE O/P EST MOD 30 MIN: CPT | Performed by: INTERNAL MEDICINE

## 2024-02-28 RX ORDER — VENLAFAXINE HYDROCHLORIDE 75 MG/1
75 CAPSULE, EXTENDED RELEASE ORAL DAILY
Qty: 30 CAPSULE | Refills: 3 | Status: SHIPPED | OUTPATIENT
Start: 2024-02-28

## 2024-02-28 ASSESSMENT — FIBROSIS 4 INDEX: FIB4 SCORE: 1.78

## 2024-02-28 NOTE — PROGRESS NOTES
Chief Complaint   Patient presents with    Follow-Up       HISTORY OF THE PRESENT ILLNESS: Patient is a 79 y.o. female.     Patient and  recently returned from [place].  No issues while they are away.    We discussed her anxiety.  She feels that she is doing better.  Her  agrees.  She has experienced some depressive symptoms.  She thinks is related to life stress including possible diagnosis of Parkinson's as well as family stress from her sister and brother.  She has been tolerating the venlafaxine.    We discussed her tremor.  She likely has Parkinson's due to disease.  We have previously discussed a Zack scan.  She did not pursue this further.  She was briefly tried on carbidopa/levodopa but discontinued due to GI side effects.  I spoke with her neurologist on the phone who agreed with the diagnosis and agreed that treatment would be indicated.  During today's visit we discussed the typical signs of Parkinson's including short based gait, lack of facial expression, low amplitude voice, small writing, tremor.  Patient and her  agree that she has voice, writing and tremor issues.    We also reviewed her recent mammogram and ultrasound.  She has a history of dense breast.    Assessment/plan:    Try increasing Effexor to 225 mg.  Discussed starting treatment for Parkinson's.  She may be a candidate for Requip or similar.  She did not tolerate carbidopa/levodopa.    Allergies: Augmentin [amoxicillin-pot clavulanate], Iodine, Mercury, and Other environmental    Current Outpatient Medications Ordered in Epic   Medication Sig Dispense Refill    venlafaxine XR (EFFEXOR XR) 75 MG CAPSULE SR 24 HR Take 1 Capsule by mouth every day. 30 Capsule 3    SYNTHROID 50 MCG Tab Take 1 tablet by mouth once daily 90 Tablet 3    denosumab (PROLIA) 60 MG/ML Solution Prefilled Syringe injection Inject 1 mL under the skin every 6 months. 1 mL 0    venlafaxine (EFFEXOR-XR) 150 MG extended-release capsule Take 1 Capsule by  mouth every day. 90 Capsule 3    fluticasone (FLONASE) 50 MCG/ACT nasal spray Administer 1 Spray into affected nostril(S) every day.      loratadine (CLARITIN) 10 MG Tab Take 10 mg by mouth every day.      ondansetron (ZOFRAN ODT) 4 MG TABLET DISPERSIBLE Take 1 Tablet by mouth every 6 hours as needed for Nausea. 10 Tablet 0     No current Epic-ordered facility-administered medications on file.       Past medical history, social history and family history were reviewed from chart today    Review of systems: Per HPI.    All others negative.     Exam: /70 (BP Location: Left arm, Patient Position: Sitting, BP Cuff Size: Adult)   Pulse 86   Temp 36.5 °C (97.7 °F) (Temporal)   Resp 12   Wt 47.2 kg (104 lb)   SpO2 96%   General: Well-appearing. Well-developed. No signs of distress.  Pulmonary: Clear with good breath sounds. Normal effort.  Cardiovascular: Regular. Carotid and radial pulses are intact.  Abdomen: Soft, nontender, nondistended. Spleen and liver are not enlarged.  Neurologic: Cranial nerves II through XII are grossly normal, alert and oriented x3.  Patient has obvious tremor in her right hand and arm.  Minimal facial expressions.  Low amplitude voice.  Gait was not assessed.      Diagnosis:  1. Anxiety        2. Symptoms of depression        3. Parkinson's disease without dyskinesia, with fluctuating manifestations            As above    My total time spent caring for the patient on the day of the encounter was  greater than 30 minutes.   This includes obtaining history, reviewing chart, physical exam, patient education, reviewing outside records, placing orders, interpreting tests and coordinating care.    Portions of this note were completed using voice recognition software (Dragon Naturally speaking software) . Occasional transcription errors may have escaped proof reading. I have made every reasonable attempt to correct obvious errors, but I expect that there are errors of grammar and possibly  content that I did not discover before finalizing the note.

## 2024-03-04 ENCOUNTER — OUTPATIENT INFUSION SERVICES (OUTPATIENT)
Dept: ONCOLOGY | Facility: MEDICAL CENTER | Age: 79
End: 2024-03-04
Attending: INTERNAL MEDICINE
Payer: MEDICARE

## 2024-03-04 VITALS
BODY MASS INDEX: 20.02 KG/M2 | DIASTOLIC BLOOD PRESSURE: 81 MMHG | OXYGEN SATURATION: 95 % | SYSTOLIC BLOOD PRESSURE: 132 MMHG | WEIGHT: 106.04 LBS | RESPIRATION RATE: 18 BRPM | HEIGHT: 61 IN | HEART RATE: 86 BPM | TEMPERATURE: 99 F

## 2024-03-04 DIAGNOSIS — M81.0 AGE-RELATED OSTEOPOROSIS WITHOUT CURRENT PATHOLOGICAL FRACTURE: ICD-10-CM

## 2024-03-04 LAB
CA-I BLD ISE-SCNC: 1.18 MMOL/L (ref 1.1–1.3)
CREAT BLD-MCNC: 0.8 MG/DL (ref 0.5–1.4)

## 2024-03-04 PROCEDURE — 82565 ASSAY OF CREATININE: CPT

## 2024-03-04 PROCEDURE — 82330 ASSAY OF CALCIUM: CPT

## 2024-03-04 PROCEDURE — 96372 THER/PROPH/DIAG INJ SC/IM: CPT

## 2024-03-04 PROCEDURE — 36415 COLL VENOUS BLD VENIPUNCTURE: CPT

## 2024-03-04 PROCEDURE — 700111 HCHG RX REV CODE 636 W/ 250 OVERRIDE (IP): Mod: JZ,JG | Performed by: INTERNAL MEDICINE

## 2024-03-04 RX ADMIN — DENOSUMAB 60 MG: 60 INJECTION SUBCUTANEOUS at 14:29

## 2024-03-04 ASSESSMENT — FIBROSIS 4 INDEX: FIB4 SCORE: 1.78

## 2024-03-04 NOTE — PROGRESS NOTES
Patient to [facility] for Prolia injection. Istat Ca+ and Cr performed. Patient meets parameters for injection. Prolia injected into left back of arm. Emailed scheduling for future appointment. Patient to home in care of spouse.

## 2024-03-05 ENCOUNTER — APPOINTMENT (RX ONLY)
Dept: URBAN - METROPOLITAN AREA CLINIC 15 | Facility: CLINIC | Age: 79
Setting detail: DERMATOLOGY
End: 2024-03-05

## 2024-03-05 DIAGNOSIS — L57.8 OTHER SKIN CHANGES DUE TO CHRONIC EXPOSURE TO NONIONIZING RADIATION: ICD-10-CM

## 2024-03-05 PROCEDURE — ? PRESCRIPTION

## 2024-03-05 PROCEDURE — ? PRESCRIPTION MEDICATION MANAGEMENT

## 2024-03-05 PROCEDURE — ? COUNSELING

## 2024-03-05 PROCEDURE — ? ADDITIONAL NOTES

## 2024-03-05 PROCEDURE — 99214 OFFICE O/P EST MOD 30 MIN: CPT

## 2024-03-05 ASSESSMENT — LOCATION DETAILED DESCRIPTION DERM: LOCATION DETAILED: SUPERIOR MID FOREHEAD

## 2024-03-05 ASSESSMENT — LOCATION ZONE DERM: LOCATION ZONE: FACE

## 2024-03-05 ASSESSMENT — LOCATION SIMPLE DESCRIPTION DERM: LOCATION SIMPLE: SUPERIOR FOREHEAD

## 2024-03-05 NOTE — PROCEDURE: ADDITIONAL NOTES
Detail Level: Simple
Render Risk Assessment In Note?: no
Additional Notes: Pt has returned from [place] and presents today to initiate field therapy treatment with 5fu

## 2024-03-05 NOTE — PROCEDURE: PRESCRIPTION MEDICATION MANAGEMENT
Render In Strict Bullet Format?: No
Initiate Treatment: 5fu calcipo BID x 5 days face x 7 days spot on left arm
Detail Level: Zone

## 2024-03-28 ENCOUNTER — APPOINTMENT (OUTPATIENT)
Dept: INTERNAL MEDICINE | Facility: IMAGING CENTER | Age: 79
End: 2024-03-28
Payer: MEDICARE

## 2024-04-25 ENCOUNTER — OFFICE VISIT (OUTPATIENT)
Dept: INTERNAL MEDICINE | Facility: IMAGING CENTER | Age: 79
End: 2024-04-25
Payer: MEDICARE

## 2024-04-25 VITALS
DIASTOLIC BLOOD PRESSURE: 70 MMHG | HEART RATE: 74 BPM | BODY MASS INDEX: 19.46 KG/M2 | OXYGEN SATURATION: 96 % | TEMPERATURE: 97.8 F | SYSTOLIC BLOOD PRESSURE: 120 MMHG | WEIGHT: 103 LBS | RESPIRATION RATE: 12 BRPM

## 2024-04-25 DIAGNOSIS — F41.9 ANXIETY: ICD-10-CM

## 2024-04-25 DIAGNOSIS — M81.0 AGE-RELATED OSTEOPOROSIS WITHOUT CURRENT PATHOLOGICAL FRACTURE: ICD-10-CM

## 2024-04-25 DIAGNOSIS — G20.A2 PARKINSON'S DISEASE WITHOUT DYSKINESIA, WITH FLUCTUATING MANIFESTATIONS (HCC): ICD-10-CM

## 2024-04-25 PROCEDURE — 3074F SYST BP LT 130 MM HG: CPT | Performed by: INTERNAL MEDICINE

## 2024-04-25 PROCEDURE — 99214 OFFICE O/P EST MOD 30 MIN: CPT | Performed by: INTERNAL MEDICINE

## 2024-04-25 PROCEDURE — 3078F DIAST BP <80 MM HG: CPT | Performed by: INTERNAL MEDICINE

## 2024-04-25 RX ORDER — ROPINIROLE 0.25 MG/1
0.25 TABLET, FILM COATED ORAL 2 TIMES DAILY
Qty: 60 TABLET | Refills: 3 | Status: SHIPPED | OUTPATIENT
Start: 2024-04-25

## 2024-04-25 ASSESSMENT — FIBROSIS 4 INDEX: FIB4 SCORE: 1.78

## 2024-04-25 NOTE — PROGRESS NOTES
Chief Complaint   Patient presents with    Other     Parkinson's          HISTORY OF THE PRESENT ILLNESS: Patient is a 79 y.o. female.     Patient comes in for follow-up on Parkinson's and anxiety.  She feels that the 150 mg dose of venlafaxine was most effective.  The 225 mg caused her to feel jittery.  She has ongoing tremor.  She does not think it is worse.  She has noticed that her memory is poor.  No falls.  Her  feels that her short-term memory is okay.    Allergies: Augmentin [amoxicillin-pot clavulanate], Iodine, Mercury, and Other environmental    Current Outpatient Medications Ordered in Epic   Medication Sig Dispense Refill    ROPINIRole (REQUIP) 0.25 MG Tab Take 1 Tablet by mouth 2 times a day. 60 Tablet 3    SYNTHROID 50 MCG Tab Take 1 tablet by mouth once daily 90 Tablet 3    denosumab (PROLIA) 60 MG/ML Solution Prefilled Syringe injection Inject 1 mL under the skin every 6 months. 1 mL 0    venlafaxine (EFFEXOR-XR) 150 MG extended-release capsule Take 1 Capsule by mouth every day. 90 Capsule 3    fluticasone (FLONASE) 50 MCG/ACT nasal spray Administer 1 Spray into affected nostril(S) every day.      loratadine (CLARITIN) 10 MG Tab Take 10 mg by mouth every day.      ondansetron (ZOFRAN ODT) 4 MG TABLET DISPERSIBLE Take 1 Tablet by mouth every 6 hours as needed for Nausea. 10 Tablet 0     No current Epic-ordered facility-administered medications on file.       Past medical history, social history and family history were reviewed from chart today    Review of systems: Per HPI.    All others negative.     Exam: /70 (BP Location: Left arm, Patient Position: Sitting, BP Cuff Size: Adult)   Pulse 74   Temp 36.6 °C (97.8 °F) (Temporal)   Resp 12   Wt 46.7 kg (103 lb)   SpO2 96%   General: Well-appearing. Well-developed. No signs of distress.  HEENT: Grossly normal. Oral cavity is pink and moist.   Neck: Supple without JVD or bruit.  Pulmonary: Clear with good breath sounds. Normal  effort.  Cardiovascular: Regular. Carotid and radial pulses are intact.  Abdomen: Soft, nontender, nondistended. Spleen and liver are not enlarged.  Neurologic: Resting tremor in both lower extremities, worse on right.  Minimal facial expressions.  Voice amplitude is normal today.  Gait not assessed.      Diagnosis:  1. Parkinson's disease without dyskinesia, with fluctuating manifestations (HCC)        2. Anxiety        3. Age-related osteoporosis without current pathological fracture          Trial of Requip for Parkinson's.  She failed carbidopa levodopa  Continue venlafaxine 150 mg.  Continue Prolia    Follow-up in 30 days    My total time spent caring for the patient on the day of the encounter was  greater than 30 minutes minutes.   This includes obtaining history, reviewing chart, physical exam, patient education, reviewing outside records, placing orders, interpreting tests and coordinating care.    Portions of this note were completed using voice recognition software (Dragon Naturally speaking software) . Occasional transcription errors may have escaped proof reading. I have made every reasonable attempt to correct obvious errors, but I expect that there are errors of grammar and possibly content that I did not discover before finalizing the note.

## 2024-05-21 ENCOUNTER — APPOINTMENT (OUTPATIENT)
Dept: INTERNAL MEDICINE | Facility: IMAGING CENTER | Age: 79
End: 2024-05-21
Payer: MEDICARE

## 2024-05-21 ENCOUNTER — HOSPITAL ENCOUNTER (OUTPATIENT)
Facility: MEDICAL CENTER | Age: 79
End: 2024-05-21
Attending: INTERNAL MEDICINE
Payer: MEDICARE

## 2024-05-21 DIAGNOSIS — M81.0 AGE-RELATED OSTEOPOROSIS WITHOUT CURRENT PATHOLOGICAL FRACTURE: ICD-10-CM

## 2024-05-21 DIAGNOSIS — D69.6 THROMBOCYTOPENIA, UNSPECIFIED (HCC): ICD-10-CM

## 2024-05-21 DIAGNOSIS — E03.9 HYPOTHYROIDISM, UNSPECIFIED TYPE: ICD-10-CM

## 2024-05-21 DIAGNOSIS — E78.00 PURE HYPERCHOLESTEROLEMIA: ICD-10-CM

## 2024-05-21 DIAGNOSIS — R35.0 URINE FREQUENCY: ICD-10-CM

## 2024-05-21 LAB
25(OH)D3 SERPL-MCNC: 43 NG/ML (ref 30–100)
ALBUMIN SERPL BCP-MCNC: 4.4 G/DL (ref 3.2–4.9)
ALBUMIN/GLOB SERPL: 1.6 G/DL
ALP SERPL-CCNC: 57 U/L (ref 30–99)
ALT SERPL-CCNC: 14 U/L (ref 2–50)
AMORPH CRY #/AREA URNS HPF: PRESENT /HPF
ANION GAP SERPL CALC-SCNC: 11 MMOL/L (ref 7–16)
APPEARANCE UR: CLEAR
AST SERPL-CCNC: 23 U/L (ref 12–45)
BACTERIA #/AREA URNS HPF: ABNORMAL /HPF
BASOPHILS # BLD AUTO: 0.4 % (ref 0–1.8)
BASOPHILS # BLD: 0.02 K/UL (ref 0–0.12)
BILIRUB SERPL-MCNC: 0.3 MG/DL (ref 0.1–1.5)
BILIRUB UR QL STRIP.AUTO: NEGATIVE
BUN SERPL-MCNC: 19 MG/DL (ref 8–22)
CALCIUM ALBUM COR SERPL-MCNC: 8.4 MG/DL (ref 8.5–10.5)
CALCIUM SERPL-MCNC: 8.7 MG/DL (ref 8.5–10.5)
CHLORIDE SERPL-SCNC: 104 MMOL/L (ref 96–112)
CHOLEST SERPL-MCNC: 207 MG/DL (ref 100–199)
CO2 SERPL-SCNC: 26 MMOL/L (ref 20–33)
COLOR UR: YELLOW
CREAT SERPL-MCNC: 0.74 MG/DL (ref 0.5–1.4)
EOSINOPHIL # BLD AUTO: 0.1 K/UL (ref 0–0.51)
EOSINOPHIL NFR BLD: 2.2 % (ref 0–6.9)
EPI CELLS #/AREA URNS HPF: ABNORMAL /HPF
ERYTHROCYTE [DISTWIDTH] IN BLOOD BY AUTOMATED COUNT: 43.8 FL (ref 35.9–50)
GFR SERPLBLD CREATININE-BSD FMLA CKD-EPI: 83 ML/MIN/1.73 M 2
GLOBULIN SER CALC-MCNC: 2.8 G/DL (ref 1.9–3.5)
GLUCOSE SERPL-MCNC: 94 MG/DL (ref 65–99)
GLUCOSE UR STRIP.AUTO-MCNC: NEGATIVE MG/DL
HCT VFR BLD AUTO: 44.2 % (ref 37–47)
HDLC SERPL-MCNC: 60 MG/DL
HGB BLD-MCNC: 14.6 G/DL (ref 12–16)
HYALINE CASTS #/AREA URNS LPF: ABNORMAL /LPF
IMM GRANULOCYTES # BLD AUTO: 0 K/UL (ref 0–0.11)
IMM GRANULOCYTES NFR BLD AUTO: 0 % (ref 0–0.9)
KETONES UR STRIP.AUTO-MCNC: NEGATIVE MG/DL
LDLC SERPL CALC-MCNC: 131 MG/DL
LEUKOCYTE ESTERASE UR QL STRIP.AUTO: ABNORMAL
LYMPHOCYTES # BLD AUTO: 1.59 K/UL (ref 1–4.8)
LYMPHOCYTES NFR BLD: 34.6 % (ref 22–41)
MCH RBC QN AUTO: 30.1 PG (ref 27–33)
MCHC RBC AUTO-ENTMCNC: 33 G/DL (ref 32.2–35.5)
MCV RBC AUTO: 91.1 FL (ref 81.4–97.8)
MICRO URNS: ABNORMAL
MONOCYTES # BLD AUTO: 0.37 K/UL (ref 0–0.85)
MONOCYTES NFR BLD AUTO: 8.1 % (ref 0–13.4)
NEUTROPHILS # BLD AUTO: 2.51 K/UL (ref 1.82–7.42)
NEUTROPHILS NFR BLD: 54.7 % (ref 44–72)
NITRITE UR QL STRIP.AUTO: NEGATIVE
NRBC # BLD AUTO: 0 K/UL
NRBC BLD-RTO: 0 /100 WBC (ref 0–0.2)
PH UR STRIP.AUTO: 6 [PH] (ref 5–8)
PLATELET # BLD AUTO: 156 K/UL (ref 164–446)
PMV BLD AUTO: 11.8 FL (ref 9–12.9)
POTASSIUM SERPL-SCNC: 4.2 MMOL/L (ref 3.6–5.5)
PROT SERPL-MCNC: 7.2 G/DL (ref 6–8.2)
PROT UR QL STRIP: NEGATIVE MG/DL
RBC # BLD AUTO: 4.85 M/UL (ref 4.2–5.4)
RBC # URNS HPF: ABNORMAL /HPF
RBC UR QL AUTO: ABNORMAL
SODIUM SERPL-SCNC: 141 MMOL/L (ref 135–145)
SP GR UR STRIP.AUTO: 1.02
T4 FREE SERPL-MCNC: 1.59 NG/DL (ref 0.93–1.7)
TRIGL SERPL-MCNC: 79 MG/DL (ref 0–149)
TSH SERPL DL<=0.005 MIU/L-ACNC: 1 UIU/ML (ref 0.38–5.33)
UROBILINOGEN UR STRIP.AUTO-MCNC: 0.2 MG/DL
WBC # BLD AUTO: 4.6 K/UL (ref 4.8–10.8)
WBC #/AREA URNS HPF: ABNORMAL /HPF

## 2024-05-21 PROCEDURE — 99999 PR NO CHARGE: CPT

## 2024-05-21 NOTE — PROGRESS NOTES
Kimmy Diaz is a 79 y.o. female here for a non-provider visit for a lab draw on 5/21/2024 at 10:09 AM.    Procedure performed:  Venipuncture     Anatomical site:  Left Antecubital Area    Equipment used:  21 g vacutainer     Labs drawn:  annual labs    Ordering provider:  Eldon Ross MD    Lab draw completed by:  Yuliet Lucio R.N.

## 2024-05-24 RX ORDER — VENLAFAXINE HYDROCHLORIDE 150 MG/1
150 CAPSULE, EXTENDED RELEASE ORAL DAILY
Qty: 90 CAPSULE | Refills: 3 | Status: SHIPPED | OUTPATIENT
Start: 2024-05-24

## 2024-05-28 ENCOUNTER — OFFICE VISIT (OUTPATIENT)
Dept: INTERNAL MEDICINE | Facility: IMAGING CENTER | Age: 79
End: 2024-05-28
Payer: MEDICARE

## 2024-05-28 ENCOUNTER — HOSPITAL ENCOUNTER (OUTPATIENT)
Facility: MEDICAL CENTER | Age: 79
End: 2024-05-28
Attending: INTERNAL MEDICINE
Payer: MEDICARE

## 2024-05-28 VITALS
SYSTOLIC BLOOD PRESSURE: 132 MMHG | OXYGEN SATURATION: 96 % | HEART RATE: 87 BPM | BODY MASS INDEX: 19.83 KG/M2 | DIASTOLIC BLOOD PRESSURE: 70 MMHG | WEIGHT: 105 LBS | TEMPERATURE: 99 F | RESPIRATION RATE: 14 BRPM | HEIGHT: 61 IN

## 2024-05-28 DIAGNOSIS — R31.29 MICROSCOPIC HEMATURIA: ICD-10-CM

## 2024-05-28 DIAGNOSIS — R82.90 ABNORMAL FINDING ON URINALYSIS: ICD-10-CM

## 2024-05-28 DIAGNOSIS — F41.9 ANXIETY: ICD-10-CM

## 2024-05-28 DIAGNOSIS — G20.A2 PARKINSON'S DISEASE WITHOUT DYSKINESIA, WITH FLUCTUATING MANIFESTATIONS (HCC): ICD-10-CM

## 2024-05-28 DIAGNOSIS — E03.9 HYPOTHYROIDISM, UNSPECIFIED TYPE: ICD-10-CM

## 2024-05-28 DIAGNOSIS — D69.6 THROMBOCYTOPENIA, UNSPECIFIED (HCC): ICD-10-CM

## 2024-05-28 DIAGNOSIS — M81.0 AGE-RELATED OSTEOPOROSIS WITHOUT CURRENT PATHOLOGICAL FRACTURE: ICD-10-CM

## 2024-05-28 DIAGNOSIS — Z00.00 MEDICARE ANNUAL WELLNESS VISIT, SUBSEQUENT: ICD-10-CM

## 2024-05-28 DIAGNOSIS — E78.00 PURE HYPERCHOLESTEROLEMIA: ICD-10-CM

## 2024-05-28 PROCEDURE — 3075F SYST BP GE 130 - 139MM HG: CPT | Performed by: INTERNAL MEDICINE

## 2024-05-28 PROCEDURE — 3078F DIAST BP <80 MM HG: CPT | Performed by: INTERNAL MEDICINE

## 2024-05-28 PROCEDURE — G0439 PPPS, SUBSEQ VISIT: HCPCS | Performed by: INTERNAL MEDICINE

## 2024-05-28 RX ORDER — ROPINIROLE 0.25 MG/1
.25-.5 TABLET, FILM COATED ORAL 3 TIMES DAILY
Qty: 180 TABLET | Refills: 3 | Status: SHIPPED | OUTPATIENT
Start: 2024-05-28

## 2024-05-28 ASSESSMENT — ACTIVITIES OF DAILY LIVING (ADL): BATHING_REQUIRES_ASSISTANCE: 0

## 2024-05-28 ASSESSMENT — PATIENT HEALTH QUESTIONNAIRE - PHQ9: CLINICAL INTERPRETATION OF PHQ2 SCORE: 0

## 2024-05-28 ASSESSMENT — FIBROSIS 4 INDEX: FIB4 SCORE: 3.07

## 2024-05-28 ASSESSMENT — ENCOUNTER SYMPTOMS: GENERAL WELL-BEING: GOOD

## 2024-05-28 NOTE — PROGRESS NOTES
Chief Complaint   Patient presents with    Annual Exam       HISTORY OF THE PRESENT ILLNESS: Patient is a 79 y.o. female.     Patient comes in for annual health risk assessment, physical review of laboratory.  She feels that she is in good health.  Denies depression.  Denies cognitive issues or balance issues.    We discussed her Parkinson's.  She was recently started on Requip.  She previously failed carbidopa/levodopa due to side effects.  She has not found benefit with Requip but has been on very low-dose.  No side effects while on the medication.  She has follow-up with neurology scheduled in 2 weeks.    Anxiety has been an ongoing issue.  She has been doing better on Effexor but thinks that her anxiety symptoms are increasing.  We briefly tried her on 225 mg venlafaxine but she discontinued because she did not find it effective.  She has previously been on sertraline and Trintellix.    She has a history of hyperlipidemia.  Cholesterol is above goal with an LDL cholesterol of approximately 130.  She had a negative cardiac calcium test with a score of 0 approximately 5 years ago.    She has a history of hypothyroid.  This has been stable on low-dose Synthroid.  Thyroid function tests are normal.    Allergies: Augmentin [amoxicillin-pot clavulanate], Iodine, Mercury, and Other environmental    Current Outpatient Medications Ordered in Epic   Medication Sig Dispense Refill    ROPINIRole (REQUIP) 0.25 MG Tab Take 1-2 Tablets by mouth 3 times a day. 180 Tablet 3    venlafaxine (EFFEXOR-XR) 150 MG extended-release capsule Take 1 capsule by mouth once daily 90 Capsule 3    SYNTHROID 50 MCG Tab Take 1 tablet by mouth once daily 90 Tablet 3    denosumab (PROLIA) 60 MG/ML Solution Prefilled Syringe injection Inject 1 mL under the skin every 6 months. 1 mL 0    loratadine (CLARITIN) 10 MG Tab Take 10 mg by mouth every day.      ondansetron (ZOFRAN ODT) 4 MG TABLET DISPERSIBLE Take 1 Tablet by mouth every 6 hours as needed  "for Nausea. 10 Tablet 0    fluticasone (FLONASE) 50 MCG/ACT nasal spray Administer 1 Spray into affected nostril(S) every day. (Patient not taking: Reported on 5/28/2024)       No current [facility]-ordered facility-administered medications on file.       Past medical history, social history and family history were reviewed from chart today    Review of systems: Per HPI.    All others negative.     Exam: /70 (BP Location: Left arm, Patient Position: Sitting, BP Cuff Size: Adult)   Pulse 87   Temp 37.2 °C (99 °F) (Temporal)   Resp 14   Ht 1.549 m (5' 1\")   Wt 47.6 kg (105 lb)   SpO2 96%   General: Well-appearing. Well-developed. No signs of distress.  HEENT: Grossly normal. Oral cavity is pink and moist.   Neck: Supple without JVD or bruit.  Pulmonary: Clear with good breath sounds. Normal effort.  Cardiovascular: Regular. Carotid and radial pulses are intact.  Abdomen: Soft, nontender, nondistended. Spleen and liver are not enlarged.  Neurologic: Dyskinesia.  Left hand tremors.  Soft voice.  Cranial nerves II through XII are grossly normal, alert and oriented x3      Diagnosis:  1. Medicare annual wellness visit, subsequent        2. Parkinson's disease without dyskinesia, with fluctuating manifestations (HCC)        3. Hypothyroidism, unspecified type        4. Pure hypercholesterolemia        5. Thrombocytopenia, unspecified (HCC)        6. Anxiety        7. Age-related osteoporosis without current pathological fracture        8. Abnormal finding on urinalysis  URINALYSIS,CULTURE IF INDICATED      9. Microscopic hematuria            79-year-old female.  She remains in fair health.  Parkinson's is poorly controlled.  Discussed that I would like to increase her Requip to 0.5 mg 3 times daily.  We discussed that effective dose is typically >6 mg/day.  She will follow-up with neurology also.    Thyroid issues are stable.  No change in treatment.    Lipids are above goal but no sign of organ disease.  She is " trying to avoid further medication.    Chronic mild thrombocytopenia without complication.    Anxiety is an ongoing issue.  At this point we are going to continue the venlafaxine.  If she does not benefit from the dose change in Requip then consider tapering off and starting Lexapro.    Continue Prolia for osteoporosis.    Repeat urine analysis for microscopic hematuria.  If persist we will reevaluate in 1 month and if it does not resolve refer to neurology versus CT for evaluation    My total time spent caring for the patient on the day of the encounter was  greater than 45 minutes.   This includes obtaining history, reviewing chart, physical exam, patient education, reviewing outside records, placing orders, interpreting tests and coordinating care.    Portions of this note were completed using voice recognition software (Dragon Naturally speaking software) . Occasional transcription errors may have escaped proof reading. I have made every reasonable attempt to correct obvious errors, but I expect that there are errors of grammar and possibly content that I did not discover before finalizing the note.

## 2024-05-29 LAB
APPEARANCE UR: CLEAR
BACTERIA #/AREA URNS HPF: NEGATIVE /HPF
BILIRUB UR QL STRIP.AUTO: NEGATIVE
COLOR UR: YELLOW
EPI CELLS #/AREA URNS HPF: NEGATIVE /HPF
GLUCOSE UR STRIP.AUTO-MCNC: NEGATIVE MG/DL
HYALINE CASTS #/AREA URNS LPF: NORMAL /LPF
KETONES UR STRIP.AUTO-MCNC: NEGATIVE MG/DL
LEUKOCYTE ESTERASE UR QL STRIP.AUTO: ABNORMAL
MICRO URNS: ABNORMAL
NITRITE UR QL STRIP.AUTO: NEGATIVE
PH UR STRIP.AUTO: 5.5 [PH] (ref 5–8)
PROT UR QL STRIP: NEGATIVE MG/DL
RBC # URNS HPF: NORMAL /HPF
RBC UR QL AUTO: ABNORMAL
SP GR UR STRIP.AUTO: <=1.005
UROBILINOGEN UR STRIP.AUTO-MCNC: 0.2 MG/DL
WBC #/AREA URNS HPF: NORMAL /HPF

## 2024-06-11 ENCOUNTER — NON-PROVIDER VISIT (OUTPATIENT)
Dept: INTERNAL MEDICINE | Facility: IMAGING CENTER | Age: 79
End: 2024-06-11
Payer: MEDICARE

## 2024-06-11 ENCOUNTER — OFFICE VISIT (OUTPATIENT)
Dept: INTERNAL MEDICINE | Facility: IMAGING CENTER | Age: 79
End: 2024-06-11
Payer: MEDICARE

## 2024-06-11 VITALS
DIASTOLIC BLOOD PRESSURE: 78 MMHG | OXYGEN SATURATION: 97 % | RESPIRATION RATE: 12 BRPM | HEART RATE: 75 BPM | TEMPERATURE: 98.6 F | SYSTOLIC BLOOD PRESSURE: 112 MMHG

## 2024-06-11 DIAGNOSIS — J06.9 UPPER RESPIRATORY TRACT INFECTION, UNSPECIFIED TYPE: ICD-10-CM

## 2024-06-11 LAB
FLUAV RNA SPEC QL NAA+PROBE: NEGATIVE
FLUBV RNA SPEC QL NAA+PROBE: NEGATIVE
RSV RNA SPEC QL NAA+PROBE: NEGATIVE
SARS-COV-2 RNA RESP QL NAA+PROBE: NEGATIVE

## 2024-06-11 PROCEDURE — 3074F SYST BP LT 130 MM HG: CPT | Performed by: INTERNAL MEDICINE

## 2024-06-11 PROCEDURE — 99213 OFFICE O/P EST LOW 20 MIN: CPT | Performed by: INTERNAL MEDICINE

## 2024-06-11 PROCEDURE — 0241U POCT CEPHEID COV-2, FLU A/B, RSV - PCR: CPT | Performed by: INTERNAL MEDICINE

## 2024-06-11 PROCEDURE — 3078F DIAST BP <80 MM HG: CPT | Performed by: INTERNAL MEDICINE

## 2024-06-11 RX ORDER — AZITHROMYCIN 250 MG/1
TABLET, FILM COATED ORAL
Qty: 6 TABLET | Refills: 0 | Status: SHIPPED | OUTPATIENT
Start: 2024-06-11

## 2024-06-11 NOTE — PROGRESS NOTES
Chief Complaint   Patient presents with    Cough       HISTORY OF THE PRESENT ILLNESS: Patient is a 79 y.o. female.     Patient comes in with complaint of upper respiratory symptoms including cough, chest congestion and fatigue.  Symptoms for 7 days.  She tested negative for COVID, RSV and influenza.    Allergies: Augmentin [amoxicillin-pot clavulanate], Iodine, Mercury, and Other environmental    Current Outpatient Medications Ordered in Epic   Medication Sig Dispense Refill    azithromycin (ZITHROMAX Z-HORTENSIA) 250 MG Tab Take 2 tabs on day 1 then 1 tab daily for 4 days 6 Tablet 0    ROPINIRole (REQUIP) 0.25 MG Tab Take 1-2 Tablets by mouth 3 times a day. 180 Tablet 3    venlafaxine (EFFEXOR-XR) 150 MG extended-release capsule Take 1 capsule by mouth once daily 90 Capsule 3    SYNTHROID 50 MCG Tab Take 1 tablet by mouth once daily 90 Tablet 3    denosumab (PROLIA) 60 MG/ML Solution Prefilled Syringe injection Inject 1 mL under the skin every 6 months. 1 mL 0    loratadine (CLARITIN) 10 MG Tab Take 10 mg by mouth every day.      ondansetron (ZOFRAN ODT) 4 MG TABLET DISPERSIBLE Take 1 Tablet by mouth every 6 hours as needed for Nausea. 10 Tablet 0     No current Epic-ordered facility-administered medications on file.       Past medical history, social history and family history were reviewed from chart today    Review of systems: Per HPI.    All others negative.     Exam: /78 (BP Location: Left arm, Patient Position: Sitting, BP Cuff Size: Adult)   Pulse 75   Temp 37 °C (98.6 °F) (Temporal)   Resp 12   SpO2 97%   General: Well-appearing. Well-developed. No signs of distress.  HEENT: Grossly normal. Oral cavity is pink and moist.   Pulmonary: Expiratory bronchial breath sounds but otherwise clear.  No consolidation  Cardiovascular: Regular. Carotid and radial pulses are intact.  Abdomen: Soft, nontender, nondistended. Spleen and liver are not enlarged.  Neurologic: Cranial nerves II through XII are grossly  normal, alert and oriented x3      Diagnosis:  1. Upper respiratory tract infection, unspecified type          Azithromycin  Robitussin DM  Tylenol or ibuprofen as needed for fever, pain, sore throat    My total time spent caring for the patient on the day of the encounter was  greater than 20 minutes.   This includes obtaining history, reviewing chart, physical exam, patient education, reviewing outside records, placing orders, interpreting tests and coordinating care.    Portions of this note were completed using voice recognition software (Dragon Naturally speaking software) . Occasional transcription errors may have escaped proof reading. I have made every reasonable attempt to correct obvious errors, but I expect that there are errors of grammar and possibly content that I did not discover before finalizing the note.

## 2024-06-21 DIAGNOSIS — M81.0 AGE-RELATED OSTEOPOROSIS WITHOUT CURRENT PATHOLOGICAL FRACTURE: ICD-10-CM

## 2024-06-27 ENCOUNTER — APPOINTMENT (RX ONLY)
Dept: URBAN - METROPOLITAN AREA CLINIC 15 | Facility: CLINIC | Age: 79
Setting detail: DERMATOLOGY
End: 2024-06-27

## 2024-06-27 DIAGNOSIS — L21.8 OTHER SEBORRHEIC DERMATITIS: ICD-10-CM

## 2024-06-27 DIAGNOSIS — L57.0 ACTINIC KERATOSIS: ICD-10-CM

## 2024-06-27 DIAGNOSIS — L57.8 OTHER SKIN CHANGES DUE TO CHRONIC EXPOSURE TO NONIONIZING RADIATION: ICD-10-CM

## 2024-06-27 PROCEDURE — ? LIQUID NITROGEN

## 2024-06-27 PROCEDURE — ? ADDITIONAL NOTES

## 2024-06-27 PROCEDURE — ? PRESCRIPTION MEDICATION MANAGEMENT

## 2024-06-27 PROCEDURE — 17000 DESTRUCT PREMALG LESION: CPT

## 2024-06-27 PROCEDURE — 99214 OFFICE O/P EST MOD 30 MIN: CPT | Mod: 25

## 2024-06-27 PROCEDURE — ? COUNSELING

## 2024-06-27 PROCEDURE — ? PRESCRIPTION

## 2024-06-27 PROCEDURE — 17003 DESTRUCT PREMALG LES 2-14: CPT

## 2024-06-27 RX ORDER — FLUOCINOLONE ACETONIDE 0.11 MG/ML
1 OIL TOPICAL QD
Qty: 118.28 | Refills: 2 | Status: ERX | COMMUNITY
Start: 2024-06-27

## 2024-06-27 RX ADMIN — FLUOCINOLONE ACETONIDE 1: 0.11 OIL TOPICAL at 00:00

## 2024-06-27 ASSESSMENT — LOCATION SIMPLE DESCRIPTION DERM
LOCATION SIMPLE: LEFT FOREHEAD
LOCATION SIMPLE: LEFT SCALP
LOCATION SIMPLE: RIGHT FOREHEAD
LOCATION SIMPLE: LEFT EAR
LOCATION SIMPLE: SUPERIOR FOREHEAD

## 2024-06-27 ASSESSMENT — LOCATION ZONE DERM
LOCATION ZONE: FACE
LOCATION ZONE: SCALP
LOCATION ZONE: EAR

## 2024-06-27 ASSESSMENT — LOCATION DETAILED DESCRIPTION DERM
LOCATION DETAILED: LEFT MEDIAL FRONTAL SCALP
LOCATION DETAILED: SUPERIOR MID FOREHEAD
LOCATION DETAILED: LEFT CENTRAL FRONTAL SCALP
LOCATION DETAILED: LEFT SUPERIOR FOREHEAD
LOCATION DETAILED: LEFT FOREHEAD
LOCATION DETAILED: RIGHT SUPERIOR FOREHEAD
LOCATION DETAILED: LEFT INFERIOR HELIX

## 2024-06-27 NOTE — PROCEDURE: ADDITIONAL NOTES
Detail Level: Simple
Render Risk Assessment In Note?: no
Additional Notes: Pt reports using 5fu QD x 3-5 days to face and left forearm back in March\\nPt reports that some spots have improved since last visit but many have not\\nOverlap with seb derm on frontal scalp\\nUpon exam, face looks 30-40% better
Additional Notes: patient has erythematous crusted plaques on frontal scalp. very prurituc. 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 will treat the latter first & then re-eval. can consider field treatment for the scalp but will be difficult given her age & parkinsons.

## 2024-06-27 NOTE — PROCEDURE: MIPS QUALITY
Dr. Maurer returned call. Status updated on vaginal exam at 9-10cm anterior rim noted. When vaginal exam performed, medium size clot noted with light bleeding on towel. Pt feeling increased pain to low abd. Anesthesia also informed for epidural dose per dr. Maurer. Bulging bag noted with exam.   
Quality 130: Documentation Of Current Medications In The Medical Record: Current Medications Documented
Quality 226: Preventive Care And Screening: Tobacco Use: Screening And Cessation Intervention: Patient screened for tobacco use and is an ex/non-smoker
Detail Level: Detailed

## 2024-06-27 NOTE — PROCEDURE: PRESCRIPTION MEDICATION MANAGEMENT
Detail Level: Zone
Render In Strict Bullet Format?: No
Initiate Treatment: Fluocinolone oil QD PRN itch

## 2024-07-03 RX ORDER — ROPINIROLE 0.5 MG/1
0.5 TABLET, FILM COATED ORAL 3 TIMES DAILY
Qty: 270 TABLET | Refills: 0 | Status: SHIPPED | OUTPATIENT
Start: 2024-07-03

## 2024-07-09 ENCOUNTER — OFFICE VISIT (OUTPATIENT)
Dept: INTERNAL MEDICINE | Facility: IMAGING CENTER | Age: 79
End: 2024-07-09
Payer: MEDICARE

## 2024-07-09 VITALS
WEIGHT: 105 LBS | RESPIRATION RATE: 12 BRPM | OXYGEN SATURATION: 97 % | DIASTOLIC BLOOD PRESSURE: 78 MMHG | BODY MASS INDEX: 19.84 KG/M2 | SYSTOLIC BLOOD PRESSURE: 120 MMHG | HEART RATE: 91 BPM | TEMPERATURE: 98.1 F

## 2024-07-09 DIAGNOSIS — F41.9 ANXIETY: ICD-10-CM

## 2024-07-09 DIAGNOSIS — G20.A2 PARKINSON'S DISEASE WITHOUT DYSKINESIA, WITH FLUCTUATING MANIFESTATIONS (HCC): ICD-10-CM

## 2024-07-09 DIAGNOSIS — R31.29 MICROSCOPIC HEMATURIA: ICD-10-CM

## 2024-07-09 LAB
APPEARANCE UR: CLEAR
BILIRUB UR STRIP-MCNC: NORMAL MG/DL
COLOR UR AUTO: YELLOW
GLUCOSE UR STRIP.AUTO-MCNC: NORMAL MG/DL
KETONES UR STRIP.AUTO-MCNC: NORMAL MG/DL
LEUKOCYTE ESTERASE UR QL STRIP.AUTO: NORMAL
NITRITE UR QL STRIP.AUTO: NORMAL
PH UR STRIP.AUTO: 5.5 [PH] (ref 5–8)
PROT UR QL STRIP: NORMAL MG/DL
RBC UR QL AUTO: NORMAL
SP GR UR STRIP.AUTO: <=1.005
UROBILINOGEN UR STRIP-MCNC: NORMAL MG/DL

## 2024-07-09 PROCEDURE — 99214 OFFICE O/P EST MOD 30 MIN: CPT | Performed by: INTERNAL MEDICINE

## 2024-07-09 PROCEDURE — 81002 URINALYSIS NONAUTO W/O SCOPE: CPT | Performed by: INTERNAL MEDICINE

## 2024-07-09 PROCEDURE — 3078F DIAST BP <80 MM HG: CPT | Performed by: INTERNAL MEDICINE

## 2024-07-09 PROCEDURE — 3074F SYST BP LT 130 MM HG: CPT | Performed by: INTERNAL MEDICINE

## 2024-07-09 ASSESSMENT — FIBROSIS 4 INDEX: FIB4 SCORE: 3.07

## 2024-07-10 RX ORDER — ROPINIROLE 0.25 MG/1
0.25 TABLET, FILM COATED ORAL DAILY
Qty: 90 TABLET | Refills: 0 | Status: SHIPPED | OUTPATIENT
Start: 2024-07-10 | End: 2024-10-08

## 2024-07-25 ENCOUNTER — APPOINTMENT (RX ONLY)
Dept: URBAN - METROPOLITAN AREA CLINIC 15 | Facility: CLINIC | Age: 79
Setting detail: DERMATOLOGY
End: 2024-07-25

## 2024-07-25 DIAGNOSIS — D485 NEOPLASM OF UNCERTAIN BEHAVIOR OF SKIN: ICD-10-CM

## 2024-07-25 DIAGNOSIS — L57.0 ACTINIC KERATOSIS: ICD-10-CM

## 2024-07-25 DIAGNOSIS — L57.8 OTHER SKIN CHANGES DUE TO CHRONIC EXPOSURE TO NONIONIZING RADIATION: ICD-10-CM

## 2024-07-25 DIAGNOSIS — L21.8 OTHER SEBORRHEIC DERMATITIS: ICD-10-CM

## 2024-07-25 PROBLEM — D48.5 NEOPLASM OF UNCERTAIN BEHAVIOR OF SKIN: Status: ACTIVE | Noted: 2024-07-25

## 2024-07-25 PROCEDURE — 69100 BIOPSY OF EXTERNAL EAR: CPT | Mod: 59

## 2024-07-25 PROCEDURE — ? COUNSELING

## 2024-07-25 PROCEDURE — ? ADDITIONAL NOTES

## 2024-07-25 PROCEDURE — ? PRESCRIPTION MEDICATION MANAGEMENT

## 2024-07-25 PROCEDURE — 17000 DESTRUCT PREMALG LESION: CPT

## 2024-07-25 PROCEDURE — ? LIQUID NITROGEN

## 2024-07-25 PROCEDURE — ? BIOPSY BY SHAVE METHOD

## 2024-07-25 PROCEDURE — 99214 OFFICE O/P EST MOD 30 MIN: CPT | Mod: 25

## 2024-07-25 ASSESSMENT — LOCATION ZONE DERM
LOCATION ZONE: LIP
LOCATION ZONE: FACE
LOCATION ZONE: EAR

## 2024-07-25 ASSESSMENT — LOCATION DETAILED DESCRIPTION DERM
LOCATION DETAILED: LEFT ANTITRAGUS
LOCATION DETAILED: PHILTRUM
LOCATION DETAILED: SUPERIOR MID FOREHEAD

## 2024-07-25 ASSESSMENT — LOCATION SIMPLE DESCRIPTION DERM
LOCATION SIMPLE: UPPER LIP
LOCATION SIMPLE: LEFT EAR
LOCATION SIMPLE: SUPERIOR FOREHEAD

## 2024-07-25 NOTE — PROCEDURE: ADDITIONAL NOTES
Detail Level: Simple
Render Risk Assessment In Note?: no
Additional Notes: Pt reports using 5fu QD x 3-5 days to face and left forearm back in March she has not done it since.\\nPt reports that some spots have improved since last visit but many have not\\nOverlap with seb derm on frontal scalp\\nUpon exam, face looks 50-60% better
Additional Notes: Patient never used Fluocinolone oil. patient has erythematous crusted plaques on frontal scalp. very prurituc. 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 will treat the latter first & then re-eval. can consider field treatment if fails.

## 2024-08-06 ENCOUNTER — HOSPITAL ENCOUNTER (OUTPATIENT)
Dept: RADIOLOGY | Facility: MEDICAL CENTER | Age: 79
End: 2024-08-06
Attending: INTERNAL MEDICINE
Payer: MEDICARE

## 2024-08-06 DIAGNOSIS — R31.29 MICROSCOPIC HEMATURIA: ICD-10-CM

## 2024-08-06 PROCEDURE — 76775 US EXAM ABDO BACK WALL LIM: CPT

## 2024-09-10 ENCOUNTER — TELEPHONE (OUTPATIENT)
Dept: ONCOLOGY | Facility: MEDICAL CENTER | Age: 79
End: 2024-09-10
Payer: MEDICARE

## 2024-09-10 NOTE — TELEPHONE ENCOUNTER
Patient was contacted about appointment. Patient answered and patient would like to rescheduled appointment. Patient given number to .

## 2024-09-12 ENCOUNTER — HOSPITAL ENCOUNTER (OUTPATIENT)
Facility: MEDICAL CENTER | Age: 79
End: 2024-09-12
Attending: INTERNAL MEDICINE
Payer: MEDICARE

## 2024-09-12 ENCOUNTER — NON-PROVIDER VISIT (OUTPATIENT)
Dept: INTERNAL MEDICINE | Facility: IMAGING CENTER | Age: 79
End: 2024-09-12
Payer: MEDICARE

## 2024-09-12 DIAGNOSIS — E78.00 PURE HYPERCHOLESTEROLEMIA: Chronic | ICD-10-CM

## 2024-09-12 DIAGNOSIS — E03.9 ACQUIRED HYPOTHYROIDISM: Chronic | ICD-10-CM

## 2024-09-12 DIAGNOSIS — M81.0 OSTEOPOROSIS OF LOWER LEG WITHOUT PATHOLOGICAL FRACTURE: ICD-10-CM

## 2024-09-12 DIAGNOSIS — R31.29 MICROSCOPIC HEMATURIA: ICD-10-CM

## 2024-09-12 DIAGNOSIS — E03.9 HYPOTHYROIDISM, UNSPECIFIED TYPE: ICD-10-CM

## 2024-09-12 LAB
25(OH)D3 SERPL-MCNC: 49 NG/ML (ref 30–100)
ALBUMIN SERPL BCP-MCNC: 4.4 G/DL (ref 3.2–4.9)
ALBUMIN/GLOB SERPL: 1.4 G/DL
ALP SERPL-CCNC: 52 U/L (ref 30–99)
ALT SERPL-CCNC: 17 U/L (ref 2–50)
ANION GAP SERPL CALC-SCNC: 10 MMOL/L (ref 7–16)
APPEARANCE UR: CLEAR
AST SERPL-CCNC: 31 U/L (ref 12–45)
BASOPHILS # BLD AUTO: 0.5 % (ref 0–1.8)
BASOPHILS # BLD: 0.03 K/UL (ref 0–0.12)
BILIRUB SERPL-MCNC: 0.5 MG/DL (ref 0.1–1.5)
BILIRUB UR QL STRIP.AUTO: NEGATIVE
BUN SERPL-MCNC: 19 MG/DL (ref 8–22)
CALCIUM ALBUM COR SERPL-MCNC: 9 MG/DL (ref 8.5–10.5)
CALCIUM SERPL-MCNC: 9.3 MG/DL (ref 8.5–10.5)
CHLORIDE SERPL-SCNC: 99 MMOL/L (ref 96–112)
CHOLEST SERPL-MCNC: 211 MG/DL (ref 100–199)
CO2 SERPL-SCNC: 28 MMOL/L (ref 20–33)
COLOR UR: YELLOW
CREAT SERPL-MCNC: 0.77 MG/DL (ref 0.5–1.4)
EOSINOPHIL # BLD AUTO: 0.08 K/UL (ref 0–0.51)
EOSINOPHIL NFR BLD: 1.4 % (ref 0–6.9)
ERYTHROCYTE [DISTWIDTH] IN BLOOD BY AUTOMATED COUNT: 44 FL (ref 35.9–50)
GFR SERPLBLD CREATININE-BSD FMLA CKD-EPI: 79 ML/MIN/1.73 M 2
GLOBULIN SER CALC-MCNC: 3.1 G/DL (ref 1.9–3.5)
GLUCOSE SERPL-MCNC: 89 MG/DL (ref 65–99)
GLUCOSE UR STRIP.AUTO-MCNC: NEGATIVE MG/DL
HCT VFR BLD AUTO: 45 % (ref 37–47)
HDLC SERPL-MCNC: 63 MG/DL
HGB BLD-MCNC: 14.7 G/DL (ref 12–16)
IMM GRANULOCYTES # BLD AUTO: 0.02 K/UL (ref 0–0.11)
IMM GRANULOCYTES NFR BLD AUTO: 0.4 % (ref 0–0.9)
KETONES UR STRIP.AUTO-MCNC: NEGATIVE MG/DL
LDLC SERPL CALC-MCNC: 131 MG/DL
LEUKOCYTE ESTERASE UR QL STRIP.AUTO: NEGATIVE
LYMPHOCYTES # BLD AUTO: 1.67 K/UL (ref 1–4.8)
LYMPHOCYTES NFR BLD: 29.2 % (ref 22–41)
MCH RBC QN AUTO: 29.9 PG (ref 27–33)
MCHC RBC AUTO-ENTMCNC: 32.7 G/DL (ref 32.2–35.5)
MCV RBC AUTO: 91.5 FL (ref 81.4–97.8)
MICRO URNS: NORMAL
MONOCYTES # BLD AUTO: 0.52 K/UL (ref 0–0.85)
MONOCYTES NFR BLD AUTO: 9.1 % (ref 0–13.4)
NEUTROPHILS # BLD AUTO: 3.39 K/UL (ref 1.82–7.42)
NEUTROPHILS NFR BLD: 59.4 % (ref 44–72)
NITRITE UR QL STRIP.AUTO: NEGATIVE
NRBC # BLD AUTO: 0 K/UL
NRBC BLD-RTO: 0 /100 WBC (ref 0–0.2)
PH UR STRIP.AUTO: 6.5 [PH] (ref 5–8)
PLATELET # BLD AUTO: 155 K/UL (ref 164–446)
PMV BLD AUTO: 11.4 FL (ref 9–12.9)
POTASSIUM SERPL-SCNC: 4 MMOL/L (ref 3.6–5.5)
PROT SERPL-MCNC: 7.5 G/DL (ref 6–8.2)
PROT UR QL STRIP: NEGATIVE MG/DL
RBC # BLD AUTO: 4.92 M/UL (ref 4.2–5.4)
RBC UR QL AUTO: NEGATIVE
SODIUM SERPL-SCNC: 137 MMOL/L (ref 135–145)
SP GR UR STRIP.AUTO: 1
T4 FREE SERPL-MCNC: 1.28 NG/DL (ref 0.93–1.7)
TRIGL SERPL-MCNC: 86 MG/DL (ref 0–149)
TSH SERPL-ACNC: 1.94 UIU/ML (ref 0.35–5.5)
UROBILINOGEN UR STRIP.AUTO-MCNC: 0.2 MG/DL
WBC # BLD AUTO: 5.7 K/UL (ref 4.8–10.8)

## 2024-09-12 PROCEDURE — 81003 URINALYSIS AUTO W/O SCOPE: CPT

## 2024-09-12 PROCEDURE — 84443 ASSAY THYROID STIM HORMONE: CPT

## 2024-09-12 PROCEDURE — 82306 VITAMIN D 25 HYDROXY: CPT

## 2024-09-12 PROCEDURE — 80061 LIPID PANEL: CPT

## 2024-09-12 PROCEDURE — 80053 COMPREHEN METABOLIC PANEL: CPT

## 2024-09-12 PROCEDURE — 85025 COMPLETE CBC W/AUTO DIFF WBC: CPT

## 2024-09-12 PROCEDURE — 84439 ASSAY OF FREE THYROXINE: CPT

## 2024-09-12 NOTE — PROGRESS NOTES
Kimmy Diaz is a 79 y.o. female here for a non-provider visit for a lab draw on 9/12/2024 at 10:25 AM.    Procedure performed:  Venipuncture     Anatomical site:  Left Antecubital Area    Equipment used:  21 g vacutainer     Labs drawn:  3 mo f/u labs    Ordering provider:  Eldon Ross MD    Lab draw completed by:  Yuliet Lucio R.N.

## 2024-09-17 RX ORDER — ROPINIROLE 0.5 MG/1
0.5 TABLET, FILM COATED ORAL 3 TIMES DAILY
Qty: 270 TABLET | Refills: 1 | Status: SHIPPED | OUTPATIENT
Start: 2024-09-17

## 2024-09-17 RX ORDER — ROPINIROLE 0.25 MG/1
0.25 TABLET, FILM COATED ORAL DAILY
Qty: 90 TABLET | Refills: 1 | Status: SHIPPED | OUTPATIENT
Start: 2024-09-17 | End: 2025-03-16

## 2024-09-19 ENCOUNTER — OFFICE VISIT (OUTPATIENT)
Dept: INTERNAL MEDICINE | Facility: IMAGING CENTER | Age: 79
End: 2024-09-19
Payer: MEDICARE

## 2024-09-19 VITALS
RESPIRATION RATE: 12 BRPM | OXYGEN SATURATION: 98 % | HEART RATE: 80 BPM | DIASTOLIC BLOOD PRESSURE: 78 MMHG | TEMPERATURE: 97.2 F | WEIGHT: 104 LBS | BODY MASS INDEX: 19.65 KG/M2 | SYSTOLIC BLOOD PRESSURE: 112 MMHG

## 2024-09-19 DIAGNOSIS — R31.29 MICROSCOPIC HEMATURIA: ICD-10-CM

## 2024-09-19 DIAGNOSIS — E03.9 HYPOTHYROIDISM, UNSPECIFIED TYPE: ICD-10-CM

## 2024-09-19 DIAGNOSIS — F41.8 SITUATIONAL ANXIETY: ICD-10-CM

## 2024-09-19 DIAGNOSIS — G20.A2 PARKINSON'S DISEASE WITHOUT DYSKINESIA, WITH FLUCTUATING MANIFESTATIONS (HCC): ICD-10-CM

## 2024-09-19 DIAGNOSIS — M81.0 OSTEOPOROSIS OF LOWER LEG WITHOUT PATHOLOGICAL FRACTURE: ICD-10-CM

## 2024-09-19 PROCEDURE — 99215 OFFICE O/P EST HI 40 MIN: CPT | Performed by: INTERNAL MEDICINE

## 2024-09-19 PROCEDURE — 3078F DIAST BP <80 MM HG: CPT | Performed by: INTERNAL MEDICINE

## 2024-09-19 PROCEDURE — 3074F SYST BP LT 130 MM HG: CPT | Performed by: INTERNAL MEDICINE

## 2024-09-19 ASSESSMENT — FIBROSIS 4 INDEX: FIB4 SCORE: 3.78

## 2024-09-19 NOTE — PROGRESS NOTES
Chief Complaint   Patient presents with    Follow-Up       HISTORY OF THE PRESENT ILLNESS: Patient is a 79 y.o. female.     Patient comes in with her  to follow-up on chronic issues.  She has multiple questions regarding her diagnosis of Parkinson's.  She is not certain that she believes she has the disorder.  She is currently on Requip.  Her  reports that her tremor has isolated to the right arm.  Patient is uncertain that her symptoms have improved.  Her  complains that her voice amplitude is low and he often misses things that she says.  She denies any falls.  She was previously seen by neurology who felt she had findings consistent with Parkinson's.    We also discussed her anxiety.  She continues to have some issues with situational anxiety.  She missed a party because of feeling anxious.  She is currently on venlafaxine.  She has been on other SSRIs with no benefit or side effects.    Her  is questioning her treatment for osteoporosis.  She is currently on Prolia.  Her last injection was in July and she is due for follow-up in January.  He inquires about Tymlos, Forteo and similar medications.  We previously reviewed the different medications including pro and con.  She has osteoporosis of the spine and osteopenia of the hip.    Lastly, we reviewed her microscopic hematuria.  She had a normal renal ultrasound.  Repeat urine analysis was unremarkable.    Allergies: Augmentin [amoxicillin-pot clavulanate], Iodine, Mercury, and Other environmental    Current Outpatient Medications Ordered in Epic   Medication Sig Dispense Refill    ROPINIRole (REQUIP) 0.5 MG Tab Take 1 Tablet by mouth 3 times a day. 270 Tablet 1    ROPINIRole (REQUIP) 0.25 MG Tab Take 1 Tablet by mouth every day for 180 days. Take 0.25mg along with 0.5mg every morning 90 Tablet 1    venlafaxine (EFFEXOR-XR) 150 MG extended-release capsule Take 1 capsule by mouth once daily 90 Capsule 3    SYNTHROID 50 MCG Tab Take 1  tablet by mouth once daily 90 Tablet 3    denosumab (PROLIA) 60 MG/ML Solution Prefilled Syringe injection Inject 1 mL under the skin every 6 months. 1 mL 0    loratadine (CLARITIN) 10 MG Tab Take 10 mg by mouth every day.      ondansetron (ZOFRAN ODT) 4 MG TABLET DISPERSIBLE Take 1 Tablet by mouth every 6 hours as needed for Nausea. 10 Tablet 0     No current Epic-ordered facility-administered medications on file.       Past medical history, social history and family history were reviewed from chart today    Review of systems: Per HPI.    All others negative.     Exam: /78 (BP Location: Left arm, Patient Position: Sitting, BP Cuff Size: Adult)   Pulse 80   Temp 36.2 °C (97.2 °F) (Temporal)   Resp 12   Wt 47.2 kg (104 lb)   SpO2 98%   General: Well-appearing.   HEENT: Grossly normal. Oral cavity is pink and moist.   Neck: Supple without JVD or bruit.  Pulmonary: Clear with good breath sounds. Normal effort.  Cardiovascular: Regular. Carotid and radial pulses are intact.  Abdomen: Soft, nontender, nondistended. Spleen and liver are not enlarged.  Neurologic: Resting tremor in right hand/arm.  Low amplitude voice.  Minimal facial expressions.  Short based gait.  No significant shuffling.  No bradykinesia      Diagnosis:  1. Parkinson's disease without dyskinesia, with fluctuating manifestations (HCC)        2. Situational anxiety        3. Microscopic hematuria        4. Hypothyroidism, unspecified type        5. Osteoporosis of lower leg without pathological fracture          We spent approximately 30 minutes discussing her Parkinson's treatment, diagnosis and workup.  I continue to offer brain imaging for further confirmation.  We discussed the signs and symptoms of Parkinson's and how she has multiple symptoms.  She has had moderate response to the Requip.  We discussed that we could push this further but that she may have hallucinations or other side effects.  I have also offered referral back to  neurology or a second opinion regarding this diagnosis.    We reviewed her bone density and different medications available.  I continue to encourage Prolia.  We will reassess in 2 years and if she shows significant worsening then consider other treatment options.    We discussed her anxiety.  We discussed possibly adding medication or making a change.  I feel that she has done best with this.  She has more focal and appears less anxious in the office.  I discussed that there is unlikely any medication that will completely get rid of her symptoms.  I do not think she is a good benzodiazepine candidate.  I continue to offer referral to psychiatry.    My total time spent caring for the patient on the day of the encounter was  greater than 40+ minutes.   This includes obtaining history, reviewing chart, physical exam, patient education, reviewing outside records, placing orders, interpreting tests and coordinating care.    Portions of this note were completed using voice recognition software (Dragon Naturally speaking software) . Occasional transcription errors may have escaped proof reading. I have made every reasonable attempt to correct obvious errors, but I expect that there are errors of grammar and possibly content that I did not discover before finalizing the note.

## 2024-10-01 ENCOUNTER — APPOINTMENT (RX ONLY)
Dept: URBAN - METROPOLITAN AREA CLINIC 4 | Facility: CLINIC | Age: 79
Setting detail: DERMATOLOGY
End: 2024-10-01

## 2024-10-01 DIAGNOSIS — L57.0 ACTINIC KERATOSIS: ICD-10-CM

## 2024-10-01 DIAGNOSIS — L56.5 DISSEMINATED SUPERFICIAL ACTINIC POROKERATOSIS (DSAP): ICD-10-CM

## 2024-10-01 PROCEDURE — ? PRESCRIPTION

## 2024-10-01 PROCEDURE — ? COUNSELING

## 2024-10-01 PROCEDURE — 99212 OFFICE O/P EST SF 10 MIN: CPT | Mod: 25

## 2024-10-01 PROCEDURE — 17000 DESTRUCT PREMALG LESION: CPT

## 2024-10-01 PROCEDURE — 17003 DESTRUCT PREMALG LES 2-14: CPT

## 2024-10-01 PROCEDURE — ? LIQUID NITROGEN

## 2024-10-01 RX ORDER — FLUOROURACIL 5 MG/G
1 CREAM TOPICAL BID
Qty: 40 | Refills: 1 | Status: ERX | COMMUNITY
Start: 2024-10-01

## 2024-10-01 RX ADMIN — FLUOROURACIL 1: 5 CREAM TOPICAL at 00:00

## 2024-10-01 ASSESSMENT — LOCATION DETAILED DESCRIPTION DERM
LOCATION DETAILED: PHILTRUM
LOCATION DETAILED: LEFT DISTAL DORSAL FOREARM
LOCATION DETAILED: LEFT MEDIAL FRONTAL SCALP
LOCATION DETAILED: LEFT SUPERIOR MEDIAL FOREHEAD
LOCATION DETAILED: RIGHT MEDIAL FRONTAL SCALP
LOCATION DETAILED: LEFT ANTITRAGUS

## 2024-10-01 ASSESSMENT — LOCATION SIMPLE DESCRIPTION DERM
LOCATION SIMPLE: RIGHT SCALP
LOCATION SIMPLE: LEFT FOREARM
LOCATION SIMPLE: LEFT SCALP
LOCATION SIMPLE: UPPER LIP
LOCATION SIMPLE: LEFT EAR
LOCATION SIMPLE: LEFT FOREHEAD

## 2024-10-01 ASSESSMENT — LOCATION ZONE DERM
LOCATION ZONE: FACE
LOCATION ZONE: LIP
LOCATION ZONE: ARM
LOCATION ZONE: EAR
LOCATION ZONE: SCALP

## 2024-10-01 NOTE — PROCEDURE: LIQUID NITROGEN
Render Note In Bullet Format When Appropriate: No
Post-Care Instructions: I reviewed with the patient in detail post-care instructions. Patient is to wear sunprotection, and avoid picking at any of the treated lesions. Pt may apply Vaseline to crusted or scabbing areas.
Number Of Freeze-Thaw Cycles: 2 freeze-thaw cycles
Duration Of Freeze Thaw-Cycle (Seconds): 0
Show Aperture Variable?: Yes
Detail Level: Detailed
Consent: The patient's consent was obtained including but not limited to risks of blistering, scarring, darker or lighter pigmentary change, and recurrence.

## 2024-10-10 ENCOUNTER — OUTPATIENT INFUSION SERVICES (OUTPATIENT)
Dept: ONCOLOGY | Facility: MEDICAL CENTER | Age: 79
End: 2024-10-10
Attending: INTERNAL MEDICINE
Payer: MEDICARE

## 2024-10-10 VITALS
HEART RATE: 81 BPM | TEMPERATURE: 97 F | DIASTOLIC BLOOD PRESSURE: 80 MMHG | WEIGHT: 105.38 LBS | HEIGHT: 60 IN | RESPIRATION RATE: 18 BRPM | SYSTOLIC BLOOD PRESSURE: 154 MMHG | OXYGEN SATURATION: 95 % | BODY MASS INDEX: 20.69 KG/M2

## 2024-10-10 DIAGNOSIS — M81.0 AGE-RELATED OSTEOPOROSIS WITHOUT CURRENT PATHOLOGICAL FRACTURE: ICD-10-CM

## 2024-10-10 PROCEDURE — 96372 THER/PROPH/DIAG INJ SC/IM: CPT

## 2024-10-10 PROCEDURE — 36415 COLL VENOUS BLD VENIPUNCTURE: CPT

## 2024-10-10 PROCEDURE — 82330 ASSAY OF CALCIUM: CPT

## 2024-10-10 PROCEDURE — 82565 ASSAY OF CREATININE: CPT

## 2024-10-10 PROCEDURE — 700111 HCHG RX REV CODE 636 W/ 250 OVERRIDE (IP): Mod: JZ,JG | Performed by: INTERNAL MEDICINE

## 2024-10-10 RX ADMIN — DENOSUMAB 60 MG: 60 INJECTION SUBCUTANEOUS at 14:54

## 2024-10-10 ASSESSMENT — FIBROSIS 4 INDEX: FIB4 SCORE: 3.78

## 2024-10-11 LAB
CA-I BLD ISE-SCNC: 1.06 MMOL/L (ref 1.1–1.3)
CREAT BLD-MCNC: 0.7 MG/DL (ref 0.5–1.4)

## 2024-10-27 RX ORDER — ROPINIROLE 0.5 MG/1
0.5 TABLET, FILM COATED ORAL 3 TIMES DAILY
Qty: 270 TABLET | Refills: 0 | Status: SHIPPED | OUTPATIENT
Start: 2024-10-27

## 2024-12-30 DIAGNOSIS — E03.9 HYPOTHYROIDISM, UNSPECIFIED TYPE: ICD-10-CM

## 2024-12-30 RX ORDER — LEVOTHYROXINE SODIUM 50 MCG
50 TABLET ORAL DAILY
Qty: 90 TABLET | Refills: 3 | Status: SHIPPED | OUTPATIENT
Start: 2024-12-30

## 2024-12-31 DIAGNOSIS — G20.A2 PARKINSON'S DISEASE WITHOUT DYSKINESIA, WITH FLUCTUATING MANIFESTATIONS (HCC): ICD-10-CM

## 2025-01-09 ENCOUNTER — OFFICE VISIT (OUTPATIENT)
Dept: NEUROLOGY | Facility: MEDICAL CENTER | Age: 80
End: 2025-01-09
Attending: INTERNAL MEDICINE
Payer: MEDICARE

## 2025-01-09 VITALS
HEART RATE: 75 BPM | SYSTOLIC BLOOD PRESSURE: 122 MMHG | HEIGHT: 60 IN | TEMPERATURE: 97.7 F | OXYGEN SATURATION: 96 % | BODY MASS INDEX: 21.38 KG/M2 | DIASTOLIC BLOOD PRESSURE: 68 MMHG | WEIGHT: 108.91 LBS | RESPIRATION RATE: 14 BRPM

## 2025-01-09 DIAGNOSIS — G47.52 RBD (REM BEHAVIORAL DISORDER): ICD-10-CM

## 2025-01-09 DIAGNOSIS — G24.3 CERVICAL DYSTONIA: ICD-10-CM

## 2025-01-09 DIAGNOSIS — G24.9 DYSTONIA OF FOOT: ICD-10-CM

## 2025-01-09 DIAGNOSIS — G20.A1 PARKINSON'S DISEASE WITHOUT DYSKINESIA OR FLUCTUATING MANIFESTATIONS (HCC): Primary | ICD-10-CM

## 2025-01-09 DIAGNOSIS — F41.1 GAD (GENERALIZED ANXIETY DISORDER): ICD-10-CM

## 2025-01-09 PROCEDURE — G2211 COMPLEX E/M VISIT ADD ON: HCPCS | Performed by: INTERNAL MEDICINE

## 2025-01-09 PROCEDURE — 3078F DIAST BP <80 MM HG: CPT | Performed by: INTERNAL MEDICINE

## 2025-01-09 PROCEDURE — 3074F SYST BP LT 130 MM HG: CPT | Performed by: INTERNAL MEDICINE

## 2025-01-09 PROCEDURE — 99205 OFFICE O/P NEW HI 60 MIN: CPT | Performed by: INTERNAL MEDICINE

## 2025-01-09 PROCEDURE — 99212 OFFICE O/P EST SF 10 MIN: CPT | Performed by: INTERNAL MEDICINE

## 2025-01-09 RX ORDER — CARBIDOPA AND LEVODOPA 25; 100 MG/1; MG/1
1 TABLET ORAL 3 TIMES DAILY
Qty: 270 TABLET | Refills: 3 | Status: SHIPPED | OUTPATIENT
Start: 2025-01-09 | End: 2026-01-04

## 2025-01-09 ASSESSMENT — FIBROSIS 4 INDEX: FIB4 SCORE: 3.78

## 2025-01-09 ASSESSMENT — PATIENT HEALTH QUESTIONNAIRE - PHQ9: CLINICAL INTERPRETATION OF PHQ2 SCORE: 0

## 2025-01-09 NOTE — PROGRESS NOTES
Goyo Jimenez, DO  Neurology, Movement Disorders [facility]  [address], [address]. [place] [address]  Phone: [phone], Fax: [phone]     ASSESSMENT / PLAN   Kimmy Diaz is a 79 y.o. RHD female presenting for Parkinson's disease    Parkinson's disease  Sx onset approx 2021, with right arm rest tremor and right ankle inversion. Has since progressed to bilateral rest tremor. Exam also notable for bradykinesia, rigidity, and dystonia. Having insufficient response to ropinirole 0.5mg TID. Higher doses risk sfx, michelle given her age. Will transition to levodopa. Consider Rytary or Crexont if n/v recurs.  PSCNN discussed    - START carbidopa/levodopa 25/100mg tablet:  Week 1: take ½ tab every evening   Week 2: take ½ tab twice a day  Week 3: take ½ tab three times a day  Week 4: take ½ tab morning and noon and 1 tab in the evening   Week 5: take ½ tab in the morning and 1 tab noon and evening  Week 6 and thereafter: take 1 tab three times a day.     Avoid proteins (nuts/diary/meat) for 30 min after taking medicine. If you have already eaten protein, wait 1 hour before taking medication.  If it causes nausea, take it with fruit or carbohydrates (cookie, bread, cracker, etc.)      - Ropinirole 0.5mg: continue 1 tablet, 3x/day for now. Consider stopping in future as it is causing fatigue.       Cervical dystonia  Right foot dystonia  Cervical dystonia noted on exam (right laterocollis and left torticollis), and right ankle inversion  - CTM, pending levodopa response  - discussed Botox if troublesome      REM sleep behavior disorder  - consider melatonin 5mg tablets if worsening. Max dose is 15mg per night. Take 1 hour before bedtime, at a consistent time each night. Look for label with USP certification.   If not staying asleep through the whole night, look for extended-release melatonin (REMfresh brand) online.       Anxiety  PHQ-9, C-SSRS: negative  - venlafaxine   - starting levodopa  may help      Orders Placed This Encounter    carbidopa-levodopa (SINEMET)  MG Tab     Return in about 2 months (around 3/9/2025).    BILLING DOCUMENTATION:   Excluding time spent on procedures during visit, I spent 60 minutes reviewing the medical record, interviewing and examining the patient, discussing diagnosis and treatment, and coordinating care.              HISTORY OF PRESENT ILLNESS   Kimmy Diaz is a 79 y.o. RHD female presenting for Parkinson's disease    PMHx: lumbar decompression, right shoulder surgery    Here with Alonso,     Initial HPI 01/09/25  Sx onset approx 2021, with right arm rest tremor and right ankle inversion. Later developed hypophonia. Previously started on pramipexole with Dr. Cali 6/2023, but didn't start. PCP Dr. Ross started her on ropinirole 0.25mg TID which was better tolerated than carbidopa-levodopa 25/100: half-tab BID (sfx n/v).     Neuroleptics/pesticide exposure: --  Family history: --  No prior DaTscan.     Current Regimen  Ropinirole 0.5mg 3x/day  Latency: can't tell  Duration: can't tell  Efficacy: can't tell  Dyskinesia/Dystonia: right foot inversion  Sfx: drowsiness    ROS  Gait:  Feels unbalance    Orthostasis:   No issues    GI:   Fluctuates with diet, managed with high fiber diet    :   +urinary urgency and frequency    Speech/Swallow:   +hypophonia    Cognition:   Worse in AM, michelle with medications. Alonso has been helping her managing it    Mood:   +depression slight  +anxiety main issue, michelle with overly stimulation. Venlafaxine 150mg mild benefit    Hallucinations:   Sometimes thinks dog has black eyes but is blued    Sleep/RBD:   8-10 hours. Sleeps at 10PM.   +RBD: acts out dream, yelling. Freq: 1/week      Past Medical History:   Diagnosis Date    Acquired hypothyroidism 04/14/2023    medicated    Actinic keratoses 01/24/2019    Allergic rhinitis     Anesthesia 04/14/2023    PONV with tubal ligation    Anxiety     Cancer (HCC)      Skin    CATARACT 04/14/2023    Bilateral IOL    Gastroesophageal reflux disease with esophagitis 01/24/2019    Hemochromatosis     carrier    History of squamous cell carcinoma 01/24/2019    Hyperlipidemia     Osteoporosis     Other specified symptom associated with female genital organs     prolapse    Parkinson's disease (tremor, stiffness, slow motion, unstable posture) ([facility])     Pneumonia 04/14/2023    history of    PONV (postoperative nausea and vomiting) 04/14/2023    with tubal ligation    Psychiatric problem 04/14/2023    anxiety, medicated    Pure hypercholesterolemia 05/23/2019    Urinary bladder disorder     prolapse    Vaginal prolapse 01/24/2019    Vitamin D deficiency      Past Surgical History:   Procedure Laterality Date    LUMBAR DECOMPRESSION  5/1/2023    Procedure: POSTERIOR LEFT L4-5 LUMBAR INTERNAL DECOMPRESSION AND L5-S1 TRANSPEDICULAR DECOMPRESSION;  Surgeon: Nayan Reyes M.D.;  Location: [facility];  Service: Neurosurgery    OTHER ORTHOPEDIC SURGERY Right 04/14/2023    shoulder repair    PB SHLDR ARTHROSCOP,SURG,W/ROTAT CUFF REPB Right 11/04/2020    Procedure: ARTHROSCOPY, SHOULDER, WITH ROTATOR CUFF REPAIR;  Surgeon: Svetlana Mcleod M.D.;  Location: SURGERY [facility];  Service: Orthopedics    PB ARTHROSCOPY SHOULDER SURGICAL BICEPS TENODES* Right 11/04/2020    Procedure: ARTHROSCOPY, SHOULDER, WITH BICEPS TENOTOMY;  Surgeon: Svetlana Mcleod M.D.;  Location: SURGERY [facility];  Service: Orthopedics    OTHER  2018    Mohs scalp    CATARACT EXTRACTION WITH IOL Bilateral 2016    SHOULDER ARTHROSCOPY W/ ROTATOR CUFF REPAIR  07/11/2012    Performed by SVETLANA MCLEOD at SURGERY [facility]    BICEPS TENDON REPAIR  07/11/2012    Performed by SVETLANA MCLEOD at SURGERY [facility]    SHOULDER DECOMPRESSION  07/11/2012    Performed by SVETLANA MCLEOD at SURGERY [facility]    VAGINAL HYSTERECTOMY SCOPE TOTAL  08/24/2011    Performed by QAMAR COPELAND at SURGERY  [facility]    ANTERIOR AND POSTERIOR REPAIR  08/24/2011    Performed by QAMAR COPELAND at SURGERY [facility]    TUBAL LIGATION  1980    OTHER      wisdom  teeth     Family History   Problem Relation Age of Onset    Diabetes Other     Lung Disease Mother      Social History     Socioeconomic History    Marital status:      Spouse name: Not on file    Number of children: Not on file    Years of education: Not on file    Highest education level: Not on file   Occupational History    Not on file   Tobacco Use    Smoking status: Never    Smokeless tobacco: Never   Vaping Use    Vaping status: Never Used   Substance and Sexual Activity    Alcohol use: Yes     Comment: occasionally    Drug use: No    Sexual activity: Yes   Other Topics Concern    Not on file   Social History Narrative    Not on file     Social Drivers of Health     Financial Resource Strain: Not on file   Food Insecurity: Not on file   Transportation Needs: Not on file   Physical Activity: Not on file   Stress: Not on file   Social Connections: Not on file   Intimate Partner Violence: Not on file   Housing Stability: Not on file     Current Outpatient Medications   Medication    carbidopa-levodopa (SINEMET)  MG Tab    SYNTHROID 50 MCG Tab    ROPINIRole (REQUIP) 0.5 MG Tab    VITAMIN D PO    venlafaxine (EFFEXOR-XR) 150 MG extended-release capsule    denosumab (PROLIA) 60 MG/ML Solution Prefilled Syringe injection     No current facility-administered medications for this visit.     Allergies   Allergen Reactions    Augmentin [Amoxicillin-Pot Clavulanate] Diarrhea     Severe diarrhea    Iodine Rash     rash    Mercury Rash     .    Other Environmental Runny Nose and Unspecified     Grasses, trees, animal dander, also causes drowsiness             DATA / RESULTS     MR-Brain without contrast 07/13/2020 --- reviewed, agree with report  [facility]    Cerebral hemispheres: Cerebral hemispheres demonstrate mild  central and peripheral atrophy. Lateral ventricles are slightly prominent. There are ventricles within the midline. No mass effect or midline shift noted. No significant age related deep white matter changes demonstrated. No subdural epidural fluid collections demonstrated.  Axial diffusion weighted images are normal.  Posterior fossa: The cerebellum is normal. There is no evidence of an Arnold-Chiari malformation. The brainstem is normal in appearance.  The internal auditory canals are well visualized and are normal in appearance as is the pituitary gland.  Sinuses: Normal.    Impression:  1. Mild cerebral atrophy with no evidence of significant age related deep white matter changes. Remainder of the exam normal. Atrophy is considered to be at the upper limits of normal for age.  2. Parkinsonism is clinically suspected. Further evaluation with Zack imaging may be helpful      25-Hydroxy   Vitamin D 25   Date Value Ref Range Status   09/12/2024 49 30 - 100 ng/mL Final     Comment:     Adult Ranges:   <20 ng/mL - Deficiency  20-29 ng/mL - Insufficiency   ng/mL - Sufficiency  Electrochemiluminescence binding assay performed using Roche she e  immunoassay analyzer.  The Elecsys Vitamin D total II assay is intended for  the quantitative determination of total 25 hydroxyvitamin D in human serum  and plasma. This assay is to be used as an aid in the assessment of vitamin  D sufficiency in adults.       TSH   Date Value Ref Range Status   09/12/2024 1.940 0.350 - 5.500 uIU/mL Final     HIV 1/0/2   Date Value Ref Range Status   07/02/2012 see below Non Reactive Final     Comment:     Non Reactive:  Screen for Enhanced HIV 1/O/2 is NEGATIVE for  HIV-1, including group O, and HIV-2 antibodies.  A negative  test result at any point in the investigation of individual  subjects does not preclude the possibility of exposure to or  infection with HIV 1/O/2.     LDL   Date Value Ref Range Status   09/12/2024 131 (H) <100  mg/dL Final                OBJECTIVE      Vitals:    01/09/25 0914   BP: 122/68   BP Location: Left arm   Patient Position: Sitting   BP Cuff Size: Adult   Pulse: 75   Resp: 14   Temp: 36.5 °C (97.7 °F)   TempSrc: Temporal   SpO2: 96%   Weight: 49.4 kg (108 lb 14.5 oz)   Height: 1.524 m (5')     Physical Exam     Last dose of ropinirole taken this AM     General: NAD, appears stated age.      Mental status: Speech hypophonic. +hypomimia. Fund of knowledge is good.      Cranial Nerves:  CN2: PERRL. Visual fields are full to finger confrontation.   CN3/4/6: EOMI. There is no nystagmus.   CN5: V1-V3 intact to light touch    CN7: Symmetric face.   CN8: Hearing grossly intact.   CN9/10/12: Soft palate and uvula rise symmetrically. Tongue midline.   CN11: Shoulder shrug intact bilaterally.     Strength  Right Left   Shoulder Abduction  5 5   Elbow Flexion 5 5   Elbow Extension  5 5   Wrist Extension  5 5   Finger Extension  5 5   Hip Flexion  5 5   Knee Extension 5 5   Ankle Dorsiflexion  5 5     Deep Tendon Reflexes: 3+ biceps, brachioradialis, patella and 2+ ankles    Abnormal movements:    UPDRS Right Left   Finger tapping 1 1   Hand Movement 1 1   Toe Tapping 1 1   Leg Agility 1 1   Rigidity 2 2   Rest Tremor 2 1   Postural Tremor 1 1   Kinetic Tremor 1 1     +cervical dystonia: right laterocollis, left torticollis  +right foot dystonia: ankle inversion    No dyskinesias, tics, stereotypies, athetosis, akathisia, or chorea noted.      Sensory:   Quantitative tuning fork Right Left   Hands 8 8   Feet 7 7       Cerebellar: No dysmetria with FTN or heel-to-shin.    Gait:   Posture - stooped 20 deg  Base - narrow   Stride length - decreased   Arm swing - decreased, bilateral tremors. Right 1-3cm, left 1cm   Speed - slow  Shuffling/freezing - none  U-Turn - 4-5 steps           PROCEDURE     N/A

## 2025-01-09 NOTE — PATIENT INSTRUCTIONS
START carbidopa/levodopa 25/100mg tablet:  Week 1: take ½ tab every evening   Week 2: take ½ tab twice a day  Week 3: take ½ tab three times a day  Week 4: take ½ tab morning and noon and 1 tab in the evening   Week 5: take ½ tab in the morning and 1 tab noon and evening  Week 6 and thereafter: take 1 tab three times a day.     Avoid proteins (nuts/diary/meat) for 30 min after taking medicine. If you have already eaten protein, wait 1 hour before taking medication.  If it causes nausea, take it with fruit or carbohydrates (cookie, bread, cracker, etc.)        Ropinirole: continue 1 tablet, 3x/day for now. Consider stopping in future as it is causing fatigue.         [facility]  [email or web address]  [email or web address]  [phone]     Information  [employer] - www.[employer]arium.[employer]  [facility] - www.[facility].org  National Parkinson's Foundation - www.parkinson.org  [employer] [facility] - www.K[employer]latecht.org    Therapy  [facility] BIG (Physical) and LOUD (Speech) Therapy: www.lsvtglobal.com    Online exercise classes  med.[place].edu/parkinsons/living-with-PD/exercise-videos.html    A selected few from that list:  www.parkinsonseurope.org/exercisecast/  videos.aarp.org/search?q=exercise  www.[employer]/videos/  www.[employer].com/c/ZELALEMAGrGarytLouisChapter  www.[employer]umWillow.org/youtube    Tools  [employer] - make-up line designed by a person with PD for easier use  Liftware - utensils for use with tremors     NUSHU walking shoes for Parkinson's: www.[employer]..ch/shop/nushu-x     [employer]    [email or web address]   [phone]  [email or web address]  [facility]  [address], [place]  11:00 am, Monday - Friday Monday/Wednesday/Friday classes: intermediate pace/level   Tuesday/Thursday: beginner/slower paced level      www.[employer]  [place] [phone], [place] [phone]  [employer] [facility]  [address] [facility]  A & [address], [facility] Ste[employer]ing - Tuesday/Thursday at 3:00pm-4:00pm  Low Impact/Seated Boxing - Tues/Thurs at 2:00pm-2:50pm   [facility]  [address] [place] [address], [facility] Steady Boxing - Monday/Wednesday/Friday at 3:00pm-4:00pm  Senior Work Out - Mon/Fri at 4:00pm-5:00pm

## 2025-01-10 ENCOUNTER — APPOINTMENT (OUTPATIENT)
Dept: URBAN - METROPOLITAN AREA CLINIC 4 | Facility: CLINIC | Age: 80
Setting detail: DERMATOLOGY
End: 2025-01-10

## 2025-01-10 DIAGNOSIS — L82.1 OTHER SEBORRHEIC KERATOSIS: ICD-10-CM

## 2025-01-10 DIAGNOSIS — L57.0 ACTINIC KERATOSIS: ICD-10-CM

## 2025-01-10 PROCEDURE — ? MEDICATION COUNSELING

## 2025-01-10 PROCEDURE — ? ADDITIONAL NOTES

## 2025-01-10 PROCEDURE — 17000 DESTRUCT PREMALG LESION: CPT

## 2025-01-10 PROCEDURE — 17003 DESTRUCT PREMALG LES 2-14: CPT

## 2025-01-10 PROCEDURE — 99212 OFFICE O/P EST SF 10 MIN: CPT | Mod: 25

## 2025-01-10 PROCEDURE — ? COUNSELING

## 2025-01-10 PROCEDURE — ? LIQUID NITROGEN

## 2025-01-10 ASSESSMENT — LOCATION ZONE DERM
LOCATION ZONE: FACE
LOCATION ZONE: SCALP
LOCATION ZONE: FACE
LOCATION ZONE: EAR
LOCATION ZONE: LIP

## 2025-01-10 ASSESSMENT — LOCATION DETAILED DESCRIPTION DERM
LOCATION DETAILED: RIGHT LOWER CUTANEOUS LIP
LOCATION DETAILED: MID-FRONTAL SCALP
LOCATION DETAILED: LEFT SUPERIOR FOREHEAD
LOCATION DETAILED: LEFT SUPERIOR FOREHEAD
LOCATION DETAILED: LEFT ANTITRAGUS
LOCATION DETAILED: RIGHT MEDIAL FRONTAL SCALP
LOCATION DETAILED: PHILTRUM

## 2025-01-10 ASSESSMENT — LOCATION SIMPLE DESCRIPTION DERM
LOCATION SIMPLE: RIGHT SCALP
LOCATION SIMPLE: UPPER LIP
LOCATION SIMPLE: LEFT EAR
LOCATION SIMPLE: LEFT FOREHEAD
LOCATION SIMPLE: RIGHT LIP
LOCATION SIMPLE: LEFT FOREHEAD
LOCATION SIMPLE: ANTERIOR SCALP

## 2025-01-10 NOTE — PROCEDURE: MEDICATION COUNSELING
Xolair Counseling:  Patient informed of potential adverse effects including but not limited to fever, muscle aches, rash and allergic reactions.  The patient verbalized understanding of the proper use and possible adverse effects of Xolair.  All of the patient's questions and concerns were addressed.
Klisyri Pregnancy And Lactation Text: It is unknown if this medication can harm a developing fetus or if it is excreted in breast milk.
Clindamycin Pregnancy And Lactation Text: This medication can be used in pregnancy if certain situations. Clindamycin is also present in breast milk.
Tranexamic Acid Pregnancy And Lactation Text: It is unknown if this medication is safe during pregnancy or breast feeding.
Benzoyl Peroxide Pregnancy And Lactation Text: This medication is Pregnancy Category C. It is unknown if benzoyl peroxide is excreted in breast milk.
Hyrimoz Pregnancy And Lactation Text: This medication is Pregnancy Category B and is considered safe during pregnancy. It is unknown if this medication is excreted in breast milk.
Prednisone Pregnancy And Lactation Text: This medication is Pregnancy Category C and it isn't know if it is safe during pregnancy. This medication is excreted in breast milk.
Detail Level: Simple
Topical Retinoid counseling:  Patient advised to apply a pea-sized amount only at bedtime and wait 30 minutes after washing their face before applying.  If too drying, patient may add a non-comedogenic moisturizer. The patient verbalized understanding of the proper use and possible adverse effects of retinoids.  All of the patient's questions and concerns were addressed.
Rituxan Counseling:  I discussed with the patient the risks of Rituxan infusions. Side effects can include infusion reactions, severe drug rashes including mucocutaneous reactions, reactivation of latent hepatitis and other infections and rarely progressive multifocal leukoencephalopathy.  All of the patient's questions and concerns were addressed.
Niacinamide Pregnancy And Lactation Text: These medications are considered safe during pregnancy.
Sotyktu Counseling:  I discussed the most common side effects of Sotyktu including: common cold, sore throat, sinus infections, cold sores, canker sores, folliculitis, and acne.  I also discussed more serious side effects of Sotyktu including but not limited to: serious allergic reactions; increased risk for infections such as TB; cancers such as lymphomas; rhabdomyolysis and elevated CPK; and elevated triglycerides and liver enzymes. 
Topical Ketoconazole Pregnancy And Lactation Text: This medication is Pregnancy Category B and is considered safe during pregnancy. It is unknown if it is excreted in breast milk.
Xolair Pregnancy And Lactation Text: This medication is Pregnancy Category B and is considered safe during pregnancy. This medication is excreted in breast milk.
Clindamycin Counseling: I counseled the patient regarding use of clindamycin as an antibiotic for prophylactic and/or therapeutic purposes. Clindamycin is active against numerous classes of bacteria, including skin bacteria. Side effects may include nausea, diarrhea, gastrointestinal upset, rash, hives, yeast infections, and in rare cases, colitis.
Valtrex Counseling: I discussed with the patient the risks of valacyclovir including but not limited to kidney damage, nausea, vomiting and severe allergy.  The patient understands that if the infection seems to be worsening or is not improving, they are to call.
Minoxidil Counseling: Minoxidil is a topical medication which can increase blood flow where it is applied. It is uncertain how this medication increases hair growth. Side effects are uncommon and include stinging and allergic reactions.
Terbinafine Pregnancy And Lactation Text: This medication is Pregnancy Category B and is considered safe during pregnancy. It is also excreted in breast milk and breast feeding isn't recommended.
Benzoyl Peroxide Counseling: Patient counseled that medicine may cause skin irritation and bleach clothing.  In the event of skin irritation, the patient was advised to reduce the amount of the drug applied or use it less frequently.   The patient verbalized understanding of the proper use and possible adverse effects of benzoyl peroxide.  All of the patient's questions and concerns were addressed.
Include Pregnancy/Lactation Warning?: No
Ilumya Counseling: I discussed with the patient the risks of tildrakizumab including but not limited to immunosuppression, malignancy, posterior leukoencephalopathy syndrome, and serious infections.  The patient understands that monitoring is required including a PPD at baseline and must alert us or the primary physician if symptoms of infection or other concerning signs are noted.
Rituxan Pregnancy And Lactation Text: This medication is Pregnancy Category C and it isn't know if it is safe during pregnancy. It is unknown if this medication is excreted in breast milk but similar antibodies are known to be excreted.
Topical Retinoid Pregnancy And Lactation Text: This medication is Pregnancy Category C. It is unknown if this medication is excreted in breast milk.
Topical Metronidazole Counseling: Metronidazole is a topical antibiotic medication. You may experience burning, stinging, redness, or allergic reactions.  Please call our office if you develop any problems from using this medication.
Nsaids Counseling: NSAID Counseling: I discussed with the patient that NSAIDs should be taken with food. Prolonged use of NSAIDs can result in the development of stomach ulcers.  Patient advised to stop taking NSAIDs if abdominal pain occurs.  The patient verbalized understanding of the proper use and possible adverse effects of NSAIDs.  All of the patient's questions and concerns were addressed.
Sotyktu Pregnancy And Lactation Text: There is insufficient data to evaluate whether or not Sotyktu is safe to use during pregnancy.   It is not known if Sotyktu passes into breast milk and whether or not it is safe to use when breastfeeding.  
Cephalexin Pregnancy And Lactation Text: This medication is Pregnancy Category B and considered safe during pregnancy.  It is also excreted in breast milk but can be used safely for shorter doses.
Adbry Counseling: I discussed with the patient the risks of tralokinumab including but not limited to eye infection and irritation, cold sores, injection site reactions, worsening of asthma, allergic reactions and increased risk of parasitic infection.  Live vaccines should be avoided while taking tralokinumab. The patient understands that monitoring is required and they must alert us or the primary physician if symptoms of infection or other concerning signs are noted.
Valtrex Pregnancy And Lactation Text: this medication is Pregnancy Category B and is considered safe during pregnancy. This medication is not directly found in breast milk but it's metabolite acyclovir is present.
Minoxidil Pregnancy And Lactation Text: This medication has not been assigned a Pregnancy Risk Category but animal studies failed to show danger with the topical medication. It is unknown if the medication is excreted in breast milk.
Terbinafine Counseling: Patient counseling regarding adverse effects of terbinafine including but not limited to headache, diarrhea, rash, upset stomach, liver function test abnormalities, itching, taste/smell disturbance, nausea, abdominal pain, and flatulence.  There is a rare possibility of liver failure that can occur when taking terbinafine.  The patient understands that a baseline LFT and kidney function test may be required. The patient verbalized understanding of the proper use and possible adverse effects of terbinafine.  All of the patient's questions and concerns were addressed.
Azelaic Acid Pregnancy And Lactation Text: This medication is considered safe during pregnancy and breast feeding.
Arava Counseling:  Patient counseled regarding adverse effects of Arava including but not limited to nausea, vomiting, abnormalities in liver function tests. Patients may develop mouth sores, rash, diarrhea, and abnormalities in blood counts. The patient understands that monitoring is required including LFTs and blood counts.  There is a rare possibility of scarring of the liver and lung problems that can occur when taking methotrexate. Persistent nausea, loss of appetite, pale stools, dark urine, cough, and shortness of breath should be reported immediately. Patient advised to discontinue Arava treatment and consult with a physician prior to attempting conception. The patient will have to undergo a treatment to eliminate Arava from the body prior to conception.
Ilumya Pregnancy And Lactation Text: The risk during pregnancy and breastfeeding is uncertain with this medication.
Tetracycline Pregnancy And Lactation Text: This medication is Pregnancy Category D and not consider safe during pregnancy. It is also excreted in breast milk.
Siliq Counseling:  I discussed with the patient the risks of Siliq including but not limited to new or worsening depression, suicidal thoughts and behavior, immunosuppression, malignancy, posterior leukoencephalopathy syndrome, and serious infections.  The patient understands that monitoring is required including a PPD at baseline and must alert us or the primary physician if symptoms of infection or other concerning signs are noted. There is also a special program designed to monitor depression which is required with Siliq.
Topical Metronidazole Pregnancy And Lactation Text: This medication is Pregnancy Category B and considered safe during pregnancy.  It is also considered safe to use while breastfeeding.
Nsaids Pregnancy And Lactation Text: These medications are considered safe up to 30 weeks gestation. It is excreted in breast milk.
Xeljanz Counseling: I discussed with the patient the risks of Xeljanz therapy including increased risk of infection, liver issues, headache, diarrhea, or cold symptoms. Live vaccines should be avoided. They were instructed to call if they have any problems.
Adbry Pregnancy And Lactation Text: It is unknown if this medication will adversely affect pregnancy or breast feeding.
Mirvaso Counseling: Mirvaso is a topical medication which can decrease superficial blood flow where applied. Side effects are uncommon and include stinging, redness and allergic reactions.
Arava Pregnancy And Lactation Text: This medication is Pregnancy Category X and is absolutely contraindicated during pregnancy. It is unknown if it is excreted in breast milk.
Ketoconazole Pregnancy And Lactation Text: This medication is Pregnancy Category C and it isn't know if it is safe during pregnancy. It is also excreted in breast milk and breast feeding isn't recommended.
Infliximab Counseling:  I discussed with the patient the risks of infliximab including but not limited to myelosuppression, immunosuppression, autoimmune hepatitis, demyelinating diseases, lymphoma, and serious infections.  The patient understands that monitoring is required including a PPD at baseline and must alert us or the primary physician if symptoms of infection or other concerning signs are noted.
Azelaic Acid Counseling: Patient counseled that medicine may cause skin irritation and to avoid applying near the eyes.  In the event of skin irritation, the patient was advised to reduce the amount of the drug applied or use it less frequently.   The patient verbalized understanding of the proper use and possible adverse effects of azelaic acid.  All of the patient's questions and concerns were addressed.
Tetracycline Counseling: Patient counseled regarding possible photosensitivity and increased risk for sunburn.  Patient instructed to avoid sunlight, if possible.  When exposed to sunlight, patients should wear protective clothing, sunglasses, and sunscreen.  The patient was instructed to call the office immediately if the following severe adverse effects occur:  hearing changes, easy bruising/bleeding, severe headache, or vision changes.  The patient verbalized understanding of the proper use and possible adverse effects of tetracycline.  All of the patient's questions and concerns were addressed. Patient understands to avoid pregnancy while on therapy due to potential birth defects.
Olanzapine Counseling- I discussed with the patient the common side effects of olanzapine including but are not limited to: lack of energy, dry mouth, increased appetite, sleepiness, tremor, constipation, dizziness, changes in behavior, or restlessness.  Explained that teenagers are more likely to experience headaches, abdominal pain, pain in the arms or legs, tiredness, and sleepiness.  Serious side effects include but are not limited: increased risk of death in elderly patients who are confused, have memory loss, or dementia-related psychosis; hyperglycemia; increased cholesterol and triglycerides; and weight gain.
Xelnevaz Pregnancy And Lactation Text: This medication is Pregnancy Category D and is not considered safe during pregnancy.  The risk during breast feeding is also uncertain.
Topical Steroids Counseling: I discussed with the patient that prolonged use of topical steroids can result in the increased appearance of superficial blood vessels (telangiectasias), lightening (hypopigmentation) and thinning of the skin (atrophy).  Patient understands to avoid using high potency steroids in skin folds, the groin or the face.  The patient verbalized understanding of the proper use and possible adverse effects of topical steroids.  All of the patient's questions and concerns were addressed.
Hydroxyzine Pregnancy And Lactation Text: This medication is not safe during pregnancy and should not be taken. It is also excreted in breast milk and breast feeding isn't recommended.
Bimzelx Counseling:  I discussed with the patient the risks of Bimzelx including but not limited to depression, immunosuppression, allergic reactions and infections.  The patient understands that monitoring is required including a PPD at baseline and must alert us or the primary physician if symptoms of infection or other concerning signs are noted.
Cantharidin Pregnancy And Lactation Text: This medication has not been proven safe during pregnancy. It is unknown if this medication is excreted in breast milk.
Mirvaso Pregnancy And Lactation Text: This medication has not been assigned a Pregnancy Risk Category. It is unknown if the medication is excreted in breast milk.
Clofazimine Counseling:  I discussed with the patient the risks of clofazimine including but not limited to skin and eye pigmentation, liver damage, nausea/vomiting, gastrointestinal bleeding and allergy.
Ketoconazole Counseling:   Patient counseled regarding improving absorption with orange juice.  Adverse effects include but are not limited to breast enlargement, headache, diarrhea, nausea, upset stomach, liver function test abnormalities, taste disturbance, and stomach pain.  There is a rare possibility of liver failure that can occur when taking ketoconazole. The patient understands that monitoring of LFTs may be required, especially at baseline. The patient verbalized understanding of the proper use and possible adverse effects of ketoconazole.  All of the patient's questions and concerns were addressed.
Aklief Pregnancy And Lactation Text: It is unknown if this medication is safe to use during pregnancy.  It is unknown if this medication is excreted in breast milk.  Breastfeeding women should use the topical cream on the smallest area of the skin for the shortest time needed while breastfeeding.  Do not apply to nipple and areola.
Azithromycin Counseling:  I discussed with the patient the risks of azithromycin including but not limited to GI upset, allergic reaction, drug rash, diarrhea, and yeast infections.
Enbrel Counseling:  I discussed with the patient the risks of etanercept including but not limited to myelosuppression, immunosuppression, autoimmune hepatitis, demyelinating diseases, lymphoma, and infections.  The patient understands that monitoring is required including a PPD at baseline and must alert us or the primary physician if symptoms of infection or other concerning signs are noted.
Cyclosporine Counseling:  I discussed with the patient the risks of cyclosporine including but not limited to hypertension, gingival hyperplasia,myelosuppression, immunosuppression, liver damage, kidney damage, neurotoxicity, lymphoma, and serious infections. The patient understands that monitoring is required including baseline blood pressure, CBC, CMP, lipid panel and uric acid, and then 1-2 times monthly CMP and blood pressure.
Tazorac Counseling:  Patient advised that medication is irritating and drying.  Patient may need to apply sparingly and wash off after an hour before eventually leaving it on overnight.  The patient verbalized understanding of the proper use and possible adverse effects of tazorac.  All of the patient's questions and concerns were addressed.
Hydroxychloroquine Counseling:  I discussed with the patient that a baseline ophthalmologic exam is needed at the start of therapy and every year thereafter while on therapy. A CBC may also be warranted for monitoring.  The side effects of this medication were discussed with the patient, including but not limited to agranulocytosis, aplastic anemia, seizures, rashes, retinopathy, and liver toxicity. Patient instructed to call the office should any adverse effect occur.  The patient verbalized understanding of the proper use and possible adverse effects of Plaquenil.  All the patient's questions and concerns were addressed.
Litfulo Pregnancy And Lactation Text: Based on animal studies, Lifulo may cause embryo-fetal harm when administered to pregnant women.  The medication should not be used in pregnancy.  Breastfeeding is not recommended during treatment.
Saxenda Counseling: I reviewed the possible side effects including: thyroid tumors, kidney disease, gallbladder disease, abdominal pain, constipation, diarrhea, nausea, vomiting and pancreatitis. Do not take this medication if you have a history or family history of multiple endocrine neoplasia syndrome type 2. Side effects reviewed, pt to contact office should one occur.
Sski Pregnancy And Lactation Text: This medication is Pregnancy Category D and isn't considered safe during pregnancy. It is excreted in breast milk.
Erythromycin Pregnancy And Lactation Text: This medication is Pregnancy Category B and is considered safe during pregnancy. It is also excreted in breast milk.
Taltz Counseling: I discussed with the patient the risks of ixekizumab including but not limited to immunosuppression, serious infections, worsening of inflammatory bowel disease and drug reactions.  The patient understands that monitoring is required including a PPD at baseline and must alert us or the primary physician if symptoms of infection or other concerning signs are noted.
Spironolactone Counseling: Patient advised regarding risks of diarrhea, abdominal pain, hyperkalemia, birth defects (for female patients), liver toxicity and renal toxicity. The patient may need blood work to monitor liver and kidney function and potassium levels while on therapy. The patient verbalized understanding of the proper use and possible adverse effects of spironolactone.  All of the patient's questions and concerns were addressed.
Acitretin Pregnancy And Lactation Text: This medication is Pregnancy Category X and should not be given to women who are pregnant or may become pregnant in the future. This medication is excreted in breast milk.
Libtayo Pregnancy And Lactation Text: This medication is contraindicated in pregnancy and when breast feeding.
Zoryve Pregnancy And Lactation Text: It is unknown if this medication can cause problems during pregnancy and breastfeeding.
Calcipotriene Pregnancy And Lactation Text: The use of this medication during pregnancy or lactation is not recommended as there is insufficient data.
Solaraze Counseling:  I discussed with the patient the risks of Solaraze including but not limited to erythema, scaling, itching, weeping, crusting, and pain.
Hydroxychloroquine Pregnancy And Lactation Text: This medication has been shown to cause fetal harm but it isn't assigned a Pregnancy Risk Category. There are small amounts excreted in breast milk.
Tazorac Pregnancy And Lactation Text: This medication is not safe during pregnancy. It is unknown if this medication is excreted in breast milk.
Olumiant Counseling: I discussed with the patient the risks of Olumiant therapy including but not limited to upper respiratory tract infections, shingles, cold sores, and nausea. Live vaccines should be avoided.  This medication has been linked to serious infections; higher rate of mortality; malignancy and lymphoproliferative disorders; major adverse cardiovascular events; thrombosis; gastrointestinal perforations; neutropenia; lymphopenia; anemia; liver enzyme elevations; and lipid elevations.
Oxempic Pregnancy And Lactation Text: The fetal risk of this medication is unknown and taking while pregnant is not recommended. It is unknown if this medication is present in breast milk.
Erythromycin Counseling:  I discussed with the patient the risks of erythromycin including but not limited to GI upset, allergic reaction, drug rash, diarrhea, increase in liver enzymes, and yeast infections.
Spironolactone Pregnancy And Lactation Text: This medication can cause feminization of the male fetus and should be avoided during pregnancy. The active metabolite is also found in breast milk.
Acitretin Counseling:  I discussed with the patient the risks of acitretin including but not limited to hair loss, dry lips/skin/eyes, liver damage, hyperlipidemia, depression/suicidal ideation, photosensitivity.  Serious rare side effects can include but are not limited to pancreatitis, pseudotumor cerebri, bony changes, clot formation/stroke/heart attack.  Patient understands that alcohol is contraindicated since it can result in liver toxicity and significantly prolong the elimination of the drug by many years.
Imiquimod Counseling:  I discussed with the patient the risks of imiquimod including but not limited to erythema, scaling, itching, weeping, crusting, and pain.  Patient understands that the inflammatory response to imiquimod is variable from person to person and was educated regarded proper titration schedule.  If flu-like symptoms develop, patient knows to discontinue the medication and contact us.
Odomzo Counseling- I discussed with the patient the risks of Odomzo including but not limited to nausea, vomiting, diarrhea, constipation, weight loss, changes in the sense of taste, decreased appetite, muscle spasms, and hair loss.  The patient verbalized understanding of the proper use and possible adverse effects of Odomzo.  All of the patient's questions and concerns were addressed.
Zyclara Counseling:  I discussed with the patient the risks of imiquimod including but not limited to erythema, scaling, itching, weeping, crusting, and pain.  Patient understands that the inflammatory response to imiquimod is variable from person to person and was educated regarded proper titration schedule.  If flu-like symptoms develop, patient knows to discontinue the medication and contact us.
Thalidomide Counseling: I discussed with the patient the risks of thalidomide including but not limited to birth defects, anxiety, weakness, chest pain, dizziness, cough and severe allergy.
Methotrexate Counseling:  Patient counseled regarding adverse effects of methotrexate including but not limited to nausea, vomiting, abnormalities in liver function tests. Patients may develop mouth sores, rash, diarrhea, and abnormalities in blood counts. The patient understands that monitoring is required including LFT's and blood counts.  There is a rare possibility of scarring of the liver and lung problems that can occur when taking methotrexate. Persistent nausea, loss of appetite, pale stools, dark urine, cough, and shortness of breath should be reported immediately. Patient advised to discontinue methotrexate treatment at least three months before attempting to become pregnant.  I discussed the need for folate supplements while taking methotrexate.  These supplements can decrease side effects during methotrexate treatment. The patient verbalized understanding of the proper use and possible adverse effects of methotrexate.  All of the patient's questions and concerns were addressed.
Humira Counseling:  I discussed with the patient the risks of adalimumab including but not limited to myelosuppression, immunosuppression, autoimmune hepatitis, demyelinating diseases, lymphoma, and serious infections.  The patient understands that monitoring is required including a PPD at baseline and must alert us or the primary physician if symptoms of infection or other concerning signs are noted.
Calcipotriene Counseling:  I discussed with the patient the risks of calcipotriene including but not limited to erythema, scaling, itching, and irritation.
Solaraze Pregnancy And Lactation Text: This medication is Pregnancy Category B and is considered safe. There is some data to suggest avoiding during the third trimester. It is unknown if this medication is excreted in breast milk.
Topical Clindamycin Counseling: Patient counseled that this medication may cause skin irritation or allergic reactions.  In the event of skin irritation, the patient was advised to reduce the amount of the drug applied or use it less frequently.   The patient verbalized understanding of the proper use and possible adverse effects of clindamycin.  All of the patient's questions and concerns were addressed.
Low Dose Naltrexone Counseling- I discussed with the patient the potential risks and side effects of low dose naltrexone including but not limited to: more vivid dreams, headaches, nausea, vomiting, abdominal pain, fatigue, dizziness, and anxiety.
Olumiant Pregnancy And Lactation Text: Based on animal studies, Olumiant may cause embryo-fetal harm when administered to pregnant women.  The medication should not be used in pregnancy.  Breastfeeding is not recommended during treatment.
Ozempic Counseling: I reviewed the possible side effects including: thyroid tumors, kidney disease, gallbladder disease, abdominal pain, constipation, diarrhea, nausea, vomiting and pancreatitis. Do not take this medication if you have a history or family history of multiple endocrine neoplasia syndrome type 2. Side effects reviewed, pt to contact office should one occur.
Doxycycline Pregnancy And Lactation Text: This medication is Pregnancy Category D and not consider safe during pregnancy. It is also excreted in breast milk but is considered safe for shorter treatment courses.
Tremfya Counseling: I discussed with the patient the risks of guselkumab including but not limited to immunosuppression, serious infections, worsening of inflammatory bowel disease and drug reactions.  The patient understands that monitoring is required including a PPD at baseline and must alert us or the primary physician if symptoms of infection or other concerning signs are noted.
Methotrexate Pregnancy And Lactation Text: This medication is Pregnancy Category X and is known to cause fetal harm. This medication is excreted in breast milk.
Carac Pregnancy And Lactation Text: This medication is Pregnancy Category X and contraindicated in pregnancy and in women who may become pregnant. It is unknown if this medication is excreted in breast milk.
Soolantra Counseling: I discussed with the patients the risks of topial Soolantra. This is a medicine which decreases the number of mites and inflammation in the skin. You experience burning, stinging, eye irritation or allergic reactions.  Please call our office if you develop any problems from using this medication.
Low Dose Naltrexone Pregnancy And Lactation Text: Naltrexone is pregnancy category C.  There have been no adequate and well-controlled studies in pregnant women.  It should be used in pregnancy only if the potential benefit justifies the potential risk to the fetus.   Limited data indicates that naltrexone is minimally excreted into breastmilk.
Rinvoq Counseling: I discussed with the patient the risks of Rinvoq therapy including but not limited to upper respiratory tract infections, shingles, cold sores, bronchitis, nausea, cough, fever, acne, and headache. Live vaccines should be avoided.  This medication has been linked to serious infections; higher rate of mortality; malignancy and lymphoproliferative disorders; major adverse cardiovascular events; thrombosis; thrombocytopenia, anemia, and neutropenia; lipid elevations; liver enzyme elevations; and gastrointestinal perforations.
Doxycycline Counseling:  Patient counseled regarding possible photosensitivity and increased risk for sunburn.  Patient instructed to avoid sunlight, if possible.  When exposed to sunlight, patients should wear protective clothing, sunglasses, and sunscreen.  The patient was instructed to call the office immediately if the following severe adverse effects occur:  hearing changes, easy bruising/bleeding, severe headache, or vision changes.  The patient verbalized understanding of the proper use and possible adverse effects of doxycycline.  All of the patient's questions and concerns were addressed.
Klisyri Counseling:  I discussed with the patient the risks of Klisyri including but not limited to erythema, scaling, itching, weeping, crusting, and pain.
Tranexamic Acid Counseling:  Patient advised of the small risk of bleeding problems with tranexamic acid. They were also instructed to call if they developed any nausea, vomiting or diarrhea. All of the patient's questions and concerns were addressed.
Carac Counseling:  I discussed with the patient the risks of Carac including but not limited to erythema, scaling, itching, weeping, crusting, and pain.
Prednisone Counseling:  I discussed with the patient the risks of prolonged use of prednisone including but not limited to weight gain, insomnia, osteoporosis, mood changes, diabetes, susceptibility to infection, glaucoma and high blood pressure.  In cases where prednisone use is prolonged, patients should be monitored with blood pressure checks, serum glucose levels and an eye exam.  Additionally, the patient may need to be placed on GI prophylaxis, PCP prophylaxis, and calcium and vitamin D supplementation and/or a bisphosphonate.  The patient verbalized understanding of the proper use and the possible adverse effects of prednisone.  All of the patient's questions and concerns were addressed.
Rinvoq Pregnancy And Lactation Text: Based on animal studies, Rinvoq may cause embryo-fetal harm when administered to pregnant women.  The medication should not be used in pregnancy.  Breastfeeding is not recommended during treatment and for 6 days after the last dose.
Hyrimoz Counseling:  I discussed with the patient the risks of adalimumab including but not limited to myelosuppression, immunosuppression, autoimmune hepatitis, demyelinating diseases, lymphoma, and serious infections.  The patient understands that monitoring is required including a PPD at baseline and must alert us or the primary physician if symptoms of infection or other concerning signs are noted.
Nemluvio Pregnancy And Lactation Text: It is not known if Nemluvio causes fetal harm or is present in breast milk. Please proceed with caution if patients who are pregnant or breastfeeding.
Soolantra Pregnancy And Lactation Text: This medication is Pregnancy Category C. This medication is considered safe during breast feeding.
Niacinamide Counseling: I recommended taking niacin or niacinamide, also know as vitamin B3, twice daily. Recent evidence suggests that taking vitamin B3 (500 mg twice daily) can reduce the risk of actinic keratoses and non-melanoma skin cancers. Side effects of vitamin B3 include flushing and headache.
Topical Ketoconazole Counseling: Patient counseled that this medication may cause skin irritation or allergic reactions.  In the event of skin irritation, the patient was advised to reduce the amount of the drug applied or use it less frequently.   The patient verbalized understanding of the proper use and possible adverse effects of ketoconazole.  All of the patient's questions and concerns were addressed.
Quinolones Counseling:  I discussed with the patient the risks of fluoroquinolones including but not limited to GI upset, allergic reaction, drug rash, diarrhea, dizziness, photosensitivity, yeast infections, liver function test abnormalities, tendonitis/tendon rupture.
Ivermectin Pregnancy And Lactation Text: This medication is Pregnancy Category C and it isn't known if it is safe during pregnancy. It is also excreted in breast milk.
High Dose Vitamin A Counseling: Side effects reviewed, pt to contact office should one occur.
Ivermectin Counseling:  Patient instructed to take medication on an empty stomach with a full glass of water.  Patient informed of potential adverse effects including but not limited to nausea, diarrhea, dizziness, itching, and swelling of the extremities or lymph nodes.  The patient verbalized understanding of the proper use and possible adverse effects of ivermectin.  All of the patient's questions and concerns were addressed.
Oxybutynin Counseling:  I discussed with the patient the risks of oxybutynin including but not limited to skin rash, drowsiness, dry mouth, difficulty urinating, and blurred vision.
Winlevi Counseling:  I discussed with the patient the risks of topical clascoterone including but not limited to erythema, scaling, itching, and stinging. Patient voiced their understanding.
Opioid Pregnancy And Lactation Text: These medications can lead to premature delivery and should be avoided during pregnancy. These medications are also present in breast milk in small amounts.
Cellcept Counseling:  I discussed with the patient the risks of mycophenolate mofetil including but not limited to infection/immunosuppression, GI upset, hypokalemia, hypercholesterolemia, bone marrow suppression, lymphoproliferative disorders, malignancy, GI ulceration/bleed/perforation, colitis, interstitial lung disease, kidney failure, progressive multifocal leukoencephalopathy, and birth defects.  The patient understands that monitoring is required including a baseline creatinine and regular CBC testing. In addition, patient must alert us immediately if symptoms of infection or other concerning signs are noted.
Dupixent Counseling: I discussed with the patient the risks of dupilumab including but not limited to eye infection and irritation, cold sores, injection site reactions, worsening of asthma, allergic reactions and increased risk of parasitic infection.  Live vaccines should be avoided while taking dupilumab. Dupilumab will also interact with certain medications such as warfarin and cyclosporine. The patient understands that monitoring is required and they must alert us or the primary physician if symptoms of infection or other concerning signs are noted.
Protopic Pregnancy And Lactation Text: This medication is Pregnancy Category C. It is unknown if this medication is excreted in breast milk when applied topically.
Dutasteride Female Counseling: Dutasteride Counseling:  I discussed with the patient the risks of use of dutasteride including but not limited to decreased libido and sexual dysfunction. Explained the teratogenic nature of the medication and stressed the importance of not getting pregnant during treatment. All of the patient's questions and concerns were addressed.
Gabapentin Counseling: I discussed with the patient the risks of gabapentin including but not limited to dizziness, somnolence, fatigue and ataxia.
Wegovy Counseling: I reviewed the possible side effects including: thyroid tumors, kidney disease, gallbladder disease, abdominal pain, constipation, diarrhea, nausea, vomiting and pancreatitis. Do not take this medication if you have a history or family history of multiple endocrine neoplasia syndrome type 2. Side effects reviewed, pt to contact office should one occur.
Fluconazole Counseling:  Patient counseled regarding adverse effects of fluconazole including but not limited to headache, diarrhea, nausea, upset stomach, liver function test abnormalities, taste disturbance, and stomach pain.  There is a rare possibility of liver failure that can occur when taking fluconazole.  The patient understands that monitoring of LFTs and kidney function test may be required, especially at baseline. The patient verbalized understanding of the proper use and possible adverse effects of fluconazole.  All of the patient's questions and concerns were addressed.
Spevigo Counseling: I discussed with the patient the risks of Spevigo including but not limited to fatigue, nasuea, vomiting, headache, pruritus, urinary tract infection, an infusion related reactions.  The patient understands that monitoring is required including screening for tuberculosis at baseline and yearly screening thereafter while continuing Spevigo therapy. They should contact us if symptoms of infection or other concerning signs are noted.
Isotretinoin Pregnancy And Lactation Text: This medication is Pregnancy Category X and is considered extremely dangerous during pregnancy. It is unknown if it is excreted in breast milk.
Winlevi Pregnancy And Lactation Text: This medication is considered safe during pregnancy and breastfeeding.
Finasteride Female Counseling: Finasteride Counseling:  I discussed with the patient the risks of use of finasteride including but not limited to decreased libido and sexual dysfunction. Explained the teratogenic nature of the medication and stressed the importance of not getting pregnant during treatment. All of the patient's questions and concerns were addressed.
Dupixent Pregnancy And Lactation Text: This medication likely crosses the placenta but the risk for the fetus is uncertain. This medication is excreted in breast milk.
Drysol Counseling:  I discussed with the patient the risks of drysol/aluminum chloride including but not limited to skin rash, itching, irritation, burning.
Cellcept Pregnancy And Lactation Text: This medication is Pregnancy Category D and isn't considered safe during pregnancy. It is unknown if this medication is excreted in breast milk.
Dutasteride Pregnancy And Lactation Text: This medication is absolutely contraindicated in women, especially during pregnancy and breast feeding. Feminization of male fetuses is possible if taking while pregnant.
Qbrexza Counseling:  I discussed with the patient the risks of Qbrexza including but not limited to headache, mydriasis, blurred vision, dry eyes, nasal dryness, dry mouth, dry throat, dry skin, urinary hesitation, and constipation.  Local skin reactions including erythema, burning, stinging, and itching can also occur.
Gabapentin Pregnancy And Lactation Text: This medication is Pregnancy Category C and isn't considered safe during pregnancy. It is excreted in breast milk.
Cibinqo Counseling: I discussed with the patient the risks of Cibinqo therapy including but not limited to common cold, nausea, headache, cold sores, increased blood CPK levels, dizziness, UTIs, fatigue, acne, and vomitting. Live vaccines should be avoided.  This medication has been linked to serious infections; higher rate of mortality; malignancy and lymphoproliferative disorders; major adverse cardiovascular events; thrombosis; thrombocytopenia and lymphopenia; lipid elevations; and retinal detachment.
Minocycline Counseling: Patient advised regarding possible photosensitivity and discoloration of the teeth, skin, lips, tongue and gums.  Patient instructed to avoid sunlight, if possible.  When exposed to sunlight, patients should wear protective clothing, sunglasses, and sunscreen.  The patient was instructed to call the office immediately if the following severe adverse effects occur:  hearing changes, easy bruising/bleeding, severe headache, or vision changes.  The patient verbalized understanding of the proper use and possible adverse effects of minocycline.  All of the patient's questions and concerns were addressed.
Spevigo Pregnancy And Lactation Text: The risk during pregnancy and breastfeeding is uncertain with this medication. This medication does cross the placenta. It is unknown if this medication is found in breast milk.
Isotretinoin Counseling: Patient should get monthly blood tests, not donate blood, not drive at night if vision affected, not share medication, and not undergo elective surgery for 6 months after tx completed. Side effects reviewed, pt to contact office should one occur.
Propranolol Counseling:  I discussed with the patient the risks of propranolol including but not limited to low heart rate, low blood pressure, low blood sugar, restlessness and increased cold sensitivity. They should call the office if they experience any of these side effects.
VTAMA Counseling: I discussed with the patient that VTAMA is not for use in the eyes, mouth or mouth. They should call the office if they develop any signs of allergic reactions to VTAMA. The patient verbalized understanding of the proper use and possible adverse effects of VTAMA.  All of the patient's questions and concerns were addressed.
Albendazole Counseling:  I discussed with the patient the risks of albendazole including but not limited to cytopenia, kidney damage, nausea/vomiting and severe allergy.  The patient understands that this medication is being used in an off-label manner.
Finasteride Pregnancy And Lactation Text: This medication is absolutely contraindicated during pregnancy. It is unknown if it is excreted in breast milk.
Erivedge Counseling- I discussed with the patient the risks of Erivedge including but not limited to nausea, vomiting, diarrhea, constipation, weight loss, changes in the sense of taste, decreased appetite, muscle spasms, and hair loss.  The patient verbalized understanding of the proper use and possible adverse effects of Erivedge.  All of the patient's questions and concerns were addressed.
Cyclophosphamide Counseling:  I discussed with the patient the risks of cyclophosphamide including but not limited to hair loss, hormonal abnormalities, decreased fertility, abdominal pain, diarrhea, nausea and vomiting, bone marrow suppression and infection. The patient understands that monitoring is required while taking this medication.
Finasteride Male Counseling: Finasteride Counseling:  I discussed with the patient the risks of use of finasteride including but not limited to decreased libido, decreased ejaculate volume, gynecomastia, and depression. Women should not handle medication.  All of the patient's questions and concerns were addressed.
Ebglyss Counseling: I discussed with the patient the risks of lebrikizumab including but not limited to eye inflammation and irritation, cold sores, injection site reactions, allergic reactions and increased risk of parasitic infection. The patient understands that monitoring is required and they must alert us or the primary physician if symptoms of infection or other concerning signs are noted.
Cibinqo Pregnancy And Lactation Text: It is unknown if this medication will adversely affect pregnancy or breast feeding.  You should not take this medication if you are currently pregnant or planning a pregnancy or while breastfeeding.
Qbrexza Pregnancy And Lactation Text: There is no available data on Qbrexza use in pregnant women.  There is no available data on Qbrexza use in lactation.
Semaglutide Counseling: I reviewed the possible side effects including: thyroid tumors, kidney disease, gallbladder disease, abdominal pain, constipation, diarrhea, nausea, vomiting and pancreatitis. Do not take this medication if you have a history or family history of multiple endocrine neoplasia syndrome type 2. Side effects reviewed, pt to contact office should one occur.
Glycopyrrolate Counseling:  I discussed with the patient the risks of glycopyrrolate including but not limited to skin rash, drowsiness, dry mouth, difficulty urinating, and blurred vision.
Stelara Counseling:  I discussed with the patient the risks of ustekinumab including but not limited to immunosuppression, malignancy, posterior leukoencephalopathy syndrome, and serious infections.  The patient understands that monitoring is required including a PPD at baseline and must alert us or the primary physician if symptoms of infection or other concerning signs are noted.
Bexarotene Pregnancy And Lactation Text: This medication is Pregnancy Category X and should not be given to women who are pregnant or may become pregnant. This medication should not be used if you are breast feeding.
Metronidazole Pregnancy And Lactation Text: This medication is Pregnancy Category B and considered safe during pregnancy.  It is also excreted in breast milk.
Propranolol Pregnancy And Lactation Text: This medication is Pregnancy Category C and it isn't known if it is safe during pregnancy. It is excreted in breast milk.
Birth Control Pills Counseling: Birth Control Pill Counseling: I discussed with the patient the potential side effects of OCPs including but not limited to increased risk of stroke, heart attack, thrombophlebitis, deep venous thrombosis, hepatic adenomas, breast changes, GI upset, headaches, and depression.  The patient verbalized understanding of the proper use and possible adverse effects of OCPs. All of the patient's questions and concerns were addressed.
5-Fu Counseling: 5-Fluorouracil Counseling:  I discussed with the patient the risks of 5-fluorouracil including but not limited to erythema, scaling, itching, weeping, crusting, and pain.
Cyclophosphamide Pregnancy And Lactation Text: This medication is Pregnancy Category D and it isn't considered safe during pregnancy. This medication is excreted in breast milk.
Ebglyss Pregnancy And Lactation Text: This medication likely crosses the placenta but the risk for the fetus is uncertain. It is unknown if this medication is excreted in breast milk.
Litfulo Counseling: I discussed with the patient the risks of Litfulo therapy including but not limited to upper respiratory tract infections, shingles, cold sores, and nausea. Live vaccines should be avoided.  This medication has been linked to serious infections; higher rate of mortality; malignancy and lymphoproliferative disorders; major adverse cardiovascular events; thrombosis; gastrointestinal perforations; neutropenia; lymphopenia; anemia; liver enzyme elevations; and lipid elevations.
Rhofade Counseling: Rhofade is a topical medication which can decrease superficial blood flow where applied. Side effects are uncommon and include stinging, redness and allergic reactions.
Glycopyrrolate Pregnancy And Lactation Text: This medication is Pregnancy Category B and is considered safe during pregnancy. It is unknown if it is excreted breast milk.
Metronidazole Counseling:  I discussed with the patient the risks of metronidazole including but not limited to seizures, nausea/vomiting, a metallic taste in the mouth, nausea/vomiting and severe allergy.
Hydroquinone Counseling:  Patient advised that medication may result in skin irritation, lightening (hypopigmentation), dryness, and burning.  In the event of skin irritation, the patient was advised to reduce the amount of the drug applied or use it less frequently.  Rarely, spots that are treated with hydroquinone can become darker (pseudoochronosis).  Should this occur, patient instructed to stop medication and call the office. The patient verbalized understanding of the proper use and possible adverse effects of hydroquinone.  All of the patient's questions and concerns were addressed.
Bexarotene Counseling:  I discussed with the patient the risks of bexarotene including but not limited to hair loss, dry lips/skin/eyes, liver abnormalities, hyperlipidemia, pancreatitis, depression/suicidal ideation, photosensitivity, drug rash/allergic reactions, hypothyroidism, anemia, leukopenia, infection, cataracts, and teratogenicity.  Patient understands that they will need regular blood tests to check lipid profile, liver function tests, white blood cell count, thyroid function tests and pregnancy test if applicable.
Zoryve Counseling:  I discussed with the patient that Zoryve is not for use in the eyes, mouth or vagina. The most commonly reported side effects include diarrhea, headache, insomnia, application site pain, upper respiratory tract infections, and urinary tract infections.  All of the patient's questions and concerns were addressed.
Birth Control Pills Pregnancy And Lactation Text: This medication should be avoided if pregnant and for the first 30 days post-partum.
SSKI Counseling:  I discussed with the patient the risks of SSKI including but not limited to thyroid abnormalities, metallic taste, GI upset, fever, headache, acne, arthralgias, paraesthesias, lymphadenopathy, easy bleeding, arrhythmias, and allergic reaction.
Cantharidin Counseling:  I discussed with the patient the risks of Cantharidin including but not limited to pain, redness, burning, itching, and blistering.
Libtayo Counseling- I discussed with the patient the risks of Libtayo including but not limited to nausea, vomiting, diarrhea, and bone or muscle pain.  The patient verbalized understanding of the proper use and possible adverse effects of Libtayo.  All of the patient's questions and concerns were addressed.
Simlandi Counseling:  I discussed with the patient the risks of adalimumab including but not limited to myelosuppression, immunosuppression, autoimmune hepatitis, demyelinating diseases, lymphoma, and serious infections.  The patient understands that monitoring is required including a PPD at baseline and must alert us or the primary physician if symptoms of infection or other concerning signs are noted.
Topical Steroids Applications Pregnancy And Lactation Text: Most topical steroids are considered safe to use during pregnancy and lactation.  Any topical steroid applied to the breast or nipple should be washed off before breastfeeding.
Olanzapine Pregnancy And Lactation Text: This medication is pregnancy category C.   There are no adequate and well controlled trials with olanzapine in pregnant females.  Olanzapine should be used during pregnancy only if the potential benefit justifies the potential risk to the fetus.   In a study in lactating healthy women, olanzapine was excreted in breast milk.  It is recommended that women taking olanzapine should not breast feed.
Bimzelx Pregnancy And Lactation Text: This medication crosses the placenta and the safety is uncertain during pregnancy. It is unknown if this medication is present in breast milk.
Opzelura Counseling:  I discussed with the patient the risks of Opzelura including but not limited to nasopharngitis, bronchitis, ear infection, eosinophila, hives, diarrhea, folliculitis, tonsillitis, and rhinorrhea.  Taken orally, this medication has been linked to serious infections; higher rate of mortality; malignancy and lymphoproliferative disorders; major adverse cardiovascular events; thrombosis; thrombocytopenia, anemia, and neutropenia; and lipid elevations.
Hydroxyzine Counseling: Patient advised that the medication is sedating and not to drive a car after taking this medication.  Patient informed of potential adverse effects including but not limited to dry mouth, urinary retention, and blurry vision.  The patient verbalized understanding of the proper use and possible adverse effects of hydroxyzine.  All of the patient's questions and concerns were addressed.
Aklief counseling:  Patient advised to apply a pea-sized amount only at bedtime and wait 30 minutes after washing their face before applying.  If too drying, patient may add a non-comedogenic moisturizer.  The most commonly reported side effects including irritation, redness, scaling, dryness, stinging, burning, itching, and increased risk of sunburn.  The patient verbalized understanding of the proper use and possible adverse effects of retinoids.  All of the patient's questions and concerns were addressed.
Itraconazole Pregnancy And Lactation Text: This medication is Pregnancy Category C and it isn't know if it is safe during pregnancy. It is also excreted in breast milk.
Sarecycline Counseling: Patient advised regarding possible photosensitivity and discoloration of the teeth, skin, lips, tongue and gums.  Patient instructed to avoid sunlight, if possible.  When exposed to sunlight, patients should wear protective clothing, sunglasses, and sunscreen.  The patient was instructed to call the office immediately if the following severe adverse effects occur:  hearing changes, easy bruising/bleeding, severe headache, or vision changes.  The patient verbalized understanding of the proper use and possible adverse effects of sarecycline.  All of the patient's questions and concerns were addressed.
Nemluvio Counseling: I discussed with the patient the risks of nemolizumab including but not limited to headache, gastrointestinal complaints, nasopharyngitis, musculoskeletal complaints, injection site reactions, and allergic reactions. The patient understands that monitoring is required and they must alert us or the primary physician if any side effects are noted.
Azithromycin Pregnancy And Lactation Text: This medication is considered safe during pregnancy and is also secreted in breast milk.
Topical Sulfur Applications Counseling: Topical Sulfur Counseling: Patient counseled that this medication may cause skin irritation or allergic reactions.  In the event of skin irritation, the patient was advised to reduce the amount of the drug applied or use it less frequently.   The patient verbalized understanding of the proper use and possible adverse effects of topical sulfur application.  All of the patient's questions and concerns were addressed.
Oral Minoxidil Counseling- I discussed with the patient the risks of oral minoxidil including but not limited to shortness of breath, swelling of the feet or ankles, dizziness, lightheadedness, unwanted hair growth and allergic reaction.  The patient verbalized understanding of the proper use and possible adverse effects of oral minoxidil.  All of the patient's questions and concerns were addressed.
Cimzia Counseling:  I discussed with the patient the risks of Cimzia including but not limited to immunosuppression, allergic reactions and infections.  The patient understands that monitoring is required including a PPD at baseline and must alert us or the primary physician if symptoms of infection or other concerning signs are noted.
Opzelura Pregnancy And Lactation Text: There is insufficient data to evaluate drug-associated risk for major birth defects, miscarriage, or other adverse maternal or fetal outcomes.  There is a pregnancy registry that monitors pregnancy outcomes in pregnant persons exposed to the medication during pregnancy.  It is unknown if this medication is excreted in breast milk.  Do not breastfeed during treatment and for about 4 weeks after the last dose.
Doxepin Pregnancy And Lactation Text: This medication is Pregnancy Category C and it isn't known if it is safe during pregnancy. It is also excreted in breast milk and breast feeding isn't recommended.
Itraconazole Counseling:  I discussed with the patient the risks of itraconazole including but not limited to liver damage, nausea/vomiting, neuropathy, and severe allergy.  The patient understands that this medication is best absorbed when taken with acidic beverages such as non-diet cola or ginger ale.  The patient understands that monitoring is required including baseline LFTs and repeat LFTs at intervals.  The patient understands that they are to contact us or the primary physician if concerning signs are noted.
Colchicine Counseling:  Patient counseled regarding adverse effects including but not limited to stomach upset (nausea, vomiting, stomach pain, or diarrhea).  Patient instructed to limit alcohol consumption while taking this medication.  Colchicine may reduce blood counts especially with prolonged use.  The patient understands that monitoring of kidney function and blood counts may be required, especially at baseline. The patient verbalized understanding of the proper use and possible adverse effects of colchicine.  All of the patient's questions and concerns were addressed.
Bactrim Counseling:  I discussed with the patient the risks of sulfa antibiotics including but not limited to GI upset, allergic reaction, drug rash, diarrhea, dizziness, photosensitivity, and yeast infections.  Rarely, more serious reactions can occur including but not limited to aplastic anemia, agranulocytosis, methemoglobinemia, blood dyscrasias, liver or kidney failure, lung infiltrates or desquamative/blistering drug rashes.
Rifampin Pregnancy And Lactation Text: This medication is Pregnancy Category C and it isn't know if it is safe during pregnancy. It is also excreted in breast milk and should not be used if you are breast feeding.
Simponi Counseling:  I discussed with the patient the risks of golimumab including but not limited to myelosuppression, immunosuppression, autoimmune hepatitis, demyelinating diseases, lymphoma, and serious infections.  The patient understands that monitoring is required including a PPD at baseline and must alert us or the primary physician if symptoms of infection or other concerning signs are noted.
Topical Sulfur Applications Pregnancy And Lactation Text: This medication is Pregnancy Category C and has an unknown safety profile during pregnancy. It is unknown if this topical medication is excreted in breast milk.
Oral Minoxidil Pregnancy And Lactation Text: This medication should only be used when clearly needed if you are pregnant, attempting to become pregnant or breast feeding.
Eucrisa Counseling: Patient may experience a mild burning sensation during topical application. Eucrisa is not approved in children less than 2 years of age.
Doxepin Counseling:  Patient advised that the medication is sedating and not to drive a car after taking this medication. Patient informed of potential adverse effects including but not limited to dry mouth, urinary retention, and blurry vision.  The patient verbalized understanding of the proper use and possible adverse effects of doxepin.  All of the patient's questions and concerns were addressed.
Cimzia Pregnancy And Lactation Text: This medication crosses the placenta but can be considered safe in certain situations. Cimzia may be excreted in breast milk.
Picato Counseling:  I discussed with the patient the risks of Picato including but not limited to erythema, scaling, itching, weeping, crusting, and pain.
Griseofulvin Pregnancy And Lactation Text: This medication is Pregnancy Category X and is known to cause serious birth defects. It is unknown if this medication is excreted in breast milk but breast feeding should be avoided.
Bactrim Pregnancy And Lactation Text: This medication is Pregnancy Category D and is known to cause fetal risk.  It is also excreted in breast milk.
Rifampin Counseling: I discussed with the patient the risks of rifampin including but not limited to liver damage, kidney damage, red-orange body fluids, nausea/vomiting and severe allergy.
Otezla Counseling: The side effects of Otezla were discussed with the patient, including but not limited to worsening or new depression, weight loss, diarrhea, nausea, upper respiratory tract infection, and headache. Patient instructed to call the office should any adverse effect occur.  The patient verbalized understanding of the proper use and possible adverse effects of Otezla.  All the patient's questions and concerns were addressed.
Wartpeel Counseling:  I discussed with the patient the risks of Wartpeel including but not limited to erythema, scaling, itching, weeping, crusting, and pain.
Cosentyx Counseling:  I discussed with the patient the risks of Cosentyx including but not limited to worsening of Crohn's disease, immunosuppression, allergic reactions and infections.  The patient understands that monitoring is required including a PPD at baseline and must alert us or the primary physician if symptoms of infection or other concerning signs are noted.
Azathioprine Counseling:  I discussed with the patient the risks of azathioprine including but not limited to myelosuppression, immunosuppression, hepatotoxicity, lymphoma, and infections.  The patient understands that monitoring is required including baseline LFTs, Creatinine, possible TPMP genotyping and weekly CBCs for the first month and then every 2 weeks thereafter.  The patient verbalized understanding of the proper use and possible adverse effects of azathioprine.  All of the patient's questions and concerns were addressed.
Dapsone Counseling: I discussed with the patient the risks of dapsone including but not limited to hemolytic anemia, agranulocytosis, rashes, methemoglobinemia, kidney failure, peripheral neuropathy, headaches, GI upset, and liver toxicity.  Patients who start dapsone require monitoring including baseline LFTs and weekly CBCs for the first month, then every month thereafter.  The patient verbalized understanding of the proper use and possible adverse effects of dapsone.  All of the patient's questions and concerns were addressed.
Griseofulvin Counseling:  I discussed with the patient the risks of griseofulvin including but not limited to photosensitivity, cytopenia, liver damage, nausea/vomiting and severe allergy.  The patient understands that this medication is best absorbed when taken with a fatty meal (e.g., ice cream or french fries).
Zepbound Counseling: I reviewed the possible side effects including: thyroid tumors, kidney disease, gallbladder disease, abdominal pain, constipation, diarrhea, nausea, vomiting and pancreatitis. Do not take this medication if you have a history or family history of multiple endocrine neoplasia syndrome type 2. Side effects reviewed, pt to contact office should one occur.
High Dose Vitamin A Pregnancy And Lactation Text: High dose vitamin A therapy is contraindicated during pregnancy and breast feeding.
Skyrizi Counseling: I discussed with the patient the risks of risankizumab-rzaa including but not limited to immunosuppression, and serious infections.  The patient understands that monitoring is required including a PPD at baseline and must alert us or the primary physician if symptoms of infection or other concerning signs are noted.
Cephalexin Counseling: I counseled the patient regarding use of cephalexin as an antibiotic for prophylactic and/or therapeutic purposes. Cephalexin (commonly prescribed under brand name Keflex) is a cephalosporin antibiotic which is active against numerous classes of bacteria, including most skin bacteria. Side effects may include nausea, diarrhea, gastrointestinal upset, rash, hives, yeast infections, and in rare cases, hepatitis, kidney disease, seizures, fever, confusion, neurologic symptoms, and others. Patients with severe allergies to penicillin medications are cautioned that there is about a 10% incidence of cross-reactivity with cephalosporins. When possible, patients with penicillin allergies should use alternatives to cephalosporins for antibiotic therapy.
Otezla Pregnancy And Lactation Text: This medication is Pregnancy Category C and it isn't known if it is safe during pregnancy. It is unknown if it is excreted in breast milk.
Elidel Counseling: Patient may experience a mild burning sensation during topical application. Elidel is not approved in children less than 2 years of age. There have been case reports of hematologic and skin malignancies in patients using topical calcineurin inhibitors although causality is questionable.
Cimetidine Counseling:  I discussed with the patient the risks of Cimetidine including but not limited to gynecomastia, headache, diarrhea, nausea, drowsiness, arrhythmias, pancreatitis, skin rashes, psychosis, bone marrow suppression and kidney toxicity.
Opioid Counseling: I discussed with the patient the potential side effects of opioids including but not limited to addiction, altered mental status, and depression. I stressed avoiding alcohol, benzodiazepines, muscle relaxants and sleep aids unless specifically okayed by a physician. The patient verbalized understanding of the proper use and possible adverse effects of opioids. All of the patient's questions and concerns were addressed. They were instructed to flush the remaining pills down the toilet if they did not need them for pain.
Protopic Counseling: Patient may experience a mild burning sensation during topical application. Protopic is not approved in children less than 2 years of age. There have been case reports of hematologic and skin malignancies in patients using topical calcineurin inhibitors although causality is questionable.
Dutasteride Male Counseling: Dustasteride Counseling:  I discussed with the patient the risks of use of dutasteride including but not limited to decreased libido, decreased ejaculate volume, and gynecomastia. Women who can become pregnant should not handle medication.  All of the patient's questions and concerns were addressed.
Dapsone Pregnancy And Lactation Text: This medication is Pregnancy Category C and is not considered safe during pregnancy or breast feeding.

## 2025-01-10 NOTE — PROCEDURE: LIQUID NITROGEN
Show Aperture Variable?: Yes
Detail Level: Detailed
Number Of Freeze-Thaw Cycles: 2 freeze-thaw cycles
Duration Of Freeze Thaw-Cycle (Seconds): 0
Consent: The patient's consent was obtained including but not limited to risks of blistering, scarring, darker or lighter pigmentary change, and recurrence.
Render Post-Care Instructions In Note?: no
Post-Care Instructions: I reviewed with the patient in detail post-care instructions. Patient is to wear sunprotection, and avoid picking at any of the treated lesions. Pt may apply Vaseline to crusted or scabbing areas.

## 2025-01-10 NOTE — HPI: OTHER
Condition:: thick growth
Please Describe Your Condition:: Left frontal scalp; present months; not tender or bleeding.

## 2025-01-10 NOTE — PROCEDURE: ADDITIONAL NOTES
Additional Notes: We will move forward with cryo of hypertrophic lesions for maintenance. Patient is not a good candidate for 5FU as remembering to apply it is difficult.
Render Risk Assessment In Note?: no
Detail Level: Simple

## 2025-01-16 ENCOUNTER — HOSPITAL ENCOUNTER (EMERGENCY)
Facility: MEDICAL CENTER | Age: 80
End: 2025-01-16
Attending: EMERGENCY MEDICINE
Payer: MEDICARE

## 2025-01-16 VITALS
TEMPERATURE: 99.4 F | DIASTOLIC BLOOD PRESSURE: 71 MMHG | WEIGHT: 106.7 LBS | BODY MASS INDEX: 20.95 KG/M2 | RESPIRATION RATE: 20 BRPM | HEART RATE: 86 BPM | SYSTOLIC BLOOD PRESSURE: 148 MMHG | OXYGEN SATURATION: 95 % | HEIGHT: 60 IN

## 2025-01-16 DIAGNOSIS — G20.B1 PARKINSON'S DISEASE WITH DYSKINESIA, UNSPECIFIED WHETHER MANIFESTATIONS FLUCTUATE (HCC): ICD-10-CM

## 2025-01-16 DIAGNOSIS — K59.03 DRUG-INDUCED CONSTIPATION: ICD-10-CM

## 2025-01-16 LAB
ALBUMIN SERPL BCP-MCNC: 4.3 G/DL (ref 3.2–4.9)
ALBUMIN/GLOB SERPL: 1.4 G/DL
ALP SERPL-CCNC: 57 U/L (ref 30–99)
ALT SERPL-CCNC: 15 U/L (ref 2–50)
ANION GAP SERPL CALC-SCNC: 8 MMOL/L (ref 7–16)
AST SERPL-CCNC: 21 U/L (ref 12–45)
BASOPHILS # BLD AUTO: 0.3 % (ref 0–1.8)
BASOPHILS # BLD: 0.02 K/UL (ref 0–0.12)
BILIRUB SERPL-MCNC: 0.4 MG/DL (ref 0.1–1.5)
BUN SERPL-MCNC: 28 MG/DL (ref 8–22)
CALCIUM ALBUM COR SERPL-MCNC: 9.2 MG/DL (ref 8.5–10.5)
CALCIUM SERPL-MCNC: 9.4 MG/DL (ref 8.4–10.2)
CHLORIDE SERPL-SCNC: 103 MMOL/L (ref 96–112)
CO2 SERPL-SCNC: 28 MMOL/L (ref 20–33)
CREAT SERPL-MCNC: 0.68 MG/DL (ref 0.5–1.4)
EOSINOPHIL # BLD AUTO: 0.04 K/UL (ref 0–0.51)
EOSINOPHIL NFR BLD: 0.5 % (ref 0–6.9)
ERYTHROCYTE [DISTWIDTH] IN BLOOD BY AUTOMATED COUNT: 42.2 FL (ref 35.9–50)
GFR SERPLBLD CREATININE-BSD FMLA CKD-EPI: 89 ML/MIN/1.73 M 2
GLOBULIN SER CALC-MCNC: 3 G/DL (ref 1.9–3.5)
GLUCOSE SERPL-MCNC: 91 MG/DL (ref 65–99)
HCT VFR BLD AUTO: 45.2 % (ref 37–47)
HGB BLD-MCNC: 14.8 G/DL (ref 12–16)
IMM GRANULOCYTES # BLD AUTO: 0.03 K/UL (ref 0–0.11)
IMM GRANULOCYTES NFR BLD AUTO: 0.4 % (ref 0–0.9)
LYMPHOCYTES # BLD AUTO: 1.46 K/UL (ref 1–4.8)
LYMPHOCYTES NFR BLD: 18.8 % (ref 22–41)
MCH RBC QN AUTO: 29.8 PG (ref 27–33)
MCHC RBC AUTO-ENTMCNC: 32.7 G/DL (ref 32.2–35.5)
MCV RBC AUTO: 90.9 FL (ref 81.4–97.8)
MONOCYTES # BLD AUTO: 0.62 K/UL (ref 0–0.85)
MONOCYTES NFR BLD AUTO: 8 % (ref 0–13.4)
NEUTROPHILS # BLD AUTO: 5.61 K/UL (ref 1.82–7.42)
NEUTROPHILS NFR BLD: 72 % (ref 44–72)
NRBC # BLD AUTO: 0 K/UL
NRBC BLD-RTO: 0 /100 WBC (ref 0–0.2)
PLATELET # BLD AUTO: 147 K/UL (ref 164–446)
PMV BLD AUTO: 11.3 FL (ref 9–12.9)
POTASSIUM SERPL-SCNC: 5 MMOL/L (ref 3.6–5.5)
PROT SERPL-MCNC: 7.3 G/DL (ref 6–8.2)
RBC # BLD AUTO: 4.97 M/UL (ref 4.2–5.4)
SODIUM SERPL-SCNC: 139 MMOL/L (ref 135–145)
TSH SERPL DL<=0.005 MIU/L-ACNC: 1.5 UIU/ML (ref 0.38–5.33)
WBC # BLD AUTO: 7.8 K/UL (ref 4.8–10.8)

## 2025-01-16 PROCEDURE — 80053 COMPREHEN METABOLIC PANEL: CPT

## 2025-01-16 PROCEDURE — 99284 EMERGENCY DEPT VISIT MOD MDM: CPT

## 2025-01-16 PROCEDURE — 84443 ASSAY THYROID STIM HORMONE: CPT

## 2025-01-16 PROCEDURE — 700101 HCHG RX REV CODE 250: Performed by: EMERGENCY MEDICINE

## 2025-01-16 PROCEDURE — 36415 COLL VENOUS BLD VENIPUNCTURE: CPT

## 2025-01-16 PROCEDURE — 85025 COMPLETE CBC W/AUTO DIFF WBC: CPT

## 2025-01-16 RX ORDER — LIDOCAINE HYDROCHLORIDE 20 MG/ML
JELLY TOPICAL ONCE
Status: COMPLETED | OUTPATIENT
Start: 2025-01-16 | End: 2025-01-16

## 2025-01-16 RX ORDER — AMOXICILLIN 250 MG
1 CAPSULE ORAL DAILY
Qty: 30 TABLET | Refills: 0 | Status: SHIPPED | OUTPATIENT
Start: 2025-01-16 | End: 2025-01-30

## 2025-01-16 RX ADMIN — LIDOCAINE HYDROCHLORIDE 6 ML: 20 JELLY TOPICAL at 09:15

## 2025-01-16 ASSESSMENT — FIBROSIS 4 INDEX: FIB4 SCORE: 3.78

## 2025-01-16 NOTE — ED NOTES
Rectal exam by erp after placement of lidocaine jelly to anus. Saline lock with blood draw as well

## 2025-01-16 NOTE — DISCHARGE INSTRUCTIONS
Your labs today were reassuring.  We have scheduled a bowel regimen, I like you to take the senna docusate for the next 10 days to weeks, for the next 5 days he can use MiraLAX, 1 capful per day.  If this results in severe diarrhea then you can back off of both of these.  If you develop significant abdominal pain, start vomiting, develop a fever or have any other concerns we are here for you.  I have placed a GI referral to gastroenterology consultants, so they can help you manage the symptoms in the context of your Parkinson's moving forward.  If you have any issues that I can help with feel free to call me, [phone]

## 2025-01-16 NOTE — ED NOTES
Pt states she has been constipated for about three days. Pt states she has tried remedies at home that are not working.

## 2025-01-16 NOTE — ED PROVIDER NOTES
ED Provider Note    CHIEF COMPLAINT  Chief Complaint   Patient presents with    Constipation     No bm x 3 days, denies abd pain       EXTERNAL RECORDS REVIEWED  Outpatient Notes most recent outpatient note, patient seen by neurology for longstanding history of Parkinson's disease, started on venlafaxine.  Patient on levodopa.  PDMP aware database also reviewed, there is no evidence of recently filled opioid prescription    HPI/ALEJANDRA      Kimmy Diaz is a 79 y.o. female who presents to complain of abdominal cramping and pain which she believes is likely secondary to constipation.  Patient has a complicated medical history including Parkinson's disease, and hypothyroidism.  Patient reports that she has been doing with constipation for the last few days, she felt like she was impacted, she did a manual disimpaction at home, and felt like she got a fair amount of stool out.  She went to bed and then woke up with stool in her underpants, that was liquid.  She denies any black or bloody stool but then getting great look at it because it was early in the morning.  She denies any associated nausea or vomiting.  She denies any associated abdominal pain.  Patient recently started on multiple medications for her Parkinson's, levodopa carbidopa, Requip and venlafaxine.    PAST MEDICAL HISTORY   has a past medical history of Acquired hypothyroidism (04/14/2023), Actinic keratoses (01/24/2019), Allergic rhinitis, Anesthesia (04/14/2023), Anxiety, Cancer ([facility]), CATARACT (04/14/2023), Gastroesophageal reflux disease with esophagitis (01/24/2019), Hemochromatosis, History of squamous cell carcinoma (01/24/2019), Hyperlipidemia, Osteoporosis, Other specified symptom associated with female genital organs, Parkinson's disease (tremor, stiffness, slow motion, unstable posture) ([facility]), Pneumonia (04/14/2023), PONV (postoperative nausea and vomiting) (04/14/2023), Psychiatric problem (04/14/2023), Pure hypercholesterolemia  (05/23/2019), Urinary bladder disorder, Vaginal prolapse (01/24/2019), and Vitamin D deficiency.    SURGICAL HISTORY   has a past surgical history that includes tubal ligation (1980); other; vaginal hysterectomy scope total (08/24/2011); anterior and posterior repair (08/24/2011); shoulder arthroscopy w/ rotator cuff repair (07/11/2012); biceps tendon repair (07/11/2012); shoulder decompression (07/11/2012); cataract extraction with iol (Bilateral, 2016); other (2018); shldr arthroscop,surg,w/rotat cuff repr (Right, 11/04/2020); arthroscopy shoulder surgical biceps tenodes* (Right, 11/04/2020); other orthopedic surgery (Right, 04/14/2023); and lumbar decompression (5/1/2023).    FAMILY HISTORY  Family History   Problem Relation Age of Onset    Diabetes Other     Lung Disease Mother        SOCIAL HISTORY  Social History     Tobacco Use    Smoking status: Never    Smokeless tobacco: Never   Vaping Use    Vaping status: Never Used   Substance and Sexual Activity    Alcohol use: Yes     Comment: occasionally    Drug use: No    Sexual activity: Yes       CURRENT MEDICATIONS  Home Medications    **Home medications have not yet been reviewed for this encounter**         ALLERGIES  Allergies   Allergen Reactions    Augmentin [Amoxicillin-Pot Clavulanate] Diarrhea     Severe diarrhea    Iodine Rash     rash    Mercury Rash     .    Other Environmental Runny Nose and Unspecified     Grasses, trees, animal dander, also causes drowsiness       PHYSICAL EXAM  VITAL SIGNS: There were no vitals taken for this visit.   Physical Exam  Constitutional:       Appearance: She is well-developed.   HENT:      Head: Normocephalic and atraumatic.   Eyes:      Pupils: Pupils are equal, round, and reactive to light.   Cardiovascular:      Rate and Rhythm: Normal rate and regular rhythm.   Pulmonary:      Effort: Pulmonary effort is normal. No accessory muscle usage or respiratory distress.      Breath sounds: Normal breath sounds.    Abdominal:      General: Bowel sounds are normal.      Palpations: Abdomen is soft. There is no mass.      Tenderness: There is no abdominal tenderness.   Genitourinary:     Comments: Brown stool, normal rectal tone, no associated impaction  Musculoskeletal:         General: Normal range of motion.   Skin:     General: Skin is warm.      Capillary Refill: Capillary refill takes less than 2 seconds.   Neurological:      General: No focal deficit present.      Mental Status: She is alert.      Comments: Bilateral strength in lower extremities 5 out of 5 including distal and proximal musculature.  Sensation intact throughout.   Psychiatric:         Mood and Affect: Mood normal. Mood is not anxious.           EKG/LABS  Results for orders placed or performed during the hospital encounter of 01/16/25   CBC WITH DIFFERENTIAL    Collection Time: 01/16/25  9:13 AM   Result Value Ref Range    WBC 7.8 4.8 - 10.8 K/uL    RBC 4.97 4.20 - 5.40 M/uL    Hemoglobin 14.8 12.0 - 16.0 g/dL    Hematocrit 45.2 37.0 - 47.0 %    MCV 90.9 81.4 - 97.8 fL    MCH 29.8 27.0 - 33.0 pg    MCHC 32.7 32.2 - 35.5 g/dL    RDW 42.2 35.9 - 50.0 fL    Platelet Count 147 (L) 164 - 446 K/uL    MPV 11.3 9.0 - 12.9 fL    Neutrophils-Polys 72.00 44.00 - 72.00 %    Lymphocytes 18.80 (L) 22.00 - 41.00 %    Monocytes 8.00 0.00 - 13.40 %    Eosinophils 0.50 0.00 - 6.90 %    Basophils 0.30 0.00 - 1.80 %    Immature Granulocytes 0.40 0.00 - 0.90 %    Nucleated RBC 0.00 0.00 - 0.20 /100 WBC    Neutrophils (Absolute) 5.61 1.82 - 7.42 K/uL    Lymphs (Absolute) 1.46 1.00 - 4.80 K/uL    Monos (Absolute) 0.62 0.00 - 0.85 K/uL    Eos (Absolute) 0.04 0.00 - 0.51 K/uL    Baso (Absolute) 0.02 0.00 - 0.12 K/uL    Immature Granulocytes (abs) 0.03 0.00 - 0.11 K/uL    NRBC (Absolute) 0.00 K/uL   CMP    Collection Time: 01/16/25  9:13 AM   Result Value Ref Range    Sodium 139 135 - 145 mmol/L    Potassium 5.0 3.6 - 5.5 mmol/L    Chloride 103 96 - 112 mmol/L    Co2 28 20 - 33  mmol/L    Anion Gap 8.0 7.0 - 16.0    Glucose 91 65 - 99 mg/dL    Bun 28 (H) 8 - 22 mg/dL    Creatinine 0.68 0.50 - 1.40 mg/dL    Calcium 9.4 8.4 - 10.2 mg/dL    Correct Calcium 9.2 8.5 - 10.5 mg/dL    AST(SGOT) 21 12 - 45 U/L    ALT(SGPT) 15 2 - 50 U/L    Alkaline Phosphatase 57 30 - 99 U/L    Total Bilirubin 0.4 0.1 - 1.5 mg/dL    Albumin 4.3 3.2 - 4.9 g/dL    Total Protein 7.3 6.0 - 8.2 g/dL    Globulin 3.0 1.9 - 3.5 g/dL    A-G Ratio 1.4 g/dL   TSH WITH REFLEX TO FT4    Collection Time: 01/16/25  9:13 AM   Result Value Ref Range    TSH 1.500 0.380 - 5.330 uIU/mL   ESTIMATED GFR    Collection Time: 01/16/25  9:13 AM   Result Value Ref Range    GFR (CKD-EPI) 89 >60 mL/min/1.73 m 2       I have independently interpreted this EKG      COURSE & MEDICAL DECISION MAKING    ASSESSMENT, COURSE AND PLAN  Care Narrative: Very well-appearing patient here with symptoms of likely constipation and possible encopresis.  Rectal exam fails to reveal any large fecal impaction though certainly a proximal impaction is possible.  Patient with good rectal tone, my suspicion of spinal compressive lesion is very low here.  Patient with entirely benign abdominal exam, surgical pathology thought to be highly unlikely.  Patient given an enema.  Basic labs were checked to evaluate for electrolyte abnormality as the cause of her symptoms and to further risk stratify.  TSH was also send given patient's history of hypothyroidism.  Patient's labs are all very reassuring.  Patient with a large bowel movement following enema.  Home with bowel regimen.  Return precautions reviewed.            DISPOSITION AND DISCUSSIONS    Escalation of care considered, and ultimately not performed:diagnostic imaging was deferred including CT abdomen pelvis in this very well-appearing patient, surgical pathology thought to be highly unlikely, please see above for further reasoning      FINAL DIAGNOSIS  1. Drug-induced constipation  senna-docusate (PERICOLACE OR  SENOKOT S) 8.6-50 MG Tab    Referral to Gastroenterology      2. Parkinson's disease with dyskinesia, unspecified whether manifestations fluctuate (HCC)

## 2025-01-16 NOTE — ED NOTES
Dc instructions reviewed with pt and spouse. Aware of need to  meds at [facility] on [address] and take today and every day along with miralax. F/u with gi and pcp, return for worsening s/s

## 2025-01-30 ENCOUNTER — OFFICE VISIT (OUTPATIENT)
Dept: INTERNAL MEDICINE | Facility: IMAGING CENTER | Age: 80
End: 2025-01-30
Payer: MEDICARE

## 2025-01-30 VITALS
DIASTOLIC BLOOD PRESSURE: 72 MMHG | BODY MASS INDEX: 20.7 KG/M2 | OXYGEN SATURATION: 94 % | TEMPERATURE: 97.7 F | WEIGHT: 106 LBS | RESPIRATION RATE: 12 BRPM | HEART RATE: 87 BPM | SYSTOLIC BLOOD PRESSURE: 120 MMHG

## 2025-01-30 DIAGNOSIS — G20.B1 PARKINSON'S DISEASE WITH DYSKINESIA, UNSPECIFIED WHETHER MANIFESTATIONS FLUCTUATE (HCC): ICD-10-CM

## 2025-01-30 DIAGNOSIS — K59.09 CHRONIC CONSTIPATION: ICD-10-CM

## 2025-01-30 DIAGNOSIS — N39.498 OTHER URINARY INCONTINENCE: ICD-10-CM

## 2025-01-30 DIAGNOSIS — F41.8 SITUATIONAL ANXIETY: ICD-10-CM

## 2025-01-30 DIAGNOSIS — N32.81 OAB (OVERACTIVE BLADDER): ICD-10-CM

## 2025-01-30 RX ORDER — MIRABEGRON 25 MG/1
25 TABLET, FILM COATED, EXTENDED RELEASE ORAL DAILY
Qty: 30 TABLET | Refills: 3 | Status: SHIPPED | OUTPATIENT
Start: 2025-01-30

## 2025-01-30 RX ORDER — ROPINIROLE 0.5 MG/1
0.5 TABLET, FILM COATED ORAL 2 TIMES DAILY
Qty: 180 TABLET | Refills: 0 | Status: SHIPPED | OUTPATIENT
Start: 2025-01-30

## 2025-01-30 ASSESSMENT — FIBROSIS 4 INDEX: FIB4 SCORE: 2.88

## 2025-01-30 NOTE — PROGRESS NOTES
"Chief Complaint   Patient presents with    Follow-Up       HISTORY OF THE PRESENT ILLNESS: Patient is a 79 y.o. female.     Patient comes in for follow-up.  She and her  requested visit to update current health issues:    Recently, she was in the emergency room for constipation.  She has a history of constipation.  She is on no regular program.  The ER physician recommended Cait-Colace.    She was seen by neurology.  There was a plan to slowly increase her carbidopa/levodopa.  They have not followed the protocol.  Currently, she is on 1 tablet in the morning and 1 in the afternoon.  Her allopurinol has been decreased from 3 times daily to twice daily.  They have noticed a benefit with her tremor.  Her energy also seems improved.    Anxiety depression have been stable.  She still has some days where she is more down.    She complains of incontinence.  She has difficulty explaining but her symptoms seem most consistent with overactive bladder.  Her sister has similar problems and is taking some \"medicine\" which has been helpful.    Allergies: Augmentin [amoxicillin-pot clavulanate], Iodine, Mercury, and Other environmental    Current Outpatient Medications Ordered in Epic   Medication Sig Dispense Refill    mirabegron ER (MYRBETRIQ) 25 MG TABLET SR 24 HR Take 1 Tablet by mouth every day. 30 Tablet 3    carbidopa-levodopa (SINEMET)  MG Tab Take 1 Tablet by mouth 3 times a day for 360 days. Start according to clinic instructions. For Parkinson's 270 Tablet 3    SYNTHROID 50 MCG Tab Take 1 Tablet by mouth every day. 90 Tablet 3    ROPINIRole (REQUIP) 0.5 MG Tab TAKE 1 TABLET BY MOUTH THREE TIMES DAILY 270 Tablet 0    VITAMIN D PO Take  by mouth every day.      venlafaxine (EFFEXOR-XR) 150 MG extended-release capsule Take 1 capsule by mouth once daily 90 Capsule 3    denosumab (PROLIA) 60 MG/ML Solution Prefilled Syringe injection Inject 1 mL under the skin every 6 months. 1 mL 0     No current Epic-ordered " facility-administered medications on file.       Past medical history, social history and family history were reviewed from chart today    Review of systems: Per HPI.    All others negative.     Exam: /72 (BP Location: Left arm, Patient Position: Sitting, BP Cuff Size: Adult)   Pulse 87   Temp 36.5 °C (97.7 °F) (Temporal)   Resp 12   Wt 48.1 kg (106 lb)   SpO2 94%   General: Well-appearing. Well-developed. No signs of distress.  HEENT: Grossly normal. Oral cavity is pink and moist.   Neck: Supple without JVD or bruit.  Pulmonary: Clear with good breath sounds. Normal effort.  Cardiovascular: Regular. Carotid and radial pulses are intact.  Abdomen: Soft, nontender, nondistended. Spleen and liver are not enlarged.  Neurologic: Right greater than sign left resting tremor      Diagnosis:  1. Parkinson's disease with dyskinesia, unspecified whether manifestations fluctuate (HCC)        2. Chronic constipation        3. Situational anxiety        4. Other urinary incontinence        5. OAB (overactive bladder)            Assessment/plan:  I discussed her carbidopa levodopa.  In 1 week I encouraged her to add 1/2 tablet in the evening followed by 1 tablet 3 times daily.  Continue propranolol at twice daily.  Continue venlafaxine.  Trial of Metamucil for constipation.  Try adding Myrbetriq 25 mg for what sound like overactive bladder symptoms.    My total time spent caring for the patient on the day of the encounter was  greater than 30 minutes.   This includes obtaining history, reviewing chart, physical exam, patient education, reviewing outside records, placing orders, interpreting tests and coordinating care.    Portions of this note were completed using voice recognition software (Dragon Naturally speaking software) . Occasional transcription errors may have escaped proof reading. I have made every reasonable attempt to correct obvious errors, but I expect that there are errors of grammar and possibly  content that I did not discover before finalizing the note.

## 2025-02-03 RX ORDER — MIRABEGRON 25 MG/1
25 TABLET, FILM COATED, EXTENDED RELEASE ORAL DAILY
Qty: 90 TABLET | Refills: 3 | Status: SHIPPED | OUTPATIENT
Start: 2025-02-03

## 2025-02-24 RX ORDER — VENLAFAXINE HYDROCHLORIDE 150 MG/1
150 CAPSULE, EXTENDED RELEASE ORAL DAILY
Qty: 90 CAPSULE | Refills: 0 | Status: SHIPPED | OUTPATIENT
Start: 2025-02-24 | End: 2025-02-26

## 2025-02-26 RX ORDER — VENLAFAXINE HYDROCHLORIDE 150 MG/1
150 CAPSULE, EXTENDED RELEASE ORAL DAILY
Qty: 90 CAPSULE | Refills: 0 | Status: SHIPPED | OUTPATIENT
Start: 2025-02-26

## 2025-03-11 ENCOUNTER — TELEPHONE (OUTPATIENT)
Dept: NEUROLOGY | Facility: MEDICAL CENTER | Age: 80
End: 2025-03-11
Payer: MEDICARE

## 2025-03-14 ENCOUNTER — APPOINTMENT (OUTPATIENT)
Dept: NEUROLOGY | Facility: MEDICAL CENTER | Age: 80
End: 2025-03-14
Attending: INTERNAL MEDICINE
Payer: MEDICARE

## 2025-03-14 NOTE — PROGRESS NOTES
Goyo Jimenez, DO  Neurology, Movement Disorders [facility]  [address], [address]. [place] [address]  Phone: [phone], Fax: [phone]     ASSESSMENT / PLAN   Kimmy Diaz is a 79 y.o. RHD female presenting for Parkinson's disease    Parkinson's disease  Sx onset approx 2021, with right arm rest tremor and right ankle inversion. Has since progressed to bilateral rest tremor. Exam also notable for bradykinesia, rigidity, and dystonia. Having insufficient response to ropinirole 0.5mg TID. Higher doses risk sfx, michelle given her age. Will transition to levodopa. Consider Rytary or Crexont if n/v recurs.  PSCNN discussed    - START carbidopa/levodopa 25/100mg tablet:  Week 1: take ½ tab every evening   Week 2: take ½ tab twice a day  Week 3: take ½ tab three times a day  Week 4: take ½ tab morning and noon and 1 tab in the evening   Week 5: take ½ tab in the morning and 1 tab noon and evening  Week 6 and thereafter: take 1 tab three times a day.     Avoid proteins (nuts/diary/meat) for 30 min after taking medicine. If you have already eaten protein, wait 1 hour before taking medication.  If it causes nausea, take it with fruit or carbohydrates (cookie, bread, cracker, etc.)      - Ropinirole 0.5mg: continue 1 tablet, 3x/day for now. Consider stopping in future as it is causing fatigue.       Cervical dystonia  Right foot dystonia  Cervical dystonia noted on exam (right laterocollis and left torticollis), and right ankle inversion  - CTM, pending levodopa response  - discussed Botox if troublesome      REM sleep behavior disorder  - consider melatonin 5mg tablets if worsening. Max dose is 15mg per night. Take 1 hour before bedtime, at a consistent time each night. Look for label with USP certification.   If not staying asleep through the whole night, look for extended-release melatonin (REMfresh brand) online.       Anxiety  PHQ-9, C-SSRS: negative  - venlafaxine   - starting levodopa  may help      No orders of the defined types were placed in this encounter.    No follow-ups on file.    BILLING DOCUMENTATION:   Excluding time spent on procedures during visit, I spent 60 minutes reviewing the medical record, interviewing and examining the patient, discussing diagnosis and treatment, and coordinating care.              HISTORY OF PRESENT ILLNESS   Kimmy Diaz is a 79 y.o. RHD female presenting for Parkinson's disease    PMHx: lumbar decompression, right shoulder surgery    Here with Alonso,     Initial HPI 01/09/25  Sx onset approx 2021, with right arm rest tremor and right ankle inversion. Later developed hypophonia. Previously started on pramipexole with Dr. Cali 6/2023, but didn't start. PCP Dr. Ross started her on ropinirole 0.25mg TID which was better tolerated than carbidopa-levodopa 25/100: half-tab BID (sfx n/v).     Neuroleptics/pesticide exposure: --  Family history: --  No prior DaTscan.     Current Regimen  Ropinirole 0.5mg 3x/day  Latency: can't tell  Duration: can't tell  Efficacy: can't tell  Dyskinesia/Dystonia: right foot inversion  Sfx: drowsiness    ROS  Gait:  Feels unbalance    Orthostasis:   No issues    GI:   Fluctuates with diet, managed with high fiber diet    :   +urinary urgency and frequency    Speech/Swallow:   +hypophonia    Cognition:   Worse in AM, michelle with medications. Alonso has been helping her managing it    Mood:   +depression slight  +anxiety main issue, michelle with overly stimulation. Venlafaxine 150mg mild benefit    Hallucinations:   Sometimes thinks dog has black eyes but is blued    Sleep/RBD:   8-10 hours. Sleeps at 10PM.   +RBD: acts out dream, yelling. Freq: 1/week      Past Medical History:   Diagnosis Date    Acquired hypothyroidism 04/14/2023    medicated    Actinic keratoses 01/24/2019    Allergic rhinitis     Anesthesia 04/14/2023    PONV with tubal ligation    Anxiety     Cancer (HCC)     Skin    CATARACT 04/14/2023     Bilateral IOL    Gastroesophageal reflux disease with esophagitis 01/24/2019    Hemochromatosis     carrier    History of squamous cell carcinoma 01/24/2019    Hyperlipidemia     Osteoporosis     Other specified symptom associated with female genital organs     prolapse    Parkinson's disease (tremor, stiffness, slow motion, unstable posture) ([facility])     Pneumonia 04/14/2023    history of    PONV (postoperative nausea and vomiting) 04/14/2023    with tubal ligation    Psychiatric problem 04/14/2023    anxiety, medicated    Pure hypercholesterolemia 05/23/2019    Urinary bladder disorder     prolapse    Vaginal prolapse 01/24/2019    Vitamin D deficiency      Past Surgical History:   Procedure Laterality Date    LUMBAR DECOMPRESSION  5/1/2023    Procedure: POSTERIOR LEFT L4-5 LUMBAR INTERNAL DECOMPRESSION AND L5-S1 TRANSPEDICULAR DECOMPRESSION;  Surgeon: Nayan Reyes M.D.;  Location: SURGERY [facility];  Service: Neurosurgery    OTHER ORTHOPEDIC SURGERY Right 04/14/2023    shoulder repair    PB SHLDR ARTHROSCOP,SURG,W/ROTAT CUFF REPB Right 11/04/2020    Procedure: ARTHROSCOPY, SHOULDER, WITH ROTATOR CUFF REPAIR;  Surgeon: Svetlana Mcleod M.D.;  Location: SURGERY [facility];  Service: Orthopedics    PB ARTHROSCOPY SHOULDER SURGICAL BICEPS TENODES* Right 11/04/2020    Procedure: ARTHROSCOPY, SHOULDER, WITH BICEPS TENOTOMY;  Surgeon: Svetlana Mcleod M.D.;  Location: SURGERY [facility];  Service: Orthopedics    OTHER  2018    Mohs scalp    CATARACT EXTRACTION WITH IOL Bilateral 2016    SHOULDER ARTHROSCOPY W/ ROTATOR CUFF REPAIR  07/11/2012    Performed by SVETLANA MCLEOD at SURGERY [facility] ORS    BICEPS TENDON REPAIR  07/11/2012    Performed by SVETLANA MCLEOD at SURGERY [facility] ORS    SHOULDER DECOMPRESSION  07/11/2012    Performed by SVETLANA MCLEOD at SURGERY [facility] ORS    VAGINAL HYSTERECTOMY SCOPE TOTAL  08/24/2011    Performed by QAMAR COPELAND at SURGERY SAME DAY [facility] ORS    ANTERIOR  AND POSTERIOR REPAIR  08/24/2011    Performed by QAMAR COPELAND at SURGERY SAME DAY [facility] ORS    TUBAL LIGATION  1980    OTHER      wisdom  teeth     Family History   Problem Relation Age of Onset    Diabetes Other     Lung Disease Mother      Social History     Socioeconomic History    Marital status:      Spouse name: Not on file    Number of children: Not on file    Years of education: Not on file    Highest education level: Not on file   Occupational History    Not on file   Tobacco Use    Smoking status: Never    Smokeless tobacco: Never   Vaping Use    Vaping status: Never Used   Substance and Sexual Activity    Alcohol use: Yes     Comment: occasionally    Drug use: No    Sexual activity: Yes   Other Topics Concern    Not on file   Social History Narrative    Not on file     Social Drivers of Health     Financial Resource Strain: Not on file   Food Insecurity: Not on file   Transportation Needs: Not on file   Physical Activity: Not on file   Stress: Not on file   Social Connections: Not on file   Intimate Partner Violence: Not on file   Housing Stability: Not on file     Current Outpatient Medications   Medication    venlafaxine (EFFEXOR-XR) 150 MG extended-release capsule    ROPINIRole (REQUIP) 0.5 MG Tab    MYRBETRIQ 25 MG TABLET SR 24 HR    mirabegron ER (MYRBETRIQ) 25 MG TABLET SR 24 HR    carbidopa-levodopa (SINEMET)  MG Tab    SYNTHROID 50 MCG Tab    VITAMIN D PO    denosumab (PROLIA) 60 MG/ML Solution Prefilled Syringe injection     No current facility-administered medications for this visit.     Allergies   Allergen Reactions    Augmentin [Amoxicillin-Pot Clavulanate] Diarrhea     Severe diarrhea    Iodine Rash     rash    Mercury Rash     .    Other Environmental Runny Nose and Unspecified     Grasses, trees, animal dander, also causes drowsiness             DATA / RESULTS     MR-Brain without contrast 07/13/2020 --- reviewed, agree with report  [facility]    Cerebral  hemispheres: Cerebral hemispheres demonstrate mild central and peripheral atrophy. Lateral ventricles are slightly prominent. There are ventricles within the midline. No mass effect or midline shift noted. No significant age related deep white matter changes demonstrated. No subdural epidural fluid collections demonstrated.  Axial diffusion weighted images are normal.  Posterior fossa: The cerebellum is normal. There is no evidence of an Arnold-Chiari malformation. The brainstem is normal in appearance.  The internal auditory canals are well visualized and are normal in appearance as is the pituitary gland.  Sinuses: Normal.    Impression:  1. Mild cerebral atrophy with no evidence of significant age related deep white matter changes. Remainder of the exam normal. Atrophy is considered to be at the upper limits of normal for age.  2. Parkinsonism is clinically suspected. Further evaluation with Zack imaging may be helpful      25-Hydroxy   Vitamin D 25   Date Value Ref Range Status   09/12/2024 49 30 - 100 ng/mL Final     Comment:     Adult Ranges:   <20 ng/mL - Deficiency  20-29 ng/mL - Insufficiency   ng/mL - Sufficiency  Electrochemiluminescence binding assay performed using Roche she e  immunoassay analyzer.  The Elecsys Vitamin D total II assay is intended for  the quantitative determination of total 25 hydroxyvitamin D in human serum  and plasma. This assay is to be used as an aid in the assessment of vitamin  D sufficiency in adults.       TSH   Date Value Ref Range Status   01/16/2025 1.500 0.380 - 5.330 uIU/mL Final     Comment:     The 2011 American Thyroid Association (TERRELL) guidelines  recommended that the interpretation of thyroid function in  pregnancy be based on trimester specific reference ranges.    1st Trimester  0.100-2.500 mIU/L  2nd Trimester  0.200-3.000 mIU/L  3rd Trimester  0.300-3.500 mIU/L    These established reference ranges have not been validated  at [facility] [employer]        HIV 1/0/2   Date Value Ref Range Status   07/02/2012 see below Non Reactive Final     Comment:     Non Reactive:  Screen for Enhanced HIV 1/O/2 is NEGATIVE for  HIV-1, including group O, and HIV-2 antibodies.  A negative  test result at any point in the investigation of individual  subjects does not preclude the possibility of exposure to or  infection with HIV 1/O/2.     LDL   Date Value Ref Range Status   09/12/2024 131 (H) <100 mg/dL Final                OBJECTIVE      There were no vitals filed for this visit.    Physical Exam     Last dose of ropinirole taken this AM     General: NAD, appears stated age.      Mental status: Speech hypophonic. +hypomimia. Fund of knowledge is good.      Cranial Nerves:  CN2: PERRL. Visual fields are full to finger confrontation.   CN3/4/6: EOMI. There is no nystagmus.   CN5: V1-V3 intact to light touch    CN7: Symmetric face.   CN8: Hearing grossly intact.   CN9/10/12: Soft palate and uvula rise symmetrically. Tongue midline.   CN11: Shoulder shrug intact bilaterally.     Strength  Right Left   Shoulder Abduction  5 5   Elbow Flexion 5 5   Elbow Extension  5 5   Wrist Extension  5 5   Finger Extension  5 5   Hip Flexion  5 5   Knee Extension 5 5   Ankle Dorsiflexion  5 5     Deep Tendon Reflexes: 3+ biceps, brachioradialis, patella and 2+ ankles    Abnormal movements:    UPDRS Right Left   Finger tapping 1 1   Hand Movement 1 1   Toe Tapping 1 1   Leg Agility 1 1   Rigidity 2 2   Rest Tremor 2 1   Postural Tremor 1 1   Kinetic Tremor 1 1     +cervical dystonia: right laterocollis, left torticollis  +right foot dystonia: ankle inversion    No dyskinesias, tics, stereotypies, athetosis, akathisia, or chorea noted.      Sensory:   Quantitative tuning fork Right Left   Hands 8 8   Feet 7 7       Cerebellar: No dysmetria with FTN or heel-to-shin.    Gait:   Posture - stooped 20 deg  Base - narrow   Stride length - decreased   Arm swing - decreased, bilateral tremors. Right 1-3cm,  left 1cm   Speed - slow  Shuffling/freezing - none  U-Turn - 4-5 steps           PROCEDURE     N/A

## 2025-04-01 DIAGNOSIS — M81.0 OSTEOPOROSIS OF LOWER LEG WITHOUT PATHOLOGICAL FRACTURE: ICD-10-CM

## 2025-04-03 ENCOUNTER — OFFICE VISIT (OUTPATIENT)
Dept: NEUROLOGY | Facility: MEDICAL CENTER | Age: 80
End: 2025-04-03
Attending: PSYCHIATRY & NEUROLOGY
Payer: MEDICARE

## 2025-04-03 VITALS
HEIGHT: 60 IN | SYSTOLIC BLOOD PRESSURE: 124 MMHG | OXYGEN SATURATION: 97 % | DIASTOLIC BLOOD PRESSURE: 70 MMHG | WEIGHT: 106.7 LBS | BODY MASS INDEX: 20.95 KG/M2 | HEART RATE: 81 BPM | RESPIRATION RATE: 16 BRPM | TEMPERATURE: 97.7 F

## 2025-04-03 DIAGNOSIS — G24.9 DYSTONIA OF FOOT: ICD-10-CM

## 2025-04-03 DIAGNOSIS — G20.A1 PARKINSON'S DISEASE WITHOUT DYSKINESIA OR FLUCTUATING MANIFESTATIONS (HCC): ICD-10-CM

## 2025-04-03 DIAGNOSIS — G24.3 CERVICAL DYSTONIA: ICD-10-CM

## 2025-04-03 PROCEDURE — 3078F DIAST BP <80 MM HG: CPT | Performed by: INTERNAL MEDICINE

## 2025-04-03 PROCEDURE — G2211 COMPLEX E/M VISIT ADD ON: HCPCS | Performed by: INTERNAL MEDICINE

## 2025-04-03 PROCEDURE — 99214 OFFICE O/P EST MOD 30 MIN: CPT | Performed by: INTERNAL MEDICINE

## 2025-04-03 PROCEDURE — 99212 OFFICE O/P EST SF 10 MIN: CPT | Performed by: INTERNAL MEDICINE

## 2025-04-03 PROCEDURE — 3074F SYST BP LT 130 MM HG: CPT | Performed by: INTERNAL MEDICINE

## 2025-04-03 ASSESSMENT — FIBROSIS 4 INDEX: FIB4 SCORE: 2.91

## 2025-04-03 ASSESSMENT — PATIENT HEALTH QUESTIONNAIRE - PHQ9
5. POOR APPETITE OR OVEREATING: 0 - NOT AT ALL
CLINICAL INTERPRETATION OF PHQ2 SCORE: 1
SUM OF ALL RESPONSES TO PHQ QUESTIONS 1-9: 4

## 2025-04-03 NOTE — PROGRESS NOTES
Goyo Jimenez,   Neurology, Movement Disorders [facility]  [address], [address]. [place] [address]  Phone: [phone], Fax: [phone]     ASSESSMENT / PLAN   Kimmy Diaz is a 79 y.o. RHD female presenting for Parkinson's disease    Parkinson's disease  Sx onset approx 2021, with right arm rest tremor and right ankle inversion. Has since progressed to bilateral rest tremor. Exam also notable for bradykinesia, rigidity, and dystonia.     Having insufficient response to ropinirole 0.5mg TID. Higher doses risk sfx, michelle given her age. Will transition to levodopa.     PSCNN discussed     Morning Afternoon Evening   Week 1 Carbidopa-levodopa 0.5 tablet    Ropinirole 0.5 tablet Carbidopa-levodopa 0.5 tablet    Ropinirole 0.5 tablet Carbidopa-levodopa 1 tablet    Ropinirole 0.5 tablet   Week 2 Carbidopa-levodopa 0.5 tablet    Ropinirole 0.5 tablet Carbidopa-levodopa 1 tablet    -- Carbidopa-levodopa 1 tablet    Ropinirole 0.5 tablet   Week 3 Carbidopa-levodopa 1 tablet    Ropinirole 0.5 tablet Carbidopa-levodopa 1 tablet    -- Carbidopa-levodopa 1 tablet    --   Week 4 Carbidopa-levodopa 1 tablet Carbidopa-levodopa 1 tablet Carbidopa-levodopa 1 tablet       Cervical dystonia  Right foot dystonia  Cervical dystonia noted on exam (right laterocollis and left torticollis), and right ankle inversion  - CTM, pending levodopa response  - discussed Botox if troublesome      REM sleep behavior disorder  - consider melatonin 5mg tablets if worsening. Max dose is 15mg per night. Take 1 hour before bedtime, at a consistent time each night. Look for label with USP certification.   If not staying asleep through the whole night, look for extended-release melatonin (REMfresh brand) online.       Anxiety  PHQ-9, C-SSRS: negative  - venlafaxine   - increasing levodopa may help      No orders of the defined types were placed in this encounter.    Return in about 3 months (around 7/3/2025).    BILLING  DOCUMENTATION:   [facility] Level 4              HISTORY OF PRESENT ILLNESS   Kimmy Diaz is a 79 y.o. RHD female presenting for Parkinson's disease    PMHx: lumbar decompression, right shoulder surgery    Here with Alonso,     Initial HPI 01/09/25  Sx onset approx 2021, with right arm rest tremor and right ankle inversion. Later developed hypophonia. Previously recommended pramipexole with Dr. Cali 6/2023, but didn't start. PCP Dr. Ross started her on ropinirole 0.25mg TID which was better tolerated than carbidopa-levodopa 25/100: half-tab BID (sfx n/v).     No prior DaTscan.       Current Regimen  Ropinirole 0.5mg, 1 tab 3x/day  Carbidopa-levodopa 0.5 tab, 3 times day  Latency: 30 min  Duration: can't tell  Efficacy: tremors slightly better. Walking better.   Dyskinesia/Dystonia:   - right foot inversion. 04/03/25 less bothersome with levodopa.   Sfx: drowsiness and fatigue. No nausea      ROS  Gait:  Feels unbalance    Orthostasis:   No issues    GI:   Fluctuates with diet, managed with high fiber diet  Constipation better    :   +urinary urgency and frequency  04/03/25: better with Myrbetriq    Speech/Swallow:   +hypophonia    Cognition:   Worse in AM, michelle with medications. Alonso has been helping her managing it    Mood:   +depression slight  +anxiety main issue, michelle with overly stimulation. Venlafaxine 150mg mild benefit  04/03/25: anxiety better. PHQ-9 = 4, C-SSRS = neg    Hallucinations:   Sometimes thinks dog has black eyes but is blued    Sleep/RBD:   8-10 hours. Sleeps at 10PM.   +RBD: acts out dream, yelling. Freq: 1/week. 04/03/25: less frequent      Past Medical History:   Diagnosis Date    Acquired hypothyroidism 04/14/2023    medicated    Actinic keratoses 01/24/2019    Allergic rhinitis     Anesthesia 04/14/2023    PONV with tubal ligation    Anxiety     Cancer (HCC)     Skin    CATARACT 04/14/2023    Bilateral IOL    Gastroesophageal reflux disease with esophagitis 01/24/2019     Hemochromatosis     carrier    History of squamous cell carcinoma 01/24/2019    Hyperlipidemia     Osteoporosis     Other specified symptom associated with female genital organs     prolapse    Parkinson's disease (tremor, stiffness, slow motion, unstable posture) (HCC)     Pneumonia 04/14/2023    history of    PONV (postoperative nausea and vomiting) 04/14/2023    with tubal ligation    Psychiatric problem 04/14/2023    anxiety, medicated    Pure hypercholesterolemia 05/23/2019    Urinary bladder disorder     prolapse    Vaginal prolapse 01/24/2019    Vitamin D deficiency      Past Surgical History:   Procedure Laterality Date    LUMBAR DECOMPRESSION  5/1/2023    Procedure: POSTERIOR LEFT L4-5 LUMBAR INTERNAL DECOMPRESSION AND L5-S1 TRANSPEDICULAR DECOMPRESSION;  Surgeon: Nayan Reyes M.D.;  Location: SURGERY [facility];  Service: Neurosurgery    OTHER ORTHOPEDIC SURGERY Right 04/14/2023    shoulder repair    PB SHLDR ARTHROSCOP,SURG,W/ROTAT CUFF REPB Right 11/04/2020    Procedure: ARTHROSCOPY, SHOULDER, WITH ROTATOR CUFF REPAIR;  Surgeon: Svetlana Mcleod M.D.;  Location: SURGERY [facility];  Service: Orthopedics    PB ARTHROSCOPY SHOULDER SURGICAL BICEPS TENODES* Right 11/04/2020    Procedure: ARTHROSCOPY, SHOULDER, WITH BICEPS TENOTOMY;  Surgeon: Svetlana Mcleod M.D.;  Location: SURGERY [facility];  Service: Orthopedics    OTHER  2018    Mohs scalp    CATARACT EXTRACTION WITH IOL Bilateral 2016    SHOULDER ARTHROSCOPY W/ ROTATOR CUFF REPAIR  07/11/2012    Performed by SVETLANA MCLEOD at SURGERY [facility] ORS    BICEPS TENDON REPAIR  07/11/2012    Performed by SVETLANA MCLEOD at SURGERY [facility] ORS    SHOULDER DECOMPRESSION  07/11/2012    Performed by SVETLANA MCLEOD at SURGERY [facility] ORS    VAGINAL HYSTERECTOMY SCOPE TOTAL  08/24/2011    Performed by QAMAR COPELAND at SURGERY SAME DAY [facility] ORS    ANTERIOR AND POSTERIOR REPAIR  08/24/2011    Performed by QAMAR COPELAND at SURGERY  SAME DAY [facility] ORS    TUBAL LIGATION  1980    OTHER      wisdom  teeth     Family History   Problem Relation Age of Onset    Diabetes Other     Lung Disease Mother      Social History     Socioeconomic History    Marital status:      Spouse name: Not on file    Number of children: Not on file    Years of education: Not on file    Highest education level: Not on file   Occupational History    Not on file   Tobacco Use    Smoking status: Never    Smokeless tobacco: Never   Vaping Use    Vaping status: Never Used   Substance and Sexual Activity    Alcohol use: Yes     Comment: occasionally    Drug use: No    Sexual activity: Yes   Other Topics Concern    Not on file   Social History Narrative    Not on file     Social Drivers of Health     Financial Resource Strain: Not on file   Food Insecurity: Not on file   Transportation Needs: Not on file   Physical Activity: Not on file   Stress: Not on file   Social Connections: Not on file   Intimate Partner Violence: Not on file   Housing Stability: Not on file     Current Outpatient Medications   Medication    venlafaxine (EFFEXOR-XR) 150 MG extended-release capsule    mirabegron ER (MYRBETRIQ) 25 MG TABLET SR 24 HR    ROPINIRole (REQUIP) 0.5 MG Tab    carbidopa-levodopa (SINEMET)  MG Tab    SYNTHROID 50 MCG Tab    VITAMIN D PO    denosumab (PROLIA) 60 MG/ML Solution Prefilled Syringe injection    MYRBETRIQ 25 MG TABLET SR 24 HR     No current facility-administered medications for this visit.     Allergies   Allergen Reactions    Augmentin [Amoxicillin-Pot Clavulanate] Diarrhea     Severe diarrhea    Iodine Rash     rash    Mercury Rash     .    Other Environmental Runny Nose and Unspecified     Grasses, trees, animal dander, also causes drowsiness             DATA / RESULTS     MR-Brain without contrast 07/13/2020 --- reviewed, agree with report  [facility]  Cerebral hemispheres: Cerebral hemispheres demonstrate mild central and peripheral  atrophy. Lateral ventricles are slightly prominent. There are ventricles within the midline. No mass effect or midline shift noted. No significant age related deep white matter changes demonstrated. No subdural epidural fluid collections demonstrated.  Axial diffusion weighted images are normal.  Posterior fossa: The cerebellum is normal. There is no evidence of an Arnold-Chiari malformation. The brainstem is normal in appearance.  The internal auditory canals are well visualized and are normal in appearance as is the pituitary gland.  Sinuses: Normal.  Impression:  1. Mild cerebral atrophy with no evidence of significant age related deep white matter changes. Remainder of the exam normal. Atrophy is considered to be at the upper limits of normal for age.  2. Parkinsonism is clinically suspected. Further evaluation with Zack imaging may be helpful      25-Hydroxy   Vitamin D 25   Date Value Ref Range Status   09/12/2024 49 30 - 100 ng/mL Final     Comment:     Adult Ranges:   <20 ng/mL - Deficiency  20-29 ng/mL - Insufficiency   ng/mL - Sufficiency  Electrochemiluminescence binding assay performed using Roche she e  immunoassay analyzer.  The Elecsys Vitamin D total II assay is intended for  the quantitative determination of total 25 hydroxyvitamin D in human serum  and plasma. This assay is to be used as an aid in the assessment of vitamin  D sufficiency in adults.       TSH   Date Value Ref Range Status   01/16/2025 1.500 0.380 - 5.330 uIU/mL Final     Comment:     The 2011 American Thyroid Association (TERRELL) guidelines  recommended that the interpretation of thyroid function in  pregnancy be based on trimester specific reference ranges.    1st Trimester  0.100-2.500 mIU/L  2nd Trimester  0.200-3.000 mIU/L  3rd Trimester  0.300-3.500 mIU/L    These established reference ranges have not been validated  at [facility] [facility].       HIV 1/0/2   Date Value Ref Range Status   07/02/2012 see below Non  Reactive Final     Comment:     Non Reactive:  Screen for Enhanced HIV 1/O/2 is NEGATIVE for  HIV-1, including group O, and HIV-2 antibodies.  A negative  test result at any point in the investigation of individual  subjects does not preclude the possibility of exposure to or  infection with HIV 1/O/2.     LDL   Date Value Ref Range Status   09/12/2024 131 (H) <100 mg/dL Final                OBJECTIVE      Vitals:    04/03/25 1111   BP: 124/70   BP Location: Left arm   Patient Position: Sitting   BP Cuff Size: Adult   Pulse: 81   Resp: 16   Temp: 36.5 °C (97.7 °F)   TempSrc: Temporal   SpO2: 97%   Weight: 48.4 kg (106 lb 11.2 oz)   Height: 1.524 m (5')       Physical Exam  Last dose of ropinirole and carbidopa-levodopa taken this 9 AM (2.5 hours ago)    Mental status: Speech hypophonic. +hypomimia    Abnormal movements:    UPDRS Right Left   Finger tapping 1 2 worse   Hand Movement 1 1   Toe Tapping 2 worse 2 worse   Leg Agility 1 1   Rigidity 2 1 better   Rest Tremor 2 1   Postural Tremor 0 better 0 better   Kinetic Tremor 0 better 0 better     +cervical dystonia: right laterocollis, left torticollis  +right foot dystonia: ankle inversion (less persistent, better)      Cerebellar: No dysmetria with FTN    Gait:   Posture - stooped 20 deg  Base - narrow   Stride length - decreased   Arm swing - decreased, bilateral tremors. Right 1-3cm, left 1cm   Speed - slow  Shuffling/freezing - none  U-Turn - 4-5 steps           PROCEDURE     N/A

## 2025-04-03 NOTE — PATIENT INSTRUCTIONS
Morning Afternoon Evening   Week 1 Carbidopa-levodopa 0.5 tablet    Ropinirole 0.5 tablet Carbidopa-levodopa 0.5 tablet    Ropinirole 0.5 tablet Carbidopa-levodopa 1 tablet    Ropinirole 0.5 tablet   Week 2 Carbidopa-levodopa 0.5 tablet    Ropinirole 0.5 tablet Carbidopa-levodopa 1 tablet    -- Carbidopa-levodopa 1 tablet    Ropinirole 0.5 tablet   Week 3 Carbidopa-levodopa 1 tablet    Ropinirole 0.5 tablet Carbidopa-levodopa 1 tablet    -- Carbidopa-levodopa 1 tablet    --   Week 4 Carbidopa-levodopa 1 tablet Carbidopa-levodopa 1 tablet Carbidopa-levodopa 1 tablet

## 2025-06-04 ENCOUNTER — APPOINTMENT (OUTPATIENT)
Dept: RADIOLOGY | Facility: MEDICAL CENTER | Age: 80
DRG: 482 | End: 2025-06-04
Attending: EMERGENCY MEDICINE
Payer: MEDICARE

## 2025-06-04 ENCOUNTER — ANESTHESIA EVENT (OUTPATIENT)
Dept: SURGERY | Facility: MEDICAL CENTER | Age: 80
DRG: 482 | End: 2025-06-04
Payer: MEDICARE

## 2025-06-04 ENCOUNTER — HOSPITAL ENCOUNTER (INPATIENT)
Facility: MEDICAL CENTER | Age: 80
LOS: 6 days | DRG: 482 | End: 2025-06-10
Attending: EMERGENCY MEDICINE | Admitting: HOSPITALIST
Payer: MEDICARE

## 2025-06-04 DIAGNOSIS — R33.9 URINARY RETENTION: ICD-10-CM

## 2025-06-04 DIAGNOSIS — S72.002A CLOSED DISPLACED FRACTURE OF LEFT FEMORAL NECK (HCC): Primary | ICD-10-CM

## 2025-06-04 DIAGNOSIS — K21.00 GASTROESOPHAGEAL REFLUX DISEASE WITH ESOPHAGITIS WITHOUT HEMORRHAGE: Chronic | ICD-10-CM

## 2025-06-04 PROBLEM — Z71.89 ACP (ADVANCE CARE PLANNING): Status: ACTIVE | Noted: 2025-06-04

## 2025-06-04 PROBLEM — Z01.818 PREOPERATIVE EXAMINATION: Status: ACTIVE | Noted: 2025-06-04

## 2025-06-04 PROBLEM — D69.6 THROMBOCYTOPENIA (HCC): Status: ACTIVE | Noted: 2025-06-04

## 2025-06-04 PROBLEM — T14.8XXA FRACTURE: Status: ACTIVE | Noted: 2025-06-04

## 2025-06-04 LAB
ALBUMIN SERPL BCP-MCNC: 4.2 G/DL (ref 3.2–4.9)
ALBUMIN/GLOB SERPL: 1.6 G/DL
ALP SERPL-CCNC: 51 U/L (ref 30–99)
ALT SERPL-CCNC: <5 U/L (ref 2–50)
ANION GAP SERPL CALC-SCNC: 10 MMOL/L (ref 7–16)
APTT PPP: 24.2 SEC (ref 24.7–36)
AST SERPL-CCNC: 22 U/L (ref 12–45)
BASOPHILS # BLD AUTO: 0.4 % (ref 0–1.8)
BASOPHILS # BLD: 0.03 K/UL (ref 0–0.12)
BILIRUB SERPL-MCNC: 0.4 MG/DL (ref 0.1–1.5)
BUN SERPL-MCNC: 25 MG/DL (ref 8–22)
CALCIUM ALBUM COR SERPL-MCNC: 9.5 MG/DL (ref 8.5–10.5)
CALCIUM SERPL-MCNC: 9.7 MG/DL (ref 8.5–10.5)
CHLORIDE SERPL-SCNC: 102 MMOL/L (ref 96–112)
CO2 SERPL-SCNC: 25 MMOL/L (ref 20–33)
CREAT SERPL-MCNC: 0.79 MG/DL (ref 0.5–1.4)
EOSINOPHIL # BLD AUTO: 0.02 K/UL (ref 0–0.51)
EOSINOPHIL NFR BLD: 0.2 % (ref 0–6.9)
ERYTHROCYTE [DISTWIDTH] IN BLOOD BY AUTOMATED COUNT: 43.5 FL (ref 35.9–50)
GFR SERPLBLD CREATININE-BSD FMLA CKD-EPI: 76 ML/MIN/1.73 M 2
GLOBULIN SER CALC-MCNC: 2.7 G/DL (ref 1.9–3.5)
GLUCOSE SERPL-MCNC: 123 MG/DL (ref 65–99)
HCT VFR BLD AUTO: 41 % (ref 37–47)
HGB BLD-MCNC: 13.8 G/DL (ref 12–16)
IMM GRANULOCYTES # BLD AUTO: 0.03 K/UL (ref 0–0.11)
IMM GRANULOCYTES NFR BLD AUTO: 0.4 % (ref 0–0.9)
INR PPP: 0.98 (ref 0.87–1.13)
LYMPHOCYTES # BLD AUTO: 1.2 K/UL (ref 1–4.8)
LYMPHOCYTES NFR BLD: 14.7 % (ref 22–41)
MCH RBC QN AUTO: 30.9 PG (ref 27–33)
MCHC RBC AUTO-ENTMCNC: 33.7 G/DL (ref 32.2–35.5)
MCV RBC AUTO: 91.7 FL (ref 81.4–97.8)
MONOCYTES # BLD AUTO: 0.53 K/UL (ref 0–0.85)
MONOCYTES NFR BLD AUTO: 6.5 % (ref 0–13.4)
NEUTROPHILS # BLD AUTO: 6.38 K/UL (ref 1.82–7.42)
NEUTROPHILS NFR BLD: 77.8 % (ref 44–72)
NRBC # BLD AUTO: 0 K/UL
NRBC BLD-RTO: 0 /100 WBC (ref 0–0.2)
PLATELET # BLD AUTO: 163 K/UL (ref 164–446)
PMV BLD AUTO: 11.5 FL (ref 9–12.9)
POTASSIUM SERPL-SCNC: 4.6 MMOL/L (ref 3.6–5.5)
PROT SERPL-MCNC: 6.9 G/DL (ref 6–8.2)
PROTHROMBIN TIME: 13.2 SEC (ref 12–14.6)
RBC # BLD AUTO: 4.47 M/UL (ref 4.2–5.4)
SODIUM SERPL-SCNC: 137 MMOL/L (ref 135–145)
WBC # BLD AUTO: 8.2 K/UL (ref 4.8–10.8)

## 2025-06-04 PROCEDURE — 71101 X-RAY EXAM UNILAT RIBS/CHEST: CPT | Mod: LT

## 2025-06-04 PROCEDURE — 72170 X-RAY EXAM OF PELVIS: CPT

## 2025-06-04 PROCEDURE — 99285 EMERGENCY DEPT VISIT HI MDM: CPT

## 2025-06-04 PROCEDURE — 36415 COLL VENOUS BLD VENIPUNCTURE: CPT

## 2025-06-04 PROCEDURE — A9270 NON-COVERED ITEM OR SERVICE: HCPCS | Performed by: HOSPITALIST

## 2025-06-04 PROCEDURE — 85730 THROMBOPLASTIN TIME PARTIAL: CPT

## 2025-06-04 PROCEDURE — 700111 HCHG RX REV CODE 636 W/ 250 OVERRIDE (IP): Mod: JZ | Performed by: EMERGENCY MEDICINE

## 2025-06-04 PROCEDURE — 94760 N-INVAS EAR/PLS OXIMETRY 1: CPT

## 2025-06-04 PROCEDURE — 99223 1ST HOSP IP/OBS HIGH 75: CPT | Mod: 25,AI | Performed by: HOSPITALIST

## 2025-06-04 PROCEDURE — 85025 COMPLETE CBC W/AUTO DIFF WBC: CPT

## 2025-06-04 PROCEDURE — 770001 HCHG ROOM/CARE - MED/SURG/GYN PRIV*

## 2025-06-04 PROCEDURE — 80053 COMPREHEN METABOLIC PANEL: CPT

## 2025-06-04 PROCEDURE — 96374 THER/PROPH/DIAG INJ IV PUSH: CPT

## 2025-06-04 PROCEDURE — 85610 PROTHROMBIN TIME: CPT

## 2025-06-04 PROCEDURE — 700102 HCHG RX REV CODE 250 W/ 637 OVERRIDE(OP): Performed by: HOSPITALIST

## 2025-06-04 PROCEDURE — 73552 X-RAY EXAM OF FEMUR 2/>: CPT | Mod: LT

## 2025-06-04 PROCEDURE — 700105 HCHG RX REV CODE 258: Performed by: EMERGENCY MEDICINE

## 2025-06-04 PROCEDURE — 99497 ADVNCD CARE PLAN 30 MIN: CPT | Performed by: HOSPITALIST

## 2025-06-04 PROCEDURE — 96375 TX/PRO/DX INJ NEW DRUG ADDON: CPT

## 2025-06-04 RX ORDER — CARBIDOPA AND LEVODOPA 25; 100 MG/1; MG/1
1 TABLET ORAL 3 TIMES DAILY
Status: DISCONTINUED | OUTPATIENT
Start: 2025-06-04 | End: 2025-06-10 | Stop reason: HOSPADM

## 2025-06-04 RX ORDER — HYDROMORPHONE HYDROCHLORIDE 1 MG/ML
0.25 INJECTION, SOLUTION INTRAMUSCULAR; INTRAVENOUS; SUBCUTANEOUS
Status: DISCONTINUED | OUTPATIENT
Start: 2025-06-04 | End: 2025-06-10 | Stop reason: HOSPADM

## 2025-06-04 RX ORDER — CALCIUM CARBONATE 500 MG/1
500 TABLET, CHEWABLE ORAL 3 TIMES DAILY PRN
Status: DISCONTINUED | OUTPATIENT
Start: 2025-06-04 | End: 2025-06-10 | Stop reason: HOSPADM

## 2025-06-04 RX ORDER — CARBIDOPA AND LEVODOPA 25; 100 MG/1; MG/1
1 TABLET ORAL 3 TIMES DAILY
Status: DISCONTINUED | OUTPATIENT
Start: 2025-06-04 | End: 2025-06-04

## 2025-06-04 RX ORDER — VENLAFAXINE HYDROCHLORIDE 75 MG/1
150 CAPSULE, EXTENDED RELEASE ORAL DAILY
Status: DISCONTINUED | OUTPATIENT
Start: 2025-06-05 | End: 2025-06-10 | Stop reason: HOSPADM

## 2025-06-04 RX ORDER — ONDANSETRON 2 MG/ML
4 INJECTION INTRAMUSCULAR; INTRAVENOUS EVERY 4 HOURS PRN
Status: DISCONTINUED | OUTPATIENT
Start: 2025-06-04 | End: 2025-06-10 | Stop reason: HOSPADM

## 2025-06-04 RX ORDER — AMOXICILLIN 250 MG
2 CAPSULE ORAL EVERY EVENING
Status: DISCONTINUED | OUTPATIENT
Start: 2025-06-04 | End: 2025-06-10 | Stop reason: HOSPADM

## 2025-06-04 RX ORDER — SODIUM CHLORIDE 9 MG/ML
1000 INJECTION, SOLUTION INTRAVENOUS ONCE
Status: COMPLETED | OUTPATIENT
Start: 2025-06-04 | End: 2025-06-04

## 2025-06-04 RX ORDER — ONDANSETRON 2 MG/ML
4 INJECTION INTRAMUSCULAR; INTRAVENOUS ONCE
Status: COMPLETED | OUTPATIENT
Start: 2025-06-04 | End: 2025-06-04

## 2025-06-04 RX ORDER — ACETAMINOPHEN 325 MG/1
650 TABLET ORAL EVERY 6 HOURS PRN
Status: DISCONTINUED | OUTPATIENT
Start: 2025-06-04 | End: 2025-06-10 | Stop reason: HOSPADM

## 2025-06-04 RX ORDER — OXYCODONE HYDROCHLORIDE 5 MG/1
5 TABLET ORAL
Refills: 0 | Status: DISCONTINUED | OUTPATIENT
Start: 2025-06-04 | End: 2025-06-10 | Stop reason: HOSPADM

## 2025-06-04 RX ORDER — OXYCODONE HYDROCHLORIDE 5 MG/1
2.5 TABLET ORAL
Refills: 0 | Status: DISCONTINUED | OUTPATIENT
Start: 2025-06-04 | End: 2025-06-10 | Stop reason: HOSPADM

## 2025-06-04 RX ORDER — ONDANSETRON 4 MG/1
4 TABLET, ORALLY DISINTEGRATING ORAL EVERY 4 HOURS PRN
Status: DISCONTINUED | OUTPATIENT
Start: 2025-06-04 | End: 2025-06-10 | Stop reason: HOSPADM

## 2025-06-04 RX ORDER — LEVOTHYROXINE SODIUM 50 UG/1
50 TABLET ORAL DAILY
Status: DISCONTINUED | OUTPATIENT
Start: 2025-06-05 | End: 2025-06-10 | Stop reason: HOSPADM

## 2025-06-04 RX ORDER — MORPHINE SULFATE 4 MG/ML
4 INJECTION INTRAVENOUS ONCE
Status: COMPLETED | OUTPATIENT
Start: 2025-06-04 | End: 2025-06-04

## 2025-06-04 RX ORDER — ACETAMINOPHEN 500 MG
500 TABLET ORAL EVERY 6 HOURS PRN
COMMUNITY

## 2025-06-04 RX ADMIN — ONDANSETRON 4 MG: 2 INJECTION INTRAMUSCULAR; INTRAVENOUS at 13:42

## 2025-06-04 RX ADMIN — OXYCODONE 5 MG: 5 TABLET ORAL at 21:12

## 2025-06-04 RX ADMIN — OXYCODONE 2.5 MG: 5 TABLET ORAL at 17:30

## 2025-06-04 RX ADMIN — SODIUM CHLORIDE 1000 ML: 9 INJECTION, SOLUTION INTRAVENOUS at 13:37

## 2025-06-04 RX ADMIN — CARBIDOPA AND LEVODOPA 1 TABLET: 25; 100 TABLET ORAL at 17:30

## 2025-06-04 RX ADMIN — CARBIDOPA AND LEVODOPA 1 TABLET: 25; 100 TABLET ORAL at 20:59

## 2025-06-04 RX ADMIN — MORPHINE SULFATE 4 MG: 4 INJECTION INTRAVENOUS at 13:38

## 2025-06-04 SDOH — ECONOMIC STABILITY: TRANSPORTATION INSECURITY
IN THE PAST 12 MONTHS, HAS LACK OF RELIABLE TRANSPORTATION KEPT YOU FROM MEDICAL APPOINTMENTS, MEETINGS, WORK OR FROM GETTING THINGS NEEDED FOR DAILY LIVING?: PATIENT DECLINED

## 2025-06-04 SDOH — ECONOMIC STABILITY: TRANSPORTATION INSECURITY
IN THE PAST 12 MONTHS, HAS THE LACK OF TRANSPORTATION KEPT YOU FROM MEDICAL APPOINTMENTS OR FROM GETTING MEDICATIONS?: PATIENT DECLINED

## 2025-06-04 ASSESSMENT — ENCOUNTER SYMPTOMS
FALLS: 1
FLANK PAIN: 0
STRIDOR: 0
MYALGIAS: 0
COUGH: 0
BRUISES/BLEEDS EASILY: 0
ABDOMINAL PAIN: 0
FOCAL WEAKNESS: 0
FEVER: 0
CHILLS: 0
NERVOUS/ANXIOUS: 0
SHORTNESS OF BREATH: 0
EYE REDNESS: 0
VOMITING: 0
EYE DISCHARGE: 0

## 2025-06-04 ASSESSMENT — PAIN SCALES - PAIN ASSESSMENT IN ADVANCED DEMENTIA (PAINAD)
FACIALEXPRESSION: FACIAL GRIMACING
BREATHING: NORMAL
FACIALEXPRESSION: SMILING OR INEXPRESSIVE
BODYLANGUAGE: RELAXED
BREATHING: NORMAL
TOTALSCORE: 7
NEGVOCALIZATION: OCCASIONAL MOAN/GROAN, LOW SPEECH, NEGATIVE/DISAPPROVING QUALITY
CONSOLABILITY: UNABLE TO CONSOLE, DISTRACT OR REASSURE
TOTALSCORE: 0
CONSOLABILITY: NO NEED TO CONSOLE
BODYLANGUAGE: RIGID, FISTS CLENCHED, KNEES UP, PUSHING/PULLING AWAY, STRIKES OUT

## 2025-06-04 ASSESSMENT — PAIN DESCRIPTION - PAIN TYPE
TYPE: ACUTE PAIN

## 2025-06-04 ASSESSMENT — COGNITIVE AND FUNCTIONAL STATUS - GENERAL
DAILY ACTIVITIY SCORE: 12
TOILETING: A LOT
EATING MEALS: A LITTLE
PERSONAL GROOMING: A LITTLE
STANDING UP FROM CHAIR USING ARMS: TOTAL
SUGGESTED CMS G CODE MODIFIER DAILY ACTIVITY: CL
TURNING FROM BACK TO SIDE WHILE IN FLAT BAD: A LOT
WALKING IN HOSPITAL ROOM: TOTAL
MOVING TO AND FROM BED TO CHAIR: A LOT
SUGGESTED CMS G CODE MODIFIER MOBILITY: CM
HELP NEEDED FOR BATHING: TOTAL
CLIMB 3 TO 5 STEPS WITH RAILING: TOTAL
MOVING FROM LYING ON BACK TO SITTING ON SIDE OF FLAT BED: A LOT
MOBILITY SCORE: 9
DRESSING REGULAR LOWER BODY CLOTHING: TOTAL
DRESSING REGULAR UPPER BODY CLOTHING: A LOT

## 2025-06-04 ASSESSMENT — SOCIAL DETERMINANTS OF HEALTH (SDOH)
WITHIN THE PAST 12 MONTHS, YOU WORRIED THAT YOUR FOOD WOULD RUN OUT BEFORE YOU GOT THE MONEY TO BUY MORE: PATIENT DECLINED
WITHIN THE LAST YEAR, HAVE YOU BEEN KICKED, HIT, SLAPPED, OR OTHERWISE PHYSICALLY HURT BY YOUR PARTNER OR EX-PARTNER?: NO
IN THE PAST 12 MONTHS, HAS THE ELECTRIC, GAS, OIL, OR WATER COMPANY THREATENED TO SHUT OFF SERVICE IN YOUR HOME?: PATIENT DECLINED
WITHIN THE LAST YEAR, HAVE YOU BEEN HUMILIATED OR EMOTIONALLY ABUSED IN OTHER WAYS BY YOUR PARTNER OR EX-PARTNER?: NO
WITHIN THE LAST YEAR, HAVE TO BEEN RAPED OR FORCED TO HAVE ANY KIND OF SEXUAL ACTIVITY BY YOUR PARTNER OR EX-PARTNER?: NO
WITHIN THE PAST 12 MONTHS, THE FOOD YOU BOUGHT JUST DIDN'T LAST AND YOU DIDN'T HAVE MONEY TO GET MORE: PATIENT DECLINED
WITHIN THE LAST YEAR, HAVE YOU BEEN AFRAID OF YOUR PARTNER OR EX-PARTNER?: NO

## 2025-06-04 ASSESSMENT — LIFESTYLE VARIABLES
EVER HAD A DRINK FIRST THING IN THE MORNING TO STEADY YOUR NERVES TO GET RID OF A HANGOVER: NO
EVER FELT BAD OR GUILTY ABOUT YOUR DRINKING: NO
ALCOHOL_USE: YES
HAVE YOU EVER FELT YOU SHOULD CUT DOWN ON YOUR DRINKING: NO
TOTAL SCORE: 0
TOTAL SCORE: 0
CONSUMPTION TOTAL: NEGATIVE
HOW MANY TIMES IN THE PAST YEAR HAVE YOU HAD 5 OR MORE DRINKS IN A DAY: 0
HAVE PEOPLE ANNOYED YOU BY CRITICIZING YOUR DRINKING: NO
ON A TYPICAL DAY WHEN YOU DRINK ALCOHOL HOW MANY DRINKS DO YOU HAVE: 1
TOTAL SCORE: 0
AVERAGE NUMBER OF DAYS PER WEEK YOU HAVE A DRINK CONTAINING ALCOHOL: 1

## 2025-06-04 ASSESSMENT — FIBROSIS 4 INDEX
FIB4 SCORE: 5.03
FIB4 SCORE: 5.03

## 2025-06-04 ASSESSMENT — PATIENT HEALTH QUESTIONNAIRE - PHQ9
1. LITTLE INTEREST OR PLEASURE IN DOING THINGS: NOT AT ALL
2. FEELING DOWN, DEPRESSED, IRRITABLE, OR HOPELESS: NOT AT ALL
SUM OF ALL RESPONSES TO PHQ9 QUESTIONS 1 AND 2: 0

## 2025-06-04 NOTE — ED PROVIDER NOTES
ED Provider Note    CHIEF COMPLAINT  Chief Complaint   Patient presents with    Hip Pain    GLF     EXTERNAL RECORDS REVIEWED  Patient was last seen by neurology April 2025 for Parkinson's disease.  She was last seen in the emergency department January 2025 for constipation.  She does also have a history of hypothyroidism    HPI/ROS  LIMITATION TO HISTORY   Select: : None  OUTSIDE HISTORIAN(S):  Family at bedside    Kimmy Diaz is a 80 y.o. female who presents to the Emergency Department with left hip pain.  Patient was out hiking on a trail when she slipped and fell backwards on to her left hip.  This occurred just prior to arrival.  Fall seemed completely mechanical in nature.  She was not dizzy prior to the event.  She was unable to get up and bear any weight or ambulate following the event.  Pain is mainly localized to the left hip and she is unable to move it because of pain.  The patient and her  both deny any head trauma or loss of consciousness.  Patient is not on any blood thinners or aspirin.  She is currently taking medications for Parkinson's and hypothyroidism.    PAST MEDICAL HISTORY  Past Medical History[1]     SURGICAL HISTORY  Past Surgical History[2]     FAMILY HISTORY  Family History   Problem Relation Age of Onset    Diabetes Other     Lung Disease Mother        SOCIAL HISTORY   reports that she has never smoked. She has never used smokeless tobacco. She reports current alcohol use. She reports that she does not use drugs.    CURRENT MEDICATIONS  Current Discharge Medication List        CONTINUE these medications which have NOT CHANGED    Details   acetaminophen (TYLENOL) 500 MG Tab Take 500 mg by mouth every 6 hours as needed. Indications: Pain      venlafaxine (EFFEXOR-XR) 150 MG extended-release capsule Take 1 capsule by mouth once daily  Qty: 90 Capsule, Refills: 0      carbidopa-levodopa (SINEMET)  MG Tab Take 1 Tablet by mouth 3 times a day for 360 days. Start  according to clinic instructions. For Parkinson's  Qty: 270 Tablet, Refills: 3    Associated Diagnoses: Parkinson's disease without dyskinesia or fluctuating manifestations (HCC)      SYNTHROID 50 MCG Tab Take 1 Tablet by mouth every day.  Qty: 90 Tablet, Refills: 3    Associated Diagnoses: Hypothyroidism, unspecified type      VITAMIN D PO Take 1 Capsule by mouth every evening.      MYRBETRIQ 25 MG TABLET SR 24 HR Take 1 Tablet by mouth every day.  Qty: 90 Tablet, Refills: 3      denosumab (PROLIA) 60 MG/ML Solution Prefilled Syringe injection Inject 1 mL under the skin every 6 months.  Qty: 1 mL, Refills: 0    Associated Diagnoses: Age-related osteoporosis without current pathological fracture             ALLERGIES  Augmentin [amoxicillin-pot clavulanate], Iodine, Mercury, and Other environmental    PHYSICAL EXAM  BP (!) 145/75   Pulse 87   Temp 36.4 °C (97.6 °F) (Temporal)   Resp 17   Ht 1.524 m (5')   Wt 49 kg (108 lb 0.4 oz)   SpO2 97%      Constitutional: Nontoxic appearing. Alert in no apparent distress.  HENT: Normocephalic, Atraumatic. Bilateral external ears normal. Nose normal.  Dry mucous membranes.  Oropharynx clear.  Eyes: Pupils are equal and reactive. Conjunctiva normal.   Neck: Supple, full range of motion  Heart: Regular rate and rhythm.  No murmurs.  Palpable DP pulses bilaterally.  Lungs: No respiratory distress, normal work of breathing. Lungs clear to auscultation bilaterally.  Mild tenderness over the left anterior chest wall.  Abdomen Soft, no distention.  No tenderness to palpation.  Musculoskeletal: Atraumatic. No obvious deformities noted.  No lower extremity edema.  Patient holding the left hip in full flexion and unable to range or straighten at all due to pain.  No midline thoracic or lumbar back tenderness.  Skin: Warm, Dry.  No erythema, No rash.   Neurologic: Alert and oriented x3. Moving all extremities spontaneously without focal deficits.  Chronic resting tremor  noted.  Psychiatric: Affect normal, Mood normal, Appears appropriate and not intoxicated.      DIAGNOSTIC STUDIES / PROCEDURES        LABS  Labs Reviewed   CBC WITH DIFFERENTIAL - Abnormal; Notable for the following components:       Result Value    Platelet Count 163 (*)     Neutrophils-Polys 77.80 (*)     Lymphocytes 14.70 (*)     All other components within normal limits   COMP METABOLIC PANEL - Abnormal; Notable for the following components:    Glucose 123 (*)     Bun 25 (*)     All other components within normal limits   APTT - Abnormal; Notable for the following components:    APTT 24.2 (*)     All other components within normal limits   PROTHROMBIN TIME   ESTIMATED GFR         RADIOLOGY  I have independently interpreted the diagnostic imaging associated with this visit and am waiting the final reading from the radiologist.   My preliminary interpretation is as follows: Left femoral neck fracture    Radiologist interpretation:  [id number] (WITH 1-VIEW CXR) LEFT   Final Result      Normal rib series.      DX-FEMUR-2+ LEFT   Final Result      Left femoral neck fracture with impaction and angulation. There is also a component of fracture extending into the greater and lesser trochanters.      DX-PELVIS-1 OR 2 VIEWS   Final Result      Left femoral neck fracture with impaction and angulation. There is a component seen extending into the lesser and greater trochanters as well.            COURSE & MEDICAL DECISION MAKING      ASSESSMENT, COURSE AND PLAN  Care Narrative: Elderly patient who presents with left hip pain after a ground-level mechanical fall today.  She has normal vital signs on arrival.  No other traumatic injury were identified.  I did consider further diagnostic imaging however there was no history of head trauma.  She has no midline tenderness to suggest spinal fracture.  She had some mild tenderness over the left anterior chest wall however x-ray does not show evidence of rib fracture or  pneumothorax.  X-ray does show evidence of a left femoral neck/trochanter fracture.  There is no evidence of associated neurovascular compromise.  Labs do not show leukocytosis, renal dysfunction or electrolyte abnormality.` She does have mild chronic thrombocytopenia however no other evidence of coagulopathy.    I discussed the patient with Dr. Sylvester, orthopedic surgery, who is recommending admission for operative repair in the morning.    Upon reassessment, patient is resting comfortably with normal vital signs.  No new complaints at this time.  Discussed results with patient and/or family as well as importance of primary care follow up.  Patient understands plan of care and strict return precautions for new or changing symptoms.       ADDITIONAL PROBLEM LIST  Problem #1: Left femoral neck fracture -plan for admission for operative repair with orthopedic surgery in the morning      DISPOSITION AND DISCUSSIONS  I have discussed management of the patient with the following physicians and AGUSTIN's:    Dr. Sylvester, orthopedic surgery  Dr. Jack, hospitalist      Escalation of care considered, and ultimately not performed:diagnostic imaging    DISPOSITION:  Patient will be hospitalized by Dr. Jack in stable condition.      FINAL DIAGNOSIS  1. Closed displaced fracture of left femoral neck (HCC)               [1]   Past Medical History:  Diagnosis Date    Acquired hypothyroidism 04/14/2023    medicated    Actinic keratoses 01/24/2019    Allergic rhinitis     Anesthesia 04/14/2023    PONV with tubal ligation    Anxiety     Cancer (HCC)     Skin    CATARACT 04/14/2023    Bilateral IOL    Gastroesophageal reflux disease with esophagitis 01/24/2019    Hemochromatosis     carrier    History of squamous cell carcinoma 01/24/2019    Hyperlipidemia     Osteoporosis     Other specified symptom associated with female genital organs     prolapse    Parkinson's disease (tremor, stiffness, slow motion, unstable posture)  (HCC)     Pneumonia 04/14/2023    history of    PONV (postoperative nausea and vomiting) 04/14/2023    with tubal ligation    Psychiatric problem 04/14/2023    anxiety, medicated    Pure hypercholesterolemia 05/23/2019    Urinary bladder disorder     prolapse    Vaginal prolapse 01/24/2019    Vitamin D deficiency    [2]   Past Surgical History:  Procedure Laterality Date    LUMBAR DECOMPRESSION  5/1/2023    Procedure: POSTERIOR LEFT L4-5 LUMBAR INTERNAL DECOMPRESSION AND L5-S1 TRANSPEDICULAR DECOMPRESSION;  Surgeon: Nayan Reyes M.D.;  Location: SURGERY [facility];  Service: Neurosurgery    OTHER ORTHOPEDIC SURGERY Right 04/14/2023    shoulder repair    PB SHLDR ARTHROSCOP,SURG,W/ROTAT CUFF REPB Right 11/04/2020    Procedure: ARTHROSCOPY, SHOULDER, WITH ROTATOR CUFF REPAIR;  Surgeon: Svetlana Mcleod M.D.;  Location: SURGERY [facility];  Service: Orthopedics    PB ARTHROSCOPY SHOULDER SURGICAL BICEPS TENODES* Right 11/04/2020    Procedure: ARTHROSCOPY, SHOULDER, WITH BICEPS TENOTOMY;  Surgeon: Svetlana Mcleod M.D.;  Location: SURGERY [facility];  Service: Orthopedics    OTHER  2018    Mohs scalp    CATARACT EXTRACTION WITH IOL Bilateral 2016    SHOULDER ARTHROSCOPY W/ ROTATOR CUFF REPAIR  07/11/2012    Performed by SVETLANA MCLEOD at SURGERY [facility] ORS    BICEPS TENDON REPAIR  07/11/2012    Performed by SVETLANA MCLEOD at SURGERY [facility] ORS    SHOULDER DECOMPRESSION  07/11/2012    Performed by SVETLANA MCLEOD at SURGERY [facility] ORS    VAGINAL HYSTERECTOMY SCOPE TOTAL  08/24/2011    Performed by QAMAR COPELAND at SURGERY SAME DAY [facility] ORS    ANTERIOR AND POSTERIOR REPAIR  08/24/2011    Performed by QAMAR COPELAND at SURGERY SAME DAY [facility] ORS    TUBAL LIGATION  1980    OTHER      wisdom  teeth

## 2025-06-04 NOTE — H&P
Hospital Medicine History & Physical Note    Date of Service  6/4/2025    Primary Care Physician  Eldon Ross M.D.    Consultants  Dr. Nga Lozano    Code Status  Full Code    Chief Complaint  Chief Complaint   Patient presents with    Hip Pain    GL     History of Presenting Illness  Kimmy Diaz is a 80 y.o. female with a past medical history of Parkinson's disease, and hypothyroidism who presented 6/4/2025 with hip pain after a fall.  Pain is severe rated 10/10.  The pain is worse with movement.  Patient tripped and fell as she was hiking on a trail.  She denies hitting her head or losing consciousness.    I discussed the plan of care with emergency physician, the patient and patient family present at bedside in the emergency room.    Review of Systems  Review of Systems   Constitutional:  Positive for malaise/fatigue. Negative for chills and fever.   Eyes:  Negative for discharge and redness.   Respiratory:  Negative for cough, shortness of breath and stridor.    Cardiovascular:  Negative for chest pain and leg swelling.   Gastrointestinal:  Negative for abdominal pain and vomiting.   Genitourinary:  Negative for flank pain.   Musculoskeletal:  Positive for falls and joint pain. Negative for myalgias.   Skin: Negative.    Neurological:  Negative for focal weakness.   Endo/Heme/Allergies:  Does not bruise/bleed easily.   Psychiatric/Behavioral:  The patient is not nervous/anxious.      Past Medical History   has a past medical history of Acquired hypothyroidism (04/14/2023), Actinic keratoses (01/24/2019), Allergic rhinitis, Anesthesia (04/14/2023), Anxiety, Cancer ([facility]), CATARACT (04/14/2023), Gastroesophageal reflux disease with esophagitis (01/24/2019), Hemochromatosis, History of squamous cell carcinoma (01/24/2019), Hyperlipidemia, Osteoporosis, Other specified symptom associated with female genital organs, Parkinson's disease (tremor, stiffness, slow motion, unstable posture) ([facility]),  Pneumonia (04/14/2023), PONV (postoperative nausea and vomiting) (04/14/2023), Psychiatric problem (04/14/2023), Pure hypercholesterolemia (05/23/2019), Urinary bladder disorder, Vaginal prolapse (01/24/2019), and Vitamin D deficiency.    Surgical History   has a past surgical history that includes tubal ligation (1980); other; vaginal hysterectomy scope total (08/24/2011); anterior and posterior repair (08/24/2011); shoulder arthroscopy w/ rotator cuff repair (07/11/2012); biceps tendon repair (07/11/2012); shoulder decompression (07/11/2012); cataract extraction with iol (Bilateral, 2016); other (2018); pr shldr arthroscop,surg,w/rotat cuff repr (Right, 11/04/2020); pr arthroscopy shoulder surgical biceps tenodes* (Right, 11/04/2020); other orthopedic surgery (Right, 04/14/2023); and lumbar decompression (5/1/2023).     Family History  family history includes Diabetes in an other family member; Lung Disease in her mother.      Social History   reports that she has never smoked. She has never used smokeless tobacco. She reports current alcohol use. She reports that she does not use drugs.    Allergies  Allergies[1]    Medications  Prior to Admission Medications   Prescriptions Last Dose Informant Patient Reported? Taking?   MYRBETRIQ 25 MG TABLET SR 24 HR Not Taking Significant Other No No   Sig: Take 1 Tablet by mouth every day.   Patient not taking: Reported on 6/4/2025   SYNTHROID 50 MCG Tab 6/4/2025 at 10:00 AM Significant Other No Yes   Sig: Take 1 Tablet by mouth every day.   VITAMIN D PO 6/3/2025 at  7:00 PM Significant Other Yes Yes   Sig: Take 1 Capsule by mouth every evening.   acetaminophen (TYLENOL) 500 MG Tab 5/28/2025 Significant Other Yes Yes   Sig: Take 500 mg by mouth every 6 hours as needed. Indications: Pain   carbidopa-levodopa (SINEMET)  MG Tab 6/4/2025 at 10:00 AM Significant Other No Yes   Sig: Take 1 Tablet by mouth 3 times a day for 360 days. Start according to clinic instructions.  For Parkinson's   Patient taking differently: Take 1 Tablet by mouth 3 times a day. Start according to clinic instructions. For Parkinson's  Per  reports no set times to take this medication   denosumab (PROLIA) 60 MG/ML Solution Prefilled Syringe injection  Significant Other No No   Sig: Inject 1 mL under the skin every 6 months.   Patient taking differently: Inject 60 mg under the skin every 6 months. 2/2025   venlafaxine (EFFEXOR-XR) 150 MG extended-release capsule 6/4/2025 at 10:00 AM Significant Other No Yes   Sig: Take 1 capsule by mouth once daily      Facility-Administered Medications: None     Physical Exam  Pulse:  [77] 77  BP: (135)/(66) 135/66  SpO2:  [94 %] 94 %                        Physical Exam  Constitutional:       General: She is not in acute distress.  HENT:      Head: Normocephalic and atraumatic.      Right Ear: External ear normal.      Left Ear: External ear normal.      Nose: No congestion or rhinorrhea.      Mouth/Throat:      Mouth: Mucous membranes are moist.      Pharynx: No oropharyngeal exudate or posterior oropharyngeal erythema.   Eyes:      General: No scleral icterus.        Right eye: No discharge.         Left eye: No discharge.      Conjunctiva/sclera: Conjunctivae normal.      Pupils: Pupils are equal, round, and reactive to light.   Cardiovascular:      Rate and Rhythm: Normal rate and regular rhythm.      Heart sounds:      No friction rub. No gallop.   Pulmonary:      Effort: Pulmonary effort is normal.      Comments: Requiring 2 L of oxygen to achieve adequate saturation  Abdominal:      General: Abdomen is flat. There is no distension.      Tenderness: There is no guarding.   Musculoskeletal:         General: No swelling.      Cervical back: Neck supple. No rigidity. No muscular tenderness.      Right lower leg: No edema.      Left lower leg: No edema.   Skin:     General: Skin is dry.      Capillary Refill: Capillary refill takes 2 to 3 seconds.      Coloration:  "Skin is pale. Skin is not jaundiced.      Findings: No bruising or erythema.   Neurological:      Mental Status: She is alert and oriented to person, place, and time.   Psychiatric:         Mood and Affect: Mood normal.         Judgment: Judgment normal.       Laboratory:  Recent Labs     06/04/25  1320   WBC 8.2   RBC 4.47   HEMOGLOBIN 13.8   HEMATOCRIT 41.0   MCV 91.7   MCH 30.9   MCHC 33.7   RDW 43.5   PLATELETCT 163*   MPV 11.5     Recent Labs     06/04/25  1320   SODIUM 137   POTASSIUM 4.6   CHLORIDE 102   CO2 25   GLUCOSE 123*   BUN 25*   CREATININE 0.79   CALCIUM 9.7     Recent Labs     06/04/25  1320   ALTSGPT <5   ASTSGOT 22   ALKPHOSPHAT 51   TBILIRUBIN 0.4   GLUCOSE 123*     Recent Labs     06/04/25  1320   APTT 24.2*   INR 0.98     No results for input(s): \"NTPROBNP\" in the last 72 hours.      No results for input(s): \"TROPONINT\" in the last 72 hours.    Imaging:  [id number] (WITH 1-VIEW CXR) LEFT   Final Result      Normal rib series.      DX-FEMUR-2+ LEFT   Final Result      Left femoral neck fracture with impaction and angulation. There is also a component of fracture extending into the greater and lesser trochanters.      DX-PELVIS-1 OR 2 VIEWS   Final Result      Left femoral neck fracture with impaction and angulation. There is a component seen extending into the lesser and greater trochanters as well.        Assessment/Plan:  Justification for Admission Status  I anticipate this patient will require at least two midnights for appropriate medical management, necessitating inpatient admission because patient has a hip fracture, will require orthopedic surgery     Patient will need a Med/Surg bed on MEDICAL service.      * Left femoral neck fracture- (present on admission)  Assessment & Plan  Imaging show evidence for left femoral neck fracture with impaction and angulation.  Orthopedics [Dr. Sylvester] consulted, plan for operative management  Multimodal pain control, n.p.o. after " midnight  Consider physical and Occupational Therapy, pharmacologic prophylaxis when okay with orthopedics      Preoperative examination- (present on admission)  Assessment & Plan  Medical Assessment Risk:  High    Age above 65 years.  She is at high risk for delirium  Patient has thrombocytopenia and will be at high risk for postoperative bleeding.  Monitor platelet count closely.  The patient will require expedited surgical intervention  The patient has Parkinson's disease  She has no known history of ischemic heart disease, heart failure or chronic respiratory disease    Surgical Risk:   Intermediate    Cardiovascular:   She has no known history of ischemic heart disease or heart failure   I will order a Pre-op EKG    Pulmonary:  Oxygen per protocol  Incentive Spirometer  The patient has Parkinson's disease, consider PEP therapy post surgery if patient remains hypoxic    Renal:   I will order follow-up BUN and creatinine     Neurologic:   Avoid fentanyl (short-acting)  Acetaminophen PO TID PRN  Try to minimize using opioid Pain medications.    If patient develops delirium or agitation consider quetiapine 12.5 mg PO x1 PRN agitation (can repeat x1 in 2 hours PRN agitation).      Hematologic:  Patient has thrombocytopenia and will be at high risk for postoperative bleeding.  Monitor platelet count closely.  Plan on pharmacologic DVT prophylaxis post operative day #1. Hold for decreasing hemoglobin. Notify provider for hemoglobin less than 8.     Thrombocytopenia (HCC)- (present on admission)  Assessment & Plan  No evidence of gross bleeding at this point.  Continue to monitor platelet count.  I will order a follow-up CBC.   Consider holding pharmacologic DVT prophylaxis if platelet counts drops below 50,000 or if there is evidence for bleeding.      LUIS ALFREDO (generalized anxiety disorder)- (present on admission)  Assessment & Plan  Resume home venlafaxine    Parkinson's disease (HCC)- (present on admission)  Assessment  & Plan  I will resume home levodopa-carbidopa     ACP (advance care planning)- (present on admission)  Assessment & Plan  I had a discussion with the patient and family [ and family present at bedside in the emergency room] regarding goals of care, diagnoses, prognosis, and CODE STATUS. We discussed her prognosis and comorbidities.  The patient has advanced age of 80 years.  She has a number of chronic medical problems including Parkinson disease, hypothyroidism and is presenting with acute fracture of the left femoral neck.  However she enjoys relatively good cognitive and functional capacity.  Currently patient/family wants to maintain a full code.  They are open to all forms of invasive or noninvasive diagnostic and therapeutic interventions including CPR and intubation with mechanical ventilation if needed.      Pure hypercholesterolemia- (present on admission)  Assessment & Plan  Cardiac diet    Gastroesophageal reflux disease with esophagitis- (present on admission)  Assessment & Plan  I will start Tums as needed    Acquired hypothyroidism- (present on admission)  Assessment & Plan  I will resume levothyroxine      VTE prophylaxis: SCDs/TEDs    I had a discussion with the patient and family [ and family present at bedside in the emergency room] regarding goals of care, diagnoses, prognosis, and CODE STATUS. We discussed her prognosis and comorbidities.  The patient has advanced age of 80 years.  She has a number of chronic medical problems including Parkinson disease, hypothyroidism and is presenting with acute fracture of the left femoral neck.  However she enjoys relatively good cognitive and functional capacity.  Currently patient/family wants to maintain a full code.  They are open to all forms of invasive or noninvasive diagnostic and therapeutic interventions including CPR and intubation with mechanical ventilation if needed. I spent 16 minutes on advanced care planning          [1]    Allergies  Allergen Reactions    Augmentin [Amoxicillin-Pot Clavulanate] Diarrhea     Severe diarrhea    Iodine Rash     rash    Mercury Rash     .    Other Environmental Runny Nose and Unspecified     Grasses, trees, animal dander, also causes drowsiness

## 2025-06-04 NOTE — ED NOTES
Medication history reviewed with pts . Med rec is complete.  Interviewed pt with  at bedside with permission from pt.    Pts  reports that pt is not taking MYRBETRIQ 25MG or ROPINIROLE 0.5MG in the last 30 days or longer.    Patient has not had any outpatient antibiotics in the last 30 days.    Pt is not on any anticoagulants      Dispense history available in EPIC? YES

## 2025-06-05 ENCOUNTER — APPOINTMENT (OUTPATIENT)
Dept: RADIOLOGY | Facility: MEDICAL CENTER | Age: 80
DRG: 482 | End: 2025-06-05
Attending: ORTHOPAEDIC SURGERY
Payer: MEDICARE

## 2025-06-05 ENCOUNTER — ANESTHESIA (OUTPATIENT)
Dept: SURGERY | Facility: MEDICAL CENTER | Age: 80
DRG: 482 | End: 2025-06-05
Payer: MEDICARE

## 2025-06-05 PROBLEM — Z71.89 ADVANCE CARE PLANNING: Status: ACTIVE | Noted: 2025-06-05

## 2025-06-05 PROBLEM — D72.829 LEUKOCYTOSIS: Status: ACTIVE | Noted: 2025-06-05

## 2025-06-05 LAB
ALBUMIN SERPL BCP-MCNC: 4.1 G/DL (ref 3.2–4.9)
ALBUMIN/GLOB SERPL: 1.8 G/DL
ALP SERPL-CCNC: 48 U/L (ref 30–99)
ALT SERPL-CCNC: 8 U/L (ref 2–50)
ANION GAP SERPL CALC-SCNC: 11 MMOL/L (ref 7–16)
AST SERPL-CCNC: 49 U/L (ref 12–45)
BILIRUB SERPL-MCNC: 0.6 MG/DL (ref 0.1–1.5)
BUN SERPL-MCNC: 24 MG/DL (ref 8–22)
CALCIUM ALBUM COR SERPL-MCNC: 8.5 MG/DL (ref 8.5–10.5)
CALCIUM SERPL-MCNC: 8.6 MG/DL (ref 8.5–10.5)
CHLORIDE SERPL-SCNC: 102 MMOL/L (ref 96–112)
CO2 SERPL-SCNC: 24 MMOL/L (ref 20–33)
CREAT SERPL-MCNC: 0.7 MG/DL (ref 0.5–1.4)
EKG IMPRESSION: NORMAL
ERYTHROCYTE [DISTWIDTH] IN BLOOD BY AUTOMATED COUNT: 44.3 FL (ref 35.9–50)
GFR SERPLBLD CREATININE-BSD FMLA CKD-EPI: 88 ML/MIN/1.73 M 2
GLOBULIN SER CALC-MCNC: 2.3 G/DL (ref 1.9–3.5)
GLUCOSE SERPL-MCNC: 108 MG/DL (ref 65–99)
HCT VFR BLD AUTO: 36.9 % (ref 37–47)
HGB BLD-MCNC: 12.2 G/DL (ref 12–16)
MAGNESIUM SERPL-MCNC: 2.1 MG/DL (ref 1.5–2.5)
MCH RBC QN AUTO: 30.7 PG (ref 27–33)
MCHC RBC AUTO-ENTMCNC: 33.1 G/DL (ref 32.2–35.5)
MCV RBC AUTO: 92.9 FL (ref 81.4–97.8)
PLATELET # BLD AUTO: 155 K/UL (ref 164–446)
PMV BLD AUTO: 11.4 FL (ref 9–12.9)
POTASSIUM SERPL-SCNC: 4 MMOL/L (ref 3.6–5.5)
PROT SERPL-MCNC: 6.4 G/DL (ref 6–8.2)
RBC # BLD AUTO: 3.97 M/UL (ref 4.2–5.4)
SODIUM SERPL-SCNC: 137 MMOL/L (ref 135–145)
WBC # BLD AUTO: 12.5 K/UL (ref 4.8–10.8)

## 2025-06-05 PROCEDURE — 99233 SBSQ HOSP IP/OBS HIGH 50: CPT | Mod: 25 | Performed by: INTERNAL MEDICINE

## 2025-06-05 PROCEDURE — A9270 NON-COVERED ITEM OR SERVICE: HCPCS | Performed by: INTERNAL MEDICINE

## 2025-06-05 PROCEDURE — 700102 HCHG RX REV CODE 250 W/ 637 OVERRIDE(OP): Performed by: INTERNAL MEDICINE

## 2025-06-05 PROCEDURE — 700102 HCHG RX REV CODE 250 W/ 637 OVERRIDE(OP): Performed by: ANESTHESIOLOGY

## 2025-06-05 PROCEDURE — 160048 HCHG OR STATISTICAL LEVEL 1-5: Performed by: ORTHOPAEDIC SURGERY

## 2025-06-05 PROCEDURE — 80053 COMPREHEN METABOLIC PANEL: CPT

## 2025-06-05 PROCEDURE — 83735 ASSAY OF MAGNESIUM: CPT

## 2025-06-05 PROCEDURE — 94760 N-INVAS EAR/PLS OXIMETRY 1: CPT

## 2025-06-05 PROCEDURE — A9270 NON-COVERED ITEM OR SERVICE: HCPCS | Performed by: HOSPITALIST

## 2025-06-05 PROCEDURE — 770001 HCHG ROOM/CARE - MED/SURG/GYN PRIV*

## 2025-06-05 PROCEDURE — 27245 TREAT THIGH FRACTURE: CPT | Mod: LT | Performed by: ORTHOPAEDIC SURGERY

## 2025-06-05 PROCEDURE — 502000 HCHG MISC OR IMPLANTS RC 0278: Performed by: ORTHOPAEDIC SURGERY

## 2025-06-05 PROCEDURE — 99223 1ST HOSP IP/OBS HIGH 75: CPT | Mod: 57 | Performed by: ORTHOPAEDIC SURGERY

## 2025-06-05 PROCEDURE — 51798 US URINE CAPACITY MEASURE: CPT

## 2025-06-05 PROCEDURE — 160035 HCHG PACU - 1ST 60 MINS PHASE I: Performed by: ORTHOPAEDIC SURGERY

## 2025-06-05 PROCEDURE — 160015 HCHG STAT PREOP MINUTES: Performed by: ORTHOPAEDIC SURGERY

## 2025-06-05 PROCEDURE — 160041 HCHG SURGERY MINUTES - EA ADDL 1 MIN LEVEL 4: Performed by: ORTHOPAEDIC SURGERY

## 2025-06-05 PROCEDURE — 700105 HCHG RX REV CODE 258: Performed by: ORTHOPAEDIC SURGERY

## 2025-06-05 PROCEDURE — 160009 HCHG ANES TIME/MIN: Performed by: ORTHOPAEDIC SURGERY

## 2025-06-05 PROCEDURE — 36415 COLL VENOUS BLD VENIPUNCTURE: CPT

## 2025-06-05 PROCEDURE — C1713 ANCHOR/SCREW BN/BN,TIS/BN: HCPCS | Performed by: ORTHOPAEDIC SURGERY

## 2025-06-05 PROCEDURE — 160002 HCHG RECOVERY MINUTES (STAT): Performed by: ORTHOPAEDIC SURGERY

## 2025-06-05 PROCEDURE — 160029 HCHG SURGERY MINUTES - 1ST 30 MINS LEVEL 4: Performed by: ORTHOPAEDIC SURGERY

## 2025-06-05 PROCEDURE — 93005 ELECTROCARDIOGRAM TRACING: CPT | Mod: TC | Performed by: ANESTHESIOLOGY

## 2025-06-05 PROCEDURE — 700102 HCHG RX REV CODE 250 W/ 637 OVERRIDE(OP): Performed by: HOSPITALIST

## 2025-06-05 PROCEDURE — 99497 ADVNCD CARE PLAN 30 MIN: CPT | Performed by: INTERNAL MEDICINE

## 2025-06-05 PROCEDURE — 700111 HCHG RX REV CODE 636 W/ 250 OVERRIDE (IP): Mod: JZ | Performed by: ANESTHESIOLOGY

## 2025-06-05 PROCEDURE — 85027 COMPLETE CBC AUTOMATED: CPT

## 2025-06-05 PROCEDURE — 700111 HCHG RX REV CODE 636 W/ 250 OVERRIDE (IP): Mod: JZ | Performed by: HOSPITALIST

## 2025-06-05 PROCEDURE — 0QS706Z REPOSITION LEFT UPPER FEMUR WITH INTRAMEDULLARY INTERNAL FIXATION DEVICE, OPEN APPROACH: ICD-10-PCS | Performed by: ORTHOPAEDIC SURGERY

## 2025-06-05 PROCEDURE — A9270 NON-COVERED ITEM OR SERVICE: HCPCS | Performed by: ANESTHESIOLOGY

## 2025-06-05 PROCEDURE — 160036 HCHG PACU - EA ADDL 30 MINS PHASE I: Performed by: ORTHOPAEDIC SURGERY

## 2025-06-05 PROCEDURE — 73501 X-RAY EXAM HIP UNI 1 VIEW: CPT | Mod: LT

## 2025-06-05 PROCEDURE — 700111 HCHG RX REV CODE 636 W/ 250 OVERRIDE (IP): Mod: JZ | Performed by: INTERNAL MEDICINE

## 2025-06-05 PROCEDURE — 93010 ELECTROCARDIOGRAM REPORT: CPT | Performed by: INTERNAL MEDICINE

## 2025-06-05 PROCEDURE — 700101 HCHG RX REV CODE 250: Performed by: ANESTHESIOLOGY

## 2025-06-05 PROCEDURE — 700111 HCHG RX REV CODE 636 W/ 250 OVERRIDE (IP): Performed by: ANESTHESIOLOGY

## 2025-06-05 PROCEDURE — 700111 HCHG RX REV CODE 636 W/ 250 OVERRIDE (IP): Performed by: ORTHOPAEDIC SURGERY

## 2025-06-05 DEVICE — PIN PRECISION TM TAPER D3.2/3.9 X 450MM (1EA): Type: IMPLANTABLE DEVICE | Site: HIP | Status: FUNCTIONAL

## 2025-06-05 DEVICE — IMPLANTABLE DEVICE: Type: IMPLANTABLE DEVICE | Site: HIP | Status: FUNCTIONAL

## 2025-06-05 DEVICE — LOCKING SCREW DIA 5X35MM: Type: IMPLANTABLE DEVICE | Site: HIP | Status: FUNCTIONAL

## 2025-06-05 RX ORDER — HYDROMORPHONE HYDROCHLORIDE 1 MG/ML
0.4 INJECTION, SOLUTION INTRAMUSCULAR; INTRAVENOUS; SUBCUTANEOUS
Status: DISCONTINUED | OUTPATIENT
Start: 2025-06-05 | End: 2025-06-05 | Stop reason: HOSPADM

## 2025-06-05 RX ORDER — HYDROMORPHONE HYDROCHLORIDE 1 MG/ML
0.1 INJECTION, SOLUTION INTRAMUSCULAR; INTRAVENOUS; SUBCUTANEOUS
Status: DISCONTINUED | OUTPATIENT
Start: 2025-06-05 | End: 2025-06-05 | Stop reason: HOSPADM

## 2025-06-05 RX ORDER — HYDROMORPHONE HYDROCHLORIDE 1 MG/ML
0.2 INJECTION, SOLUTION INTRAMUSCULAR; INTRAVENOUS; SUBCUTANEOUS
Status: DISCONTINUED | OUTPATIENT
Start: 2025-06-05 | End: 2025-06-05 | Stop reason: HOSPADM

## 2025-06-05 RX ORDER — OXYCODONE HCL 5 MG/5 ML
5 SOLUTION, ORAL ORAL
Status: COMPLETED | OUTPATIENT
Start: 2025-06-05 | End: 2025-06-05

## 2025-06-05 RX ORDER — CARBIDOPA AND LEVODOPA 25; 100 MG/1; MG/1
1 TABLET ORAL ONCE
Status: DISCONTINUED | OUTPATIENT
Start: 2025-06-05 | End: 2025-06-05 | Stop reason: HOSPADM

## 2025-06-05 RX ORDER — ONDANSETRON 2 MG/ML
4 INJECTION INTRAMUSCULAR; INTRAVENOUS
Status: DISCONTINUED | OUTPATIENT
Start: 2025-06-05 | End: 2025-06-05 | Stop reason: HOSPADM

## 2025-06-05 RX ORDER — GLYCOPYRROLATE 0.2 MG/ML
INJECTION INTRAMUSCULAR; INTRAVENOUS PRN
Status: DISCONTINUED | OUTPATIENT
Start: 2025-06-05 | End: 2025-06-05 | Stop reason: SURG

## 2025-06-05 RX ORDER — OMEPRAZOLE 20 MG/1
20 CAPSULE, DELAYED RELEASE ORAL DAILY
Status: DISCONTINUED | OUTPATIENT
Start: 2025-06-05 | End: 2025-06-10 | Stop reason: HOSPADM

## 2025-06-05 RX ORDER — DEXAMETHASONE SODIUM PHOSPHATE 4 MG/ML
INJECTION, SOLUTION INTRA-ARTICULAR; INTRALESIONAL; INTRAMUSCULAR; INTRAVENOUS; SOFT TISSUE PRN
Status: DISCONTINUED | OUTPATIENT
Start: 2025-06-05 | End: 2025-06-05 | Stop reason: SURG

## 2025-06-05 RX ORDER — EPHEDRINE SULFATE 50 MG/ML
INJECTION, SOLUTION INTRAVENOUS PRN
Status: DISCONTINUED | OUTPATIENT
Start: 2025-06-05 | End: 2025-06-05 | Stop reason: SURG

## 2025-06-05 RX ORDER — OXYCODONE HCL 5 MG/5 ML
10 SOLUTION, ORAL ORAL
Status: COMPLETED | OUTPATIENT
Start: 2025-06-05 | End: 2025-06-05

## 2025-06-05 RX ORDER — DIPHENHYDRAMINE HYDROCHLORIDE 50 MG/ML
12.5 INJECTION, SOLUTION INTRAMUSCULAR; INTRAVENOUS
Status: DISCONTINUED | OUTPATIENT
Start: 2025-06-05 | End: 2025-06-05 | Stop reason: HOSPADM

## 2025-06-05 RX ORDER — SODIUM CHLORIDE 9 MG/ML
INJECTION, SOLUTION INTRAVENOUS CONTINUOUS
Status: DISCONTINUED | OUTPATIENT
Start: 2025-06-05 | End: 2025-06-05 | Stop reason: HOSPADM

## 2025-06-05 RX ORDER — CEFAZOLIN SODIUM 1 G/3ML
INJECTION, POWDER, FOR SOLUTION INTRAMUSCULAR; INTRAVENOUS PRN
Status: DISCONTINUED | OUTPATIENT
Start: 2025-06-05 | End: 2025-06-05 | Stop reason: SURG

## 2025-06-05 RX ORDER — ONDANSETRON 2 MG/ML
INJECTION INTRAMUSCULAR; INTRAVENOUS PRN
Status: DISCONTINUED | OUTPATIENT
Start: 2025-06-05 | End: 2025-06-05 | Stop reason: SURG

## 2025-06-05 RX ORDER — ENOXAPARIN SODIUM 100 MG/ML
40 INJECTION SUBCUTANEOUS DAILY
Status: DISCONTINUED | OUTPATIENT
Start: 2025-06-05 | End: 2025-06-10 | Stop reason: HOSPADM

## 2025-06-05 RX ADMIN — CARBIDOPA AND LEVODOPA 1 TABLET: 25; 100 TABLET ORAL at 21:45

## 2025-06-05 RX ADMIN — DEXAMETHASONE SODIUM PHOSPHATE 4 MG: 4 INJECTION INTRA-ARTICULAR; INTRALESIONAL; INTRAMUSCULAR; INTRAVENOUS; SOFT TISSUE at 11:52

## 2025-06-05 RX ADMIN — OXYCODONE 5 MG: 5 TABLET ORAL at 22:16

## 2025-06-05 RX ADMIN — PROPOFOL 110 MG: 10 INJECTION, EMULSION INTRAVENOUS at 11:48

## 2025-06-05 RX ADMIN — CARBIDOPA AND LEVODOPA 1 TABLET: 25; 100 TABLET ORAL at 16:31

## 2025-06-05 RX ADMIN — FENTANYL CITRATE 50 MCG: 50 INJECTION, SOLUTION INTRAMUSCULAR; INTRAVENOUS at 13:15

## 2025-06-05 RX ADMIN — FENTANYL CITRATE 50 MCG: 50 INJECTION, SOLUTION INTRAMUSCULAR; INTRAVENOUS at 11:42

## 2025-06-05 RX ADMIN — ENOXAPARIN SODIUM 40 MG: 100 INJECTION SUBCUTANEOUS at 17:38

## 2025-06-05 RX ADMIN — GLYCOPYRROLATE 0.2 MG: 0.2 INJECTION INTRAMUSCULAR; INTRAVENOUS at 12:04

## 2025-06-05 RX ADMIN — CEFAZOLIN 2 G: 2 INJECTION, POWDER, FOR SOLUTION INTRAMUSCULAR; INTRAVENOUS at 21:52

## 2025-06-05 RX ADMIN — HYDROMORPHONE HYDROCHLORIDE 0.25 MG: 1 INJECTION, SOLUTION INTRAMUSCULAR; INTRAVENOUS; SUBCUTANEOUS at 03:54

## 2025-06-05 RX ADMIN — EPHEDRINE SULFATE 5 MG: 50 INJECTION, SOLUTION INTRAVENOUS at 11:55

## 2025-06-05 RX ADMIN — OXYCODONE HYDROCHLORIDE 10 MG: 5 SOLUTION ORAL at 13:15

## 2025-06-05 RX ADMIN — FENTANYL CITRATE 50 MCG: 50 INJECTION, SOLUTION INTRAMUSCULAR; INTRAVENOUS at 13:30

## 2025-06-05 RX ADMIN — FENTANYL CITRATE 50 MCG: 50 INJECTION, SOLUTION INTRAMUSCULAR; INTRAVENOUS at 11:59

## 2025-06-05 RX ADMIN — EPHEDRINE SULFATE 5 MG: 50 INJECTION, SOLUTION INTRAVENOUS at 12:04

## 2025-06-05 RX ADMIN — CARBIDOPA AND LEVODOPA 1 TABLET: 25; 100 TABLET ORAL at 13:15

## 2025-06-05 RX ADMIN — CEFAZOLIN 2 G: 2 INJECTION, POWDER, FOR SOLUTION INTRAMUSCULAR; INTRAVENOUS at 16:47

## 2025-06-05 RX ADMIN — CEFAZOLIN 2 G: 1 INJECTION, POWDER, FOR SOLUTION INTRAMUSCULAR; INTRAVENOUS at 11:49

## 2025-06-05 RX ADMIN — ONDANSETRON 4 MG: 2 INJECTION INTRAMUSCULAR; INTRAVENOUS at 12:05

## 2025-06-05 ASSESSMENT — PAIN SCALES - PAIN ASSESSMENT IN ADVANCED DEMENTIA (PAINAD)
TOTALSCORE: 6
FACIALEXPRESSION: SMILING OR INEXPRESSIVE
BREATHING: NORMAL
CONSOLABILITY: DISTRACTED OR REASSURED BY VOICE/TOUCH
BODYLANGUAGE: TENSE, DISTRESSED PACING, FIDGETING
TOTALSCORE: 8
CONSOLABILITY: NO NEED TO CONSOLE
FACIALEXPRESSION: SAD, FRIGHTENED, FROWN
CONSOLABILITY: NO NEED TO CONSOLE
FACIALEXPRESSION: SMILING OR INEXPRESSIVE
CONSOLABILITY: UNABLE TO CONSOLE, DISTRACT OR REASSURE
BODYLANGUAGE: RELAXED
CONSOLABILITY: NO NEED TO CONSOLE
BREATHING: NORMAL
CONSOLABILITY: NO NEED TO CONSOLE
BREATHING: NORMAL
TOTALSCORE: 0
TOTALSCORE: 0
NEGVOCALIZATION: REPEATED TROUBLED CALLING OUT, LOUD MOANING/GROANING, CRYING
NEGVOCALIZATION: REPEATED TROUBLED CALLING OUT, LOUD MOANING/GROANING, CRYING
BREATHING: OCCASIONAL LABORED BREATHING, SHORT PERIOD OF HYPERVENTILATION
BREATHING: NORMAL
FACIALEXPRESSION: SMILING OR INEXPRESSIVE
TOTALSCORE: 0
FACIALEXPRESSION: SMILING OR INEXPRESSIVE
FACIALEXPRESSION: FACIAL GRIMACING
BREATHING: NORMAL
TOTALSCORE: 0
BODYLANGUAGE: RELAXED
BODYLANGUAGE: RIGID, FISTS CLENCHED, KNEES UP, PUSHING/PULLING AWAY, STRIKES OUT
BODYLANGUAGE: RELAXED
BODYLANGUAGE: RELAXED

## 2025-06-05 ASSESSMENT — ENCOUNTER SYMPTOMS
NERVOUS/ANXIOUS: 0
VOMITING: 0
DOUBLE VISION: 0
FALLS: 1
HEADACHES: 0
COUGH: 0
BLURRED VISION: 0
DIZZINESS: 0
PALPITATIONS: 0
HEARTBURN: 0
CHILLS: 0
FEVER: 0
MYALGIAS: 1
SHORTNESS OF BREATH: 0
ABDOMINAL PAIN: 0

## 2025-06-05 ASSESSMENT — PAIN DESCRIPTION - PAIN TYPE
TYPE: SURGICAL PAIN
TYPE: ACUTE PAIN
TYPE: SURGICAL PAIN
TYPE: SURGICAL PAIN
TYPE: ACUTE PAIN
TYPE: SURGICAL PAIN
TYPE: SURGICAL PAIN
TYPE: ACUTE PAIN
TYPE: ACUTE PAIN
TYPE: SURGICAL PAIN

## 2025-06-05 ASSESSMENT — PATIENT HEALTH QUESTIONNAIRE - PHQ9
2. FEELING DOWN, DEPRESSED, IRRITABLE, OR HOPELESS: NOT AT ALL
SUM OF ALL RESPONSES TO PHQ9 QUESTIONS 1 AND 2: 0
1. LITTLE INTEREST OR PLEASURE IN DOING THINGS: NOT AT ALL

## 2025-06-05 ASSESSMENT — LIFESTYLE VARIABLES: SUBSTANCE_ABUSE: 0

## 2025-06-05 NOTE — PROGRESS NOTES
4 Eyes Skin Assessment Completed by CLARI Villagomez and CLARI Luevano.    Skin assessment is primarily focused on high risk bony prominences. Pay special attention to skin beneath and around medical devices, high risk bony prominences, skin to skin areas and areas where the patient lacks sensation to feel pain and areas where the patient previously had breakdown.     Head (Occipital):  WDL   Ears (Under Medical Devices): Redness, Boggy, Blanching to right ear   Nose (Under Medical Devices): WDL   Mouth:  WDL   Neck: WDL   Breast/Chest:  WDL   Shoulder Blades:  WDL   Spine:   WDL   (R) Arm/Elbow/Hand: Red and Blanching   (L) Arm/Elbow/Hand: Skin Tear, Scab, Red, and Blanching   Abdomen: WDL   Pannus/Groin:  WDL   Sacrum/Coccyx:   WDL   (R) Ischial Tuberosity (Sit Bones):  WDL   (L) Ischial Tuberosity (Sit Bones):  WDL   (R) Leg:  WDL   (L) Leg:  Scab   (R) Heel:  Pink and Blanching   (R) Foot/Toe: WDL   (L) Heel: Pink and Blanching   (L) Foot/Toe:  WDL       DEVICES IN USE:   Respiratory Devices:  NA, patient on room air  Feeding Devices:  N/A   Lines & BP Monitoring Devices:  Peripheral IV    Orthopedic Devices:  N/A  Miscellaneous Devices:  Purewick and SCDs    PROTOCOL INTERVENTIONS:   Standard/Trauma Bed:  Already in place  Q2 Turns with Pillows:  Applied this assessment  Condom Cath/Purewick:  Already in place    WOUND PHOTOS:   Completed and in EPIC       Left arm      Left hand      Right ear      Left knee    WOUND CONSULT:   Consult ordered for the following areas Right ear, left arm wound

## 2025-06-05 NOTE — DISCHARGE PLANNING
Case Management Discharge Planning    Admission Date: 6/4/2025  GMLOS: 2.8  ALOS: 1    6-Clicks ADL Score: 12  6-Clicks Mobility Score: 9  PT and/or OT Eval ordered: Yes  Post-acute Referrals Ordered: No  Post-acute Choice Obtained: No  Has referral(s) been sent to post-acute provider:  No      Anticipated Discharge Dispo:      DME Needed: No    Action(s) Taken: Case discussed in IDT rounds, Pt may need placement after surgery. Pending PT/OT recommendations.    Escalations Completed: None    Medically Clear: No    Next Steps: Assessment, choice for post acute needs    Barriers to Discharge: Medical clearance    Is the patient up for discharge tomorrow: No

## 2025-06-05 NOTE — OR NURSING
1229: Pt arrived to PACU phase 1 at this time from OR. Pt sedated. RR spontaneous, equal, even, and unlabored with LMA in place. 10L via LMA in place. VSS. Incision dressing clean, intact with red bleed through on one dressing (edges marked). ICE pack placed on incision site. SCD's on and in place.    Bilat. LE pulses 1+, cap. Refill <3, pink, cool.    1310: Report given to PACU CLARI Moss.

## 2025-06-05 NOTE — PROGRESS NOTES
Bedside report received from CLARI Martinez. Patient resting in bed. Call bell within reach, bed alarm on and in place. All belongings within reach for patient. No further needs at this time.

## 2025-06-05 NOTE — ASSESSMENT & PLAN NOTE
6/6: I discussed with the patient's  at bedside for at least 16 minutes further goals of care.  The patient's  was interested in filling out a POLST form.  The patient does not have capacity for decision making at this time.  I filled out a POLST form together with the patient's  at bedside.  For now the patient's  would like the patient to have CPR attempted, the patient has been would like the patient to have full treatment options including intubation, mechanical ventilation and transferred to the ICU as needed.  For now the patient's  would like the patient to have long-term artificial nutrition/feeding tube.  The patient lacks decisional capacity.  The patient's  signed the POLST form.  I have given the POLST form to nursing to upload to the system.

## 2025-06-05 NOTE — CARE PLAN
The patient is Stable - Low risk of patient condition declining or worsening    Shift Goals  Clinical Goals: Patient will manage pain to tolerable level and remain free from falls during shift  Patient Goals: Rest, pain control  Family Goals: Update on POC    Progress made toward(s) clinical / shift goals: Patient managing pain with PRN oxycodone, rest, cold packs, and PRN dilaudid. No falls sustained.    Patient is not progressing towards the following goals: N/A

## 2025-06-05 NOTE — ASSESSMENT & PLAN NOTE
"Came in with cardiogenic shock requiring  5mcg/kg/min and lasix drip for hemodynamic optimization, diuressed 4L to euvolemia and was able to be weaned off of . Throughout the admission she was in AF with RVR (in the low 100s), device interrogation showed very low AF burden prior to this, patient posited that inciting event was cocaine use the week prior (matched with duration of AF). She was cardioverted on 07/02 and discharged on 07/03. With heart failure instructions.    Of note, we had several discussions on the high risk of mortality and poor prognosis based on her cardiogenic shock, ischemic cardiomyopathy, and lifestyle choices/cocaine use. Attempted to address advanced directives, but patient had persistently poor insight into her disease process.       BP 94/65 (BP Location: Left arm, Patient Position: Lying)   Pulse 60   Temp 97.7 °F (36.5 °C) (Axillary)   Resp (!) 40   Ht 5' 1.81" (1.57 m)   Wt 57 kg (125 lb 10.6 oz)   SpO2 100%   Breastfeeding? No   BMI 23.12 kg/m²   Constitutional: No distress, cachectic, conversant  HEENT: Sclera anicteric, PERRLA, EOMI. Temporal wasting present  Neck: No JVD, no masses, good movement  CV: RRR, S1 and S2 normal, no additional heart sounds or murmurs. Pulses 2+ and equal bilaterally in radial arteries.  Pulm: Clear to auscultation bilaterally with symmetrical expansion.  GI: Abdomen soft, non-tender, good bowel sounds  Extremities: Both extremities intact and grossly normal, skin is warm, no edema noted  Skin: No ecchymosis, erythema, or ulcers  Psych: AOx3, appropriate affect  Neuro: CNII-XII intact, no focal deficits      " Cardiac diet

## 2025-06-05 NOTE — ANESTHESIA PROCEDURE NOTES
Airway    Date/Time: 6/5/2025 11:50 AM    Performed by: Arsalan Wagner M.D.  Authorized by: Arsalan Wagner M.D.    Location:  OR  Urgency:  Elective  Indications for Airway Management:  Anesthesia      Spontaneous Ventilation: absent    Sedation Level:  Deep  Preoxygenated: Yes    Mask Difficulty Assessment:  1 - vent by mask  Final Airway Type:  Supraglottic airway  Final Supraglottic Airway:  Standard LMA    SGA Size:  3  Number of Attempts at Approach:  1   Igel LMA

## 2025-06-05 NOTE — ASSESSMENT & PLAN NOTE
I had a discussion with the patient and family [ and family present at bedside in the emergency room] regarding goals of care, diagnoses, prognosis, and CODE STATUS. We discussed her prognosis and comorbidities.  The patient has advanced age of 80 years.  She has a number of chronic medical problems including Parkinson disease, hypothyroidism and is presenting with acute fracture of the left femoral neck.  However she enjoys relatively good cognitive and functional capacity.  Currently patient/family wants to maintain a full code.  They are open to all forms of invasive or noninvasive diagnostic and therapeutic interventions including CPR and intubation with mechanical ventilation if needed.

## 2025-06-05 NOTE — ANESTHESIA PREPROCEDURE EVALUATION
Case: [id number] Date/Time: 06/05/25 1115    Procedure: INSERTION, INTRAMEDULLARY JIHAN, FEMUR, PROXIMAL (Left)    Location:  OR 04 / SURGERY [facility]    Surgeons: Fantasma Sylvester M.D.            Relevant Problems   GI   (positive) Gastroesophageal reflux disease with esophagitis      ENDO   (positive) Acquired hypothyroidism       Physical Exam    Airway   Mallampati: II  TM distance: >3 FB  Neck ROM: full       Cardiovascular - normal exam  Rhythm: regular  Rate: normal    (-) murmur     Dental - normal exam           Pulmonary - normal examBreath sounds clear to auscultation     Abdominal    Neurological - normal exam                   Anesthesia Plan    ASA 2       Plan - general       Airway plan will be LMA    (Acid reflux sx only once per month, food related, no sx now)      Induction: intravenous    Postoperative Plan: Postoperative administration of opioids is intended.    Pertinent diagnostic labs and testing reviewed    Informed Consent:    Anesthetic plan and risks discussed with patient.    Use of blood products discussed with: patient whom consented to blood products.

## 2025-06-05 NOTE — ANESTHESIA TIME REPORT
Anesthesia Start and Stop Event Times       Date Time Event    6/5/2025 1105 Ready for Procedure     1138 Anesthesia Start     1237 Anesthesia Stop          Responsible Staff  06/05/25      Name Role Begin End    Arsalan Wagner M.D. Anesth 1138 1237          Overtime Reason:  no overtime (within assigned shift)    Comments:

## 2025-06-05 NOTE — ASSESSMENT & PLAN NOTE
Medical Assessment Risk:  High    Age above 65 years.  She is at high risk for delirium  Patient has thrombocytopenia and will be at high risk for postoperative bleeding.  Monitor platelet count closely.  The patient will require expedited surgical intervention  The patient has Parkinson's disease  She has no known history of ischemic heart disease, heart failure or chronic respiratory disease    Surgical Risk:   Intermediate    Cardiovascular:   She has no known history of ischemic heart disease or heart failure   I will order a Pre-op EKG    Pulmonary:  Oxygen per protocol  Incentive Spirometer  The patient has Parkinson's disease, consider PEP therapy post surgery if patient remains hypoxic    Renal:   I will order follow-up BUN and creatinine     Neurologic:   Avoid fentanyl (short-acting)  Acetaminophen PO TID PRN  Try to minimize using opioid Pain medications.    If patient develops delirium or agitation consider quetiapine 12.5 mg PO x1 PRN agitation (can repeat x1 in 2 hours PRN agitation).      Hematologic:  Patient has thrombocytopenia and will be at high risk for postoperative bleeding.  Monitor platelet count closely.  Plan on pharmacologic DVT prophylaxis post operative day #1. Hold for decreasing hemoglobin. Notify provider for hemoglobin less than 8.

## 2025-06-05 NOTE — ASSESSMENT & PLAN NOTE
"S/p \"Open reduction internal fixation left intertrochanteric femur fracture with cephalomedullary implant\"    6/9: We appreciate further recommendation by orthopedic surgery.  Pending placement  "

## 2025-06-05 NOTE — OP REPORT
DATE OF OPERATION: 6/5/2025     PREOPERATIVE DIAGNOSIS:  left intertrochanteric femur fracture    POSTOPERATIVE DIAGNOSIS: Same    PROCEDURE PERFORMED:   Open reduction internal fixation left intertrochanteric femur fracture with cephalomedullary implant    SURGEON: Fantasma Sylvester M.D.     ASSISTANT: none    ANESTHESIA: General    SPECIMEN: None    ESTIMATED BLOOD LOSS: 25 mL    IMPLANTS: gustavo 10x125 short CMN, 80mm lag, single interlock    INDICATIONS: The patient is a 80 y.o. female who presented with a left intertrochanteric femur fracture.  I discussed the risks and benefits of the above procedure which include but are not limited to risks of infection, wound healing complication, neurovascular injury, pain, malunion, non-union, malrotation, and the medical risks of anesthesia including MI, stroke, and death.  Alternatives to surgery were also discussed, including non-operative management, which I did not recommend.  The patient and/or their POA was in agreement with the plan to proceed, and the informed consent was signed and documented.    DESCRIPTION OF PROCEDURE:  Patient was seen in the preoperative holding area on the day of surgery and marked on the operative site which was the left hip. They were transported to the operating room.  Anesthesia was induced and the patient was placed into bilateral well-padded fracture boots.  They were then positioned supine on the orthopedic table with a well padded perineal post.  Care was taken to pad any bony prominences and prominent neurovascular structures.  Closed reduction maneuver was then performed using the orthopedic table and checked under fluoroscopy.  The operative extremity was then prescrubbed with a CHG brush followed by an alcohol bath and then prepped and draped in the usual sterile fashion.  A time out was performed in which the correct patient, site, side, procedure, and surgeon's initials on extremity were confirmed by all operating  personnel.     We then turned our attention to the left hip.  A longitudinal incision was made proximal to the tip of the greater trochanter.  A 10 blade was used to incise through the skin, subcutaneous tissue, and fascia and a starting guidepin was placed through the incision and appropriate starting position at the tip of the greater trochanter were confirmed on AP and lateral views and it was advanced into the femur.  The opening reamer was then placed over the guidepin and the proximal femur was opened.  These were then both removed and our implant was selected which was a size 10x125 gustavo short cephalomedullary nail.  It was assembled on the back table and inserted into the proximal femur under fluoroscopic guidance and advanced to final position using a mallet.  The trochar was then inserted into the aiming arm and through a separate lateral incision.  A guidepin was then inserted through the trochar into the head neck segment and appropriate center-center position was confirmed on fluoroscopy.  I then measured for, drilled for, and placed a lag screw into the head neck segment.  Tip apex distance was appropriate on AP and lateral views.  Traction was then let off and the set screw was tightened and backed off one quarter turn to allow controlled collapse.  The aiming arm was then used to place a single distal interlocking bolt through a separate stab incision.  The insertion handle was then removed and final fluoroscopic images were obtained which demonstrated restoration of length, alignment, and rotation as well as safe and appropriate position of our implants.  All wounds were then thoroughly irrigated with saline.  The skin was then closed in layers with 2-0 vicryl followed by staples.  Sterile dressings were then placed.  The patient was then taken off the orthopedic table and taken out of the fracture boots.  The patient's clinical rotation was then assessed and noted to be symmetric to the  contralateral side.  The patient was then awoken from anesthesia without immediate complication and transported to the PACU in stable condition.     POSTOPERATIVE PLAN:      Inpatient plan: PACU to floor. 24 hours of antibiotics.  Mobilize with PT/OT.  Weightbearing status:  WBAT LLE  DVT prophylaxis: SCDs and lovenox until mobilizing well, then aspirin 81mg BID for 4 weeks.  If the patient is on baseline anticoagulation then they may resume their medication 24 hours postoperatively.  Outpatient plan: The patient will follow up in clinic in 2 weeks for wound check, suture/staple removal, and xrays.  If the patient is at a facility at that time, wound check and suture/staple removal may be performed by nursing care and the patient should instead follow up at the 6 week post operative tori for repeat clinical check and xrays.      ____________________________________   Fantasma Sylvester M.D.   DD: 6/5/2025  12:24 PM

## 2025-06-05 NOTE — DIETARY
Nutrition Services: Initial Assessment     Day 1 of admit. Kimmy Diaz is a 80 y.o. female with admitting DX of Fracture [T14.8XXA]     Consult received for MST score >/= 2: unplanned wt loss 2-13 lb x >1 year, poor PO intake.    Current Hospital Problems List:      Left femoral neck fracture (POA: Yes)    Acquired hypothyroidism (Chronic) (POA: Yes)    Gastroesophageal reflux disease with esophagitis (Chronic) (POA: Yes)    Pure hypercholesterolemia (Chronic) (POA: Yes)    Parkinson's disease (HCC) (POA: Yes)    LUIS ALFREDO (generalized anxiety disorder) (POA: Yes)    ACP (advance care planning) (POA: Yes)    Preoperative examination (POA: Yes)    Thrombocytopenia (HCC) (POA: Yes)    Nutrition Assessment:      Height: 152.4 cm (5')  Weight: 49 kg (108 lb 0.4 oz)  Body mass index is 21.1 kg/m²., BMI classification: WNL    Wt Readings from Last 9 Encounters:   06/04/25 49 kg (108 lb 0.4 oz)   04/03/25 48.4 kg (106 lb 11.2 oz)   01/30/25 48.1 kg (106 lb)   01/16/25 48.4 kg (106 lb 11.2 oz)   01/09/25 49.4 kg (108 lb 14.5 oz)   10/10/24 47.8 kg (105 lb 6.1 oz)   09/19/24 47.2 kg (104 lb)   07/09/24 47.6 kg (105 lb)   05/28/24 47.6 kg (105 lb)     Objective:   Pertinent Medical Hx: Parkinson's disease, hypothyroidism, GERD, hemochromatosis, SCC, osteoporosis, PONV. Pt was hiking, tripped and fell; presented w/ hip pain  Currently off the floor to OR for surgical fixation of L hip  Pertinent Labs: glucose 108, BUN 24  Pertinent Meds: Tums, sinemet, synthroid  Skin/Wounds: L dorsal arm scabs, R dorsal ear redness/boggy/blanching  Food Allergies: NKFA  Last BM: none documented this admit    Current Diet Order/Intake:   NPO since midnight. Was on a regular diet yesterday  Intake per ADLs: none documented this admit    Subjective:   Patient reported UBW: SALINAS - pt in OR today  Dietary Recall/Energy Intake: SALINAS - pt in OR today     Nutrition Focused Physical Exam (NFPE)  Weight Change: wt w/ trend up by 3-4 lb this past  year.   NFPE: SALINAS - pt in OR today  Fluid Accumulation: none documented  Reduced  Strength: N/A in acute care setting.    Nutrition Diagnosis:      Based on RD assessment at this time, Patient does not meet criteria in congruence with ASPEN/[employer] guidelines for malnutrition    Nutrition Interventions:      PO diet order to be resumed per MD as appropriate following surgery.  Nutrition representative to see pt for meal orders/preferences during admit  Patient aware of active plan of care as appropriate.     Nutrition Monitoring and Evaluation:      Monitor nutrition POC.  Goal for >50-75% intake from meals and supplements.  Monitor weight trends, PO intake.    RD monitoring informally for PO diet to be resumed and adequate oral intake; will monitor per policy. Available PRN.      Madie PARTIDA Monday, R.D.                                         ASPEN/AND CRITERIA FOR MALNUTRITION

## 2025-06-05 NOTE — OR NURSING
"1310 Assumed pt care from  CLARI Shay    1315 Pt tolerating sips with meds.     1330 Called  [facility] for updates. Left message on VM.     1341 Called [facility] for updates. Tried different number. Left message on VM.     1357 Pt meets criteria for hospital room transfer. Called [facility]. CLARI Shafer phone has been in \"busy\" status. Transferred to Caleb, charge RN.     1400 Report to CLARI Ellis. Emptied elmore = 725mL dark urine.     1404 Transport arranged.     1415 Pt transported to room by RNs.             "

## 2025-06-05 NOTE — PROGRESS NOTES
"Hospital Medicine Daily Progress Note    Date of Service  6/5/2025    Chief Complaint  Kimmy Diaz is a 80 y.o. female admitted 6/4/2025 with fall    Hospital Course  Per chart review:  \"80 y.o. female with a past medical history of Parkinson's disease, and hypothyroidism who presented 6/4/2025 with hip pain after a fall.  Pain is severe rated 10/10.  The pain is worse with movement.  Patient tripped and fell as she was hiking on a trail.  She denies hitting her head or losing consciousness.\"     Interval Problem Update  6/5: Patient seen at bedside this morning.  Patient lying in bed, not complaining of overt pain with not moving.  Patient n.p.o. as she will undergo surgery today by orthopedic surgery.  We appreciate further recommendations.  Patient's son at bedside at the time of my evaluation today.    I have discussed this patient's plan of care and discharge plan at IDT rounds today with Case Management, Nursing, Nursing leadership, and other members of the IDT team.    Consultants/Specialty  orthopedics    Code Status  Full Code    Disposition  The patient is not medically cleared for discharge to home or a post-acute facility.        Review of Systems  Review of Systems   Constitutional:  Positive for malaise/fatigue. Negative for chills and fever.   HENT:  Negative for hearing loss and nosebleeds.    Eyes:  Negative for blurred vision and double vision.   Respiratory:  Negative for cough and shortness of breath.    Cardiovascular:  Negative for chest pain and palpitations.   Gastrointestinal:  Negative for abdominal pain, heartburn and vomiting.   Genitourinary:  Negative for dysuria and urgency.   Musculoskeletal:  Positive for falls, joint pain and myalgias.   Skin:  Negative for itching and rash.   Neurological:  Negative for dizziness and headaches.   Psychiatric/Behavioral:  Negative for substance abuse. The patient is not nervous/anxious.    All other systems reviewed and are negative.   "     Physical Exam  Temp:  [36.4 °C (97.6 °F)-37.7 °C (99.8 °F)] 36.7 °C (98.1 °F)  Pulse:  [59-97] 85  Resp:  [12-18] 16  BP: (132-159)/(56-88) 159/60  SpO2:  [92 %-100 %] 100 %    Physical Exam  Vitals and nursing note reviewed.   Constitutional:       General: She is not in acute distress.     Appearance: She is ill-appearing.   HENT:      Head: Normocephalic and atraumatic.      Right Ear: External ear normal.      Left Ear: External ear normal.      Mouth/Throat:      Pharynx: No oropharyngeal exudate or posterior oropharyngeal erythema.   Eyes:      General:         Right eye: No discharge.         Left eye: No discharge.   Cardiovascular:      Rate and Rhythm: Normal rate and regular rhythm.      Pulses: Normal pulses.      Heart sounds: No murmur heard.     No gallop.   Pulmonary:      Effort: No respiratory distress.   Abdominal:      General: There is no distension.      Palpations: Abdomen is soft.      Tenderness: There is no abdominal tenderness.   Musculoskeletal:         General: Tenderness present.      Cervical back: Normal range of motion and neck supple. No tenderness.   Skin:     General: Skin is warm and dry.   Neurological:      Mental Status: She is alert and oriented to person, place, and time.   Psychiatric:         Mood and Affect: Mood normal.         Behavior: Behavior normal.         Fluids  No intake or output data in the 24 hours ending 06/05/25 1250     Laboratory  Recent Labs     06/04/25  1320 06/05/25  0806   WBC 8.2 12.5*   RBC 4.47 3.97*   HEMOGLOBIN 13.8 12.2   HEMATOCRIT 41.0 36.9*   MCV 91.7 92.9   MCH 30.9 30.7   MCHC 33.7 33.1   RDW 43.5 44.3   PLATELETCT 163* 155*   MPV 11.5 11.4     Recent Labs     06/04/25  1320 06/05/25  0806   SODIUM 137 137   POTASSIUM 4.6 4.0   CHLORIDE 102 102   CO2 25 24   GLUCOSE 123* 108*   BUN 25* 24*   CREATININE 0.79 0.70   CALCIUM 9.7 8.6     Recent Labs     06/04/25  1320   APTT 24.2*   INR 0.98               Imaging  [id number] (WITH  1-VIEW CXR) LEFT   Final Result      Normal rib series.      DX-FEMUR-2+ LEFT   Final Result      Left femoral neck fracture with impaction and angulation. There is also a component of fracture extending into the greater and lesser trochanters.      DX-PELVIS-1 OR 2 VIEWS   Final Result      Left femoral neck fracture with impaction and angulation. There is a component seen extending into the lesser and greater trochanters as well.      DX-HIP-UNILATERAL-WITH PELVIS-1 VIEW LEFT    (Results Pending)   DX-PORTABLE FLUOROSCOPY < 1 HOUR Reason For Exam: Intra op/ non reportable    (Results Pending)        Assessment/Plan  * Left femoral neck fracture- (present on admission)  Assessment & Plan  Patient to undergo surgery today  We appreciate further recommendations  PT/OT after surgery    Leukocytosis  Assessment & Plan  Most likely reactive  monitor    Thrombocytopenia (HCC)- (present on admission)  Assessment & Plan  Mild.  Seems to be chronic.  Monitor.    LUIS ALFREDO (generalized anxiety disorder)- (present on admission)  Assessment & Plan  continue home venlafaxine    Parkinson's disease (HCC)- (present on admission)  Assessment & Plan  continue home levodopa-carbidopa     Gastroesophageal reflux disease with esophagitis- (present on admission)  Assessment & Plan  omeprazole    Acquired hypothyroidism- (present on admission)  Assessment & Plan  continue levothyroxine    Advance care planning  Assessment & Plan  6/5: I discussed with patient and patient's son at bedside for at least 16 minutes further goals of care.  This included CODE STATUS which includes full code and DNR/DNI.  The patient and the patient's son would like the patient to be full code.  We also discussed further treatment goals.  For now the patient would like to have full treatment options available.  This includes invasive or noninvasive procedures as needed.  As per the son, if the patient is unable to make decisions the patient's  will be making  decisions for her. Patient's  # [phone], he was not available at bedside at the time of my evaluation today.         VTE prophylaxis: SCDs, pending surgery, then lovenox    I have performed a physical exam and reviewed and updated ROS and Plan today (6/5/2025). In review of yesterday's note (6/4/2025), there are no changes except as documented above.      I spend at least 51 minutes providing care for this patient.  This included face-to-face interview, physical examination.  Review of lab work including CBC, CMP, magnesium.  Discussing with multidisciplinary team including case management, nursing staff and pharmacy.  Creating plan of care, reviewing orders.    Moreover, I discussed with patient and patient's son at bedside for at least 16 minutes further goals of care.  This included CODE STATUS which includes full code and DNR/DNI.  The patient and the patient's son would like the patient to be full code.  We also discussed further treatment goals.  For now the patient would like to have full treatment options available.  This includes invasive or noninvasive procedures as needed.  As per the son, if the patient is unable to make decisions the patient's  will be making decisions for her.  I tried calling the patient's  at [phone] however I did not get a response and he was not available at bedside at the time of my evaluation today.

## 2025-06-05 NOTE — ANESTHESIA POSTPROCEDURE EVALUATION
Patient: Kimmy Diaz    Procedure Summary       Date: 06/05/25 Room / Location:  OR 04 / SURGERY [facility]    Anesthesia Start: 1138 Anesthesia Stop: 1237    Procedure: INSERTION, INTRAMEDULLARY JIHAN, FEMUR, PROXIMAL (Left: Hip) Diagnosis: (intertrochanter femur fracture)    Surgeons: Fantasma Sylvester M.D. Responsible Provider: Arsalan Wagner M.D.    Anesthesia Type: general ASA Status: 2            Final Anesthesia Type: general  Last vitals  BP   Blood Pressure : 132/64    Temp   37.7 °C (99.8 °F)    Pulse   84   Resp   14    SpO2   92 %      Anesthesia Post Evaluation    Patient location during evaluation: PACU  Patient participation: waiting for patient participation    Airway patency: patent  Anesthetic complications: no  Cardiovascular status: hemodynamically stable  Respiratory status: acceptable  Hydration status: euvolemic    PONV: none          There were no known notable events for this encounter.     Nurse Pain Score: 0 (NPRS)

## 2025-06-05 NOTE — OR NURSING
Patient arrived in Pre-op. Confirmed name, , and surgery to be performed. Verified allergies. Patient is A&OX4. IV patency confirmed.

## 2025-06-05 NOTE — CONSULTS
6/5/2025      HPI: Kimmy Diaz is a 80 y.o. female with a hx of hyperlipidemia, osteoporosis, and Parkinson's who presents with a closed left comminuted intertrochanteric femur fracture after a mechanical fall.  Complains of isolated left hip pain.  Walks with a cane at baseline.    Past Medical History[1]    Past Surgical History[2]    Medications  Medications Ordered Prior to Encounter[3]    Allergies  Augmentin [amoxicillin-pot clavulanate], Iodine, Mercury, and Other environmental    ROS  Negative except as indicated in the HPI    Family History   Problem Relation Age of Onset    Diabetes Other     Lung Disease Mother        Social History[4]    Physical Exam  Vitals  BP (!) 140/78   Pulse 93   Temp 37.2 °C (98.9 °F) (Temporal)   Resp 18   Ht 1.524 m (5')   Wt 49 kg (108 lb 0.4 oz)   SpO2 92%   General: NAD  HEENT: Normocephalic, atraumatic  Psych: Normal mood and affect  Neck: No collar in place, normal appearing motion  Lungs: No increased work of breathing  Heart: Regular rate by palpation of peripheral pulse  Abdomen: Nondistended  MSK:   On inspection of the left lower extremity it is short and externally rotated.  Pain with any motion at the hip including log roll. Patient unable to straight leg raise. Sensation intact to light touch all distributions distally.  Moves ankle and toes up/down.  Foot warm and well perfused with a 2+ DP pulse.      Radiographs:  AP pelvis and xrays of the left femur demonstrate a comminuted intertrochanteric femur fracture    [id number] (WITH 1-VIEW CXR) LEFT   Final Result      Normal rib series.      DX-FEMUR-2+ LEFT   Final Result      Left femoral neck fracture with impaction and angulation. There is also a component of fracture extending into the greater and lesser trochanters.      DX-PELVIS-1 OR 2 VIEWS   Final Result      Left femoral neck fracture with impaction and angulation. There is a component seen extending into the lesser and greater  trochanters as well.      DX-HIP-UNILATERAL-WITH PELVIS-1 VIEW LEFT    (Results Pending)   DX-PORTABLE FLUOROSCOPY < 1 HOUR Reason For Exam: Intra op/ non reportable    (Results Pending)       Laboratory Values  Recent Labs     06/04/25  1320 06/05/25  0806   WBC 8.2 12.5*   RBC 4.47 3.97*   HEMOGLOBIN 13.8 12.2   HEMATOCRIT 41.0 36.9*   MCV 91.7 92.9   MCH 30.9 30.7   MCHC 33.7 33.1   RDW 43.5 44.3   PLATELETCT 163* 155*   MPV 11.5 11.4     Recent Labs     06/04/25  1320 06/05/25  0806   SODIUM 137 137   POTASSIUM 4.6 4.0   CHLORIDE 102 102   CO2 25 24   GLUCOSE 123* 108*   BUN 25* 24*     Recent Labs     06/04/25  1320   APTT 24.2*   INR 0.98         Assessment: 80 y.o. female with a closed left comminuted intertrochanteric femur fracture after a mechanical fall.      Plan: We discussed the diagnosis and findings with the patient at length.  We reviewed possible non operative and operative interventions and the risks and benefits of each of these.  The patient and/or their POA had a chance to ask questions and all of these were answered to their satisfaction. The patient and/or their POA chose to proceed with  operative intervention to include surgical fixation of the left hip. Risks and benefits of surgery were discussed which include but are not limited to bleeding, infection, neurovascular damage, malunion, nonunion, instability, limb length discrepancy, DVT, PE, MI, Stroke and death. They understand these risks and wish to proceed.      N.p.o. for OR  This injury requires urgent surgical intervention to prevent significant morbidity/mortality      Fantasma Sylvester MD  Orthopedic Trauma         [1]   Past Medical History:  Diagnosis Date    Acquired hypothyroidism 04/14/2023    medicated    Actinic keratoses 01/24/2019    Allergic rhinitis     Anesthesia 04/14/2023    PONV with tubal ligation    Anxiety     Cancer (HCC)     Skin    CATARACT 04/14/2023    Bilateral IOL    Gastroesophageal reflux disease with  esophagitis 01/24/2019    Hemochromatosis     carrier    History of squamous cell carcinoma 01/24/2019    Hyperlipidemia     Osteoporosis     Other specified symptom associated with female genital organs     prolapse    Parkinson's disease (tremor, stiffness, slow motion, unstable posture) (HCC)     Pneumonia 04/14/2023    history of    PONV (postoperative nausea and vomiting) 04/14/2023    with tubal ligation    Psychiatric problem 04/14/2023    anxiety, medicated    Pure hypercholesterolemia 05/23/2019    Urinary bladder disorder     prolapse    Vaginal prolapse 01/24/2019    Vitamin D deficiency    [2]   Past Surgical History:  Procedure Laterality Date    LUMBAR DECOMPRESSION  5/1/2023    Procedure: POSTERIOR LEFT L4-5 LUMBAR INTERNAL DECOMPRESSION AND L5-S1 TRANSPEDICULAR DECOMPRESSION;  Surgeon: Nayan Reyes M.D.;  Location: SURGERY [facility];  Service: Neurosurgery    OTHER ORTHOPEDIC SURGERY Right 04/14/2023    shoulder repair    PB SHLDR ARTHROSCOP,SURG,W/ROTAT CUFF REPB Right 11/04/2020    Procedure: ARTHROSCOPY, SHOULDER, WITH ROTATOR CUFF REPAIR;  Surgeon: Svetlana Mcleod M.D.;  Location: SURGERY [facility];  Service: Orthopedics    PB ARTHROSCOPY SHOULDER SURGICAL BICEPS TENODES* Right 11/04/2020    Procedure: ARTHROSCOPY, SHOULDER, WITH BICEPS TENOTOMY;  Surgeon: Svetlana Mcleod M.D.;  Location: SURGERY [facility];  Service: Orthopedics    OTHER  2018    Mohs scalp    CATARACT EXTRACTION WITH IOL Bilateral 2016    SHOULDER ARTHROSCOPY W/ ROTATOR CUFF REPAIR  07/11/2012    Performed by SVETLANA MCLEOD at SURGERY [facility] ORS    BICEPS TENDON REPAIR  07/11/2012    Performed by SVETLANA MCLEOD at SURGERY [facility] ORS    SHOULDER DECOMPRESSION  07/11/2012    Performed by SVETLANA MCLEOD at SURGERY [facility] ORS    VAGINAL HYSTERECTOMY SCOPE TOTAL  08/24/2011    Performed by QAMAR COPELAND at SURGERY SAME DAY [facility] ORS    ANTERIOR AND POSTERIOR REPAIR  08/24/2011    Performed by  QAMAR COPELAND at SURGERY SAME DAY [facility] ORS    TUBAL LIGATION  1980    OTHER      wisdom  teeth   [3]   No current facility-administered medications on file prior to encounter.     Current Outpatient Medications on File Prior to Encounter   Medication Sig Dispense Refill    acetaminophen (TYLENOL) 500 MG Tab Take 500 mg by mouth every 6 hours as needed. Indications: Pain      venlafaxine (EFFEXOR-XR) 150 MG extended-release capsule Take 1 capsule by mouth once daily 90 Capsule 0    carbidopa-levodopa (SINEMET)  MG Tab Take 1 Tablet by mouth 3 times a day for 360 days. Start according to clinic instructions. For Parkinson's (Patient taking differently: Take 1 Tablet by mouth 3 times a day. Start according to clinic instructions. For Parkinson's  Per  reports no set times to take this medication) 270 Tablet 3    SYNTHROID 50 MCG Tab Take 1 Tablet by mouth every day. 90 Tablet 3    VITAMIN D PO Take 1 Capsule by mouth every evening.      MYRBETRIQ 25 MG TABLET SR 24 HR Take 1 Tablet by mouth every day. (Patient not taking: Reported on 6/4/2025) 90 Tablet 3    denosumab (PROLIA) 60 MG/ML Solution Prefilled Syringe injection Inject 1 mL under the skin every 6 months. (Patient taking differently: Inject 60 mg under the skin every 6 months. 2/2025) 1 mL 0   [4]   Social History  Socioeconomic History    Marital status:    Tobacco Use    Smoking status: Never    Smokeless tobacco: Never   Vaping Use    Vaping status: Never Used   Substance and Sexual Activity    Alcohol use: Yes     Comment: occasionally    Drug use: No    Sexual activity: Yes     Social Drivers of Health     Food Insecurity: Patient Declined (6/4/2025)    Hunger Vital Sign     Worried About Running Out of Food in the Last Year: Patient declined     Ran Out of Food in the Last Year: Patient declined   Transportation Needs: Patient Declined (6/4/2025)    PRAPARE - Transportation     Lack of Transportation (Medical): Patient declined      Lack of Transportation (Non-Medical): Patient declined   Intimate Partner Violence: Not At Risk (6/4/2025)    Humiliation, Afraid, Rape, and Kick questionnaire     Fear of Current or Ex-Partner: No     Emotionally Abused: No     Physically Abused: No     Sexually Abused: No   Housing Stability: Patient Declined (6/4/2025)    Housing Stability Vital Sign     Unable to Pay for Housing in the Last Year: Patient declined     Number of Times Moved in the Last Year: 1     Homeless in the Last Year: Patient declined

## 2025-06-06 LAB
ANION GAP SERPL CALC-SCNC: 11 MMOL/L (ref 7–16)
BUN SERPL-MCNC: 17 MG/DL (ref 8–22)
CALCIUM SERPL-MCNC: 8.1 MG/DL (ref 8.5–10.5)
CHLORIDE SERPL-SCNC: 100 MMOL/L (ref 96–112)
CO2 SERPL-SCNC: 26 MMOL/L (ref 20–33)
CREAT SERPL-MCNC: 0.69 MG/DL (ref 0.5–1.4)
ERYTHROCYTE [DISTWIDTH] IN BLOOD BY AUTOMATED COUNT: 45.8 FL (ref 35.9–50)
GFR SERPLBLD CREATININE-BSD FMLA CKD-EPI: 88 ML/MIN/1.73 M 2
GLUCOSE SERPL-MCNC: 114 MG/DL (ref 65–99)
HCT VFR BLD AUTO: 33.9 % (ref 37–47)
HGB BLD-MCNC: 10.9 G/DL (ref 12–16)
MCH RBC QN AUTO: 30.4 PG (ref 27–33)
MCHC RBC AUTO-ENTMCNC: 32.2 G/DL (ref 32.2–35.5)
MCV RBC AUTO: 94.4 FL (ref 81.4–97.8)
PLATELET # BLD AUTO: 150 K/UL (ref 164–446)
PMV BLD AUTO: 11.7 FL (ref 9–12.9)
POTASSIUM SERPL-SCNC: 4.2 MMOL/L (ref 3.6–5.5)
RBC # BLD AUTO: 3.59 M/UL (ref 4.2–5.4)
SODIUM SERPL-SCNC: 137 MMOL/L (ref 135–145)
WBC # BLD AUTO: 10.7 K/UL (ref 4.8–10.8)

## 2025-06-06 PROCEDURE — 99497 ADVNCD CARE PLAN 30 MIN: CPT | Performed by: INTERNAL MEDICINE

## 2025-06-06 PROCEDURE — 51798 US URINE CAPACITY MEASURE: CPT

## 2025-06-06 PROCEDURE — 700111 HCHG RX REV CODE 636 W/ 250 OVERRIDE (IP): Performed by: ORTHOPAEDIC SURGERY

## 2025-06-06 PROCEDURE — 80048 BASIC METABOLIC PNL TOTAL CA: CPT

## 2025-06-06 PROCEDURE — 700102 HCHG RX REV CODE 250 W/ 637 OVERRIDE(OP): Performed by: INTERNAL MEDICINE

## 2025-06-06 PROCEDURE — 97163 PT EVAL HIGH COMPLEX 45 MIN: CPT

## 2025-06-06 PROCEDURE — 97167 OT EVAL HIGH COMPLEX 60 MIN: CPT

## 2025-06-06 PROCEDURE — 97602 WOUND(S) CARE NON-SELECTIVE: CPT

## 2025-06-06 PROCEDURE — A9270 NON-COVERED ITEM OR SERVICE: HCPCS | Performed by: HOSPITALIST

## 2025-06-06 PROCEDURE — 700105 HCHG RX REV CODE 258: Performed by: ORTHOPAEDIC SURGERY

## 2025-06-06 PROCEDURE — 94760 N-INVAS EAR/PLS OXIMETRY 1: CPT

## 2025-06-06 PROCEDURE — 307059 PAD,EAR PROTECTOR: Performed by: INTERNAL MEDICINE

## 2025-06-06 PROCEDURE — 85027 COMPLETE CBC AUTOMATED: CPT

## 2025-06-06 PROCEDURE — 700102 HCHG RX REV CODE 250 W/ 637 OVERRIDE(OP): Performed by: HOSPITALIST

## 2025-06-06 PROCEDURE — 700111 HCHG RX REV CODE 636 W/ 250 OVERRIDE (IP): Mod: JZ | Performed by: INTERNAL MEDICINE

## 2025-06-06 PROCEDURE — 99233 SBSQ HOSP IP/OBS HIGH 50: CPT | Mod: 25 | Performed by: INTERNAL MEDICINE

## 2025-06-06 PROCEDURE — A9270 NON-COVERED ITEM OR SERVICE: HCPCS | Performed by: INTERNAL MEDICINE

## 2025-06-06 PROCEDURE — 36415 COLL VENOUS BLD VENIPUNCTURE: CPT

## 2025-06-06 PROCEDURE — 770001 HCHG ROOM/CARE - MED/SURG/GYN PRIV*

## 2025-06-06 RX ADMIN — OXYCODONE 5 MG: 5 TABLET ORAL at 08:34

## 2025-06-06 RX ADMIN — SENNOSIDES AND DOCUSATE SODIUM 2 TABLET: 50; 8.6 TABLET ORAL at 16:38

## 2025-06-06 RX ADMIN — CARBIDOPA AND LEVODOPA 1 TABLET: 25; 100 TABLET ORAL at 16:38

## 2025-06-06 RX ADMIN — OMEPRAZOLE 20 MG: 20 CAPSULE, DELAYED RELEASE ORAL at 05:36

## 2025-06-06 RX ADMIN — VENLAFAXINE HYDROCHLORIDE 150 MG: 75 CAPSULE, EXTENDED RELEASE ORAL at 05:36

## 2025-06-06 RX ADMIN — LEVOTHYROXINE SODIUM 50 MCG: 0.05 TABLET ORAL at 05:36

## 2025-06-06 RX ADMIN — ENOXAPARIN SODIUM 40 MG: 100 INJECTION SUBCUTANEOUS at 16:38

## 2025-06-06 RX ADMIN — CARBIDOPA AND LEVODOPA 1 TABLET: 25; 100 TABLET ORAL at 08:35

## 2025-06-06 RX ADMIN — CEFAZOLIN 2 G: 2 INJECTION, POWDER, FOR SOLUTION INTRAMUSCULAR; INTRAVENOUS at 05:34

## 2025-06-06 RX ADMIN — CARBIDOPA AND LEVODOPA 1 TABLET: 25; 100 TABLET ORAL at 20:13

## 2025-06-06 ASSESSMENT — PAIN DESCRIPTION - PAIN TYPE
TYPE: SURGICAL PAIN

## 2025-06-06 ASSESSMENT — PAIN SCALES - PAIN ASSESSMENT IN ADVANCED DEMENTIA (PAINAD)
BREATHING: NORMAL
NEGVOCALIZATION: OCCASIONAL MOAN/GROAN, LOW SPEECH, NEGATIVE/DISAPPROVING QUALITY
BODYLANGUAGE: RELAXED
FACIALEXPRESSION: SMILING OR INEXPRESSIVE
BREATHING: NORMAL
BREATHING: NORMAL
TOTALSCORE: 8
BODYLANGUAGE: RELAXED
FACIALEXPRESSION: SMILING OR INEXPRESSIVE
BREATHING: NOISY LABORED BREATHING, LONG PERIODS OF HYPERVENTILATION, CHEYNE-STOKES RESPIRATIONS
FACIALEXPRESSION: SMILING OR INEXPRESSIVE
CONSOLABILITY: DISTRACTED OR REASSURED BY VOICE/TOUCH
BODYLANGUAGE: RIGID, FISTS CLENCHED, KNEES UP, PUSHING/PULLING AWAY, STRIKES OUT
TOTALSCORE: 0
FACIALEXPRESSION: FACIAL GRIMACING
CONSOLABILITY: NO NEED TO CONSOLE
BODYLANGUAGE: RELAXED
CONSOLABILITY: NO NEED TO CONSOLE
CONSOLABILITY: NO NEED TO CONSOLE

## 2025-06-06 ASSESSMENT — COGNITIVE AND FUNCTIONAL STATUS - GENERAL
HELP NEEDED FOR BATHING: A LOT
MOBILITY SCORE: 11
MOVING FROM LYING ON BACK TO SITTING ON SIDE OF FLAT BED: A LOT
DAILY ACTIVITIY SCORE: 12
CLIMB 3 TO 5 STEPS WITH RAILING: TOTAL
DRESSING REGULAR UPPER BODY CLOTHING: A LOT
WALKING IN HOSPITAL ROOM: TOTAL
SUGGESTED CMS G CODE MODIFIER MOBILITY: CL
SUGGESTED CMS G CODE MODIFIER DAILY ACTIVITY: CL
STANDING UP FROM CHAIR USING ARMS: A LOT
PERSONAL GROOMING: A LITTLE
TURNING FROM BACK TO SIDE WHILE IN FLAT BAD: A LITTLE
DRESSING REGULAR LOWER BODY CLOTHING: TOTAL
EATING MEALS: A LITTLE
TOILETING: TOTAL
MOVING TO AND FROM BED TO CHAIR: A LOT

## 2025-06-06 ASSESSMENT — GAIT ASSESSMENTS: GAIT LEVEL OF ASSIST: UNABLE TO PARTICIPATE

## 2025-06-06 ASSESSMENT — PATIENT HEALTH QUESTIONNAIRE - PHQ9
SUM OF ALL RESPONSES TO PHQ9 QUESTIONS 1 AND 2: 0
1. LITTLE INTEREST OR PLEASURE IN DOING THINGS: NOT AT ALL
2. FEELING DOWN, DEPRESSED, IRRITABLE, OR HOPELESS: NOT AT ALL

## 2025-06-06 ASSESSMENT — ACTIVITIES OF DAILY LIVING (ADL): TOILETING: INDEPENDENT

## 2025-06-06 NOTE — PROGRESS NOTES
"Hospital Medicine Daily Progress Note    Date of Service  6/5/2025    Chief Complaint  Kimmy Diaz is a 80 y.o. female admitted 6/4/2025 with fall    Hospital Course  Per chart review:  \"80 y.o. female with a past medical history of Parkinson's disease, and hypothyroidism who presented 6/4/2025 with hip pain after a fall.  Pain is severe rated 10/10.  The pain is worse with movement.  Patient tripped and fell as she was hiking on a trail.  She denies hitting her head or losing consciousness.\"     Interval Problem Update  6/5: Patient seen at bedside this morning.  Patient lying in bed, not complaining of overt pain with not moving.  Patient n.p.o. as she will undergo surgery today by orthopedic surgery.  We appreciate further recommendations.  Patient's son at bedside at the time of my evaluation today.    6/6: Patient seen at bedside this morning.  Patient lying in bed, currently not complaining of overt pain.  She seems to be more confused than yesterday.  As per the patient's  the patient's son patient with some cognitive decline in the last couple days.  This could be due to her Parkinson's.  May be some underlying dementia.  Will monitor closely.  On examination there is some blood on the dressings from surgery from yesterday however she seems to be hemodynamically stable.  Will continue with Lovenox for DVT prophylaxis for now and monitor closely.  We appreciate further recommendations by orthopedic surgery.    I have discussed this patient's plan of care and discharge plan at IDT rounds today with Case Management, Nursing, Nursing leadership, and other members of the IDT team.    Consultants/Specialty  orthopedics    Code Status  Full Code    Disposition  The patient is not medically cleared for discharge to home or a post-acute facility.        Review of Systems  Review of Systems   Unable to perform ROS: Mental acuity        Physical Exam  Temp:  [36.4 °C (97.6 °F)-37.7 °C (99.8 °F)] 36.7 " °C (98.1 °F)  Pulse:  [59-97] 85  Resp:  [12-18] 16  BP: (132-159)/(56-88) 159/60  SpO2:  [92 %-100 %] 100 %    Physical Exam  Vitals and nursing note reviewed.   Constitutional:       General: She is not in acute distress.     Appearance: She is ill-appearing.   HENT:      Head: Normocephalic and atraumatic.      Right Ear: External ear normal.      Left Ear: External ear normal.      Mouth/Throat:      Pharynx: No oropharyngeal exudate or posterior oropharyngeal erythema.   Eyes:      General:         Right eye: No discharge.         Left eye: No discharge.   Cardiovascular:      Rate and Rhythm: Normal rate and regular rhythm.      Pulses: Normal pulses.      Heart sounds: No murmur heard.     No gallop.   Pulmonary:      Effort: No respiratory distress.   Abdominal:      General: There is no distension.      Palpations: Abdomen is soft.      Tenderness: There is no abdominal tenderness.   Musculoskeletal:         General: Tenderness present.      Cervical back: Normal range of motion and neck supple. No tenderness.      Comments: Dressing in place   Skin:     General: Skin is warm and dry.   Neurological:      Mental Status: She is alert and oriented to person, place, and time.   Psychiatric:         Mood and Affect: Mood normal.         Behavior: Behavior normal.         Fluids  No intake or output data in the 24 hours ending 06/05/25 1250     Laboratory  Recent Labs     06/04/25  1320 06/05/25  0806   WBC 8.2 12.5*   RBC 4.47 3.97*   HEMOGLOBIN 13.8 12.2   HEMATOCRIT 41.0 36.9*   MCV 91.7 92.9   MCH 30.9 30.7   MCHC 33.7 33.1   RDW 43.5 44.3   PLATELETCT 163* 155*   MPV 11.5 11.4     Recent Labs     06/04/25  1320 06/05/25  0806   SODIUM 137 137   POTASSIUM 4.6 4.0   CHLORIDE 102 102   CO2 25 24   GLUCOSE 123* 108*   BUN 25* 24*   CREATININE 0.79 0.70   CALCIUM 9.7 8.6     Recent Labs     06/04/25  1320   APTT 24.2*   INR 0.98               Imaging  [id number] (WITH 1-VIEW CXR) LEFT   Final Result     "  Normal rib series.      DX-FEMUR-2+ LEFT   Final Result      Left femoral neck fracture with impaction and angulation. There is also a component of fracture extending into the greater and lesser trochanters.      DX-PELVIS-1 OR 2 VIEWS   Final Result      Left femoral neck fracture with impaction and angulation. There is a component seen extending into the lesser and greater trochanters as well.      DX-HIP-UNILATERAL-WITH PELVIS-1 VIEW LEFT    (Results Pending)   DX-PORTABLE FLUOROSCOPY < 1 HOUR Reason For Exam: Intra op/ non reportable    (Results Pending)        Assessment/Plan  * Left femoral neck fracture- (present on admission)  Assessment & Plan  S/p \"Open reduction internal fixation left intertrochanteric femur fracture with cephalomedullary implant\"    6/6: We appreciate further recommendation by orthopedic surgery.  Pending PT/OT evaluation.    Leukocytosis  Assessment & Plan  Most likely reactive  monitor    Thrombocytopenia (HCC)- (present on admission)  Assessment & Plan  Mild.  Seems to be chronic.  Monitor.    LUIS ALFREDO (generalized anxiety disorder)- (present on admission)  Assessment & Plan  6/6: continue home venlafaxine    Parkinson's disease (HCC)- (present on admission)  Assessment & Plan  6/6: continue home levodopa-carbidopa     Gastroesophageal reflux disease with esophagitis- (present on admission)  Assessment & Plan  omeprazole    Acquired hypothyroidism- (present on admission)  Assessment & Plan  continue levothyroxine    Advance care planning  Assessment & Plan  6/6: I discussed with the patient's  at bedside for at least 16 minutes further goals of care.  The patient's  was interested in filling out a POLST form.  The patient does not have capacity for decision making at this time.  I filled out a POLST form together with the patient's  at bedside.  For now the patient's  would like the patient to have CPR attempted, the patient has been would like the patient to " have full treatment options including intubation, mechanical ventilation and transferred to the ICU as needed.  For now the patient's  would like the patient to have long-term artificial nutrition/feeding tube.  The patient lacks decisional capacity.  The patient's  signed the POLST form.  I have given the POLST form to nursing to upload to the system.         VTE prophylaxis: Lovenox    I have performed a physical exam and reviewed and updated ROS and Plan today (6/5/2025). In review of yesterday's note (6/4/2025), there are no changes except as documented above.      I spend at least 52 minutes providing care for this patient.  This included face-to-face interview, physical examination.  Review of lab work including CBC, BMP.  Discussing with multidisciplinary team including case management, nursing staff and pharmacy.  Creating plan of care, reviewing orders.    Moreover, I discussed with the patient's  at bedside for at least 16 minutes further goals of care.  The patient's  was interested in filling out a POLST form.  The patient does not have capacity for decision making at this time.  I filled out a POLST form together with the patient's  at bedside.  For now the patient's  would like the patient to have CPR attempted, the patient has been would like the patient to have full treatment options including intubation, mechanical ventilation and transferred to the ICU as needed.  For now the patient's  would like the patient to have long-term artificial nutrition/feeding tube.  The patient lacks decisional capacity.  The patient's  signed the POLST form.  I have given the POLST form to nursing to upload to the system.

## 2025-06-06 NOTE — WOUND TEAM
[facility]  Inpatient Services  Wound and Skin Care Brief Evaluation    Admission Date: 6/4/2025     Last order of IP CONSULT TO WOUND CARE was found on 6/6/2025 from Hospital Encounter on 6/4/2025     HPI, PMH, SH: Reviewed    Chief Complaint   Patient presents with    Hip Pain    GLF     Diagnosis: Fracture [T14.8XXA]    Unit where seen by Wound Team: 2207/01     Wound consult placed regarding right ear and left arm. Chart and images reviewed. This discussed with bedside RN. This clinician in to assess patient. Patient pleasant and agreeable. Ears are WDL. Left elbow with a skin tear. Non-selectively debrided with Normal Saline and Gauze, no sting barrier, mepitel one and adhesive foam dressing placed. Orders in place for bedside nursing.     No pressure injuries or advanced wound care needs identified. Wound consult completed. No further follow up unless indicated and consulted.     Wound 06/04/25 Elbow Left abrasion (Active)   Date First Assessed/Time First Assessed: 06/04/25 2112   Present on Original Admission: Yes  Hand Hygiene Completed: Yes  Location: Elbow  Laterality: Left  Wound Description (Comments): abrasion      Assessments 6/6/2025  1:00 PM   Wound Image     Site Assessment Red;Drainage;Granulation tissue   Periwound Assessment Clean;Dry;Intact;Purple;Ecchymosis   Margins Attached edges;Defined edges   Closure Adhesive bandage   Drainage Amount Scant   Drainage Description Sanguineous   Treatments Cleansed;Nonselective debridement;Site care;Offloading   Wound Cleansing Normal Saline Irrigation   Periwound Protectant No-sting Skin Prep   Dressing Status Removed   Dressing Changed Reapplied   Dressing Cleansing/Solutions Not Applicable   Dressing Options Mepitel One;Silicone Adhesive Foam   Dressing Change/Treatment Frequency Weekly, and As Needed   NEXT Dressing Change/Treatment Date 06/13/25   NEXT Weekly Photo (Inpatient Only) 06/13/25   Wound Team Following Not following              PREVENTATIVE INTERVENTIONS:    Q shift Oskar - performed per nursing policy  Q shift pressure point assessments - performed per nursing policy

## 2025-06-06 NOTE — CARE PLAN
The patient is Stable - Low risk of patient condition declining or worsening    Shift Goals  Clinical Goals: Patient pablo manage pain to tolerable level, remain free from falls, wean from O2, and ambulate during shift  Patient Goals: Rest, pain control  Family Goals: SALINAS    Progress made toward(s) clinical / shift goals: Patient managing pain to tolerable level with PRN oxycodone and rest. No falls sustained. Patient weaned to room air.     Patient is not progressing towards the following goals: Patient did not ambulate 50 ft before 0000.

## 2025-06-06 NOTE — PROGRESS NOTES
4 Eyes Skin Assessment Completed by CLARI Villagomez and CLARI King.    Skin assessment is primarily focused on high risk bony prominences. Pay special attention to skin beneath and around medical devices, high risk bony prominences, skin to skin areas and areas where the patient lacks sensation to feel pain and areas where the patient previously had breakdown.     Head (Occipital):  WDL   Ears (Under Medical Devices): Right ear redness, boggy to taisha   Nose (Under Medical Devices): WDL   Mouth:  WDL   Neck: WDL   Breast/Chest:  WDL   Shoulder Blades:  WDL   Spine:   WDL   (R) Arm/Elbow/Hand: Red and Blanching   (L) Arm/Elbow/Hand: Skin Tear, Scab, Red, and Blanching, wound with offloading dressing   Abdomen: WDL   Pannus/Groin:  WDL   Sacrum/Coccyx:   Pink, Red, Blanching, and Edema   (R) Ischial Tuberosity (Sit Bones):  WDL   (L) Ischial Tuberosity (Sit Bones):  WDL   (R) Leg:  WDL   (L) Leg:  Scab, x3 surgical site with gauze/tegaderm with scant old drainage/dry/intact   (R) Heel:  Pink and Blanching   (R) Foot/Toe: WDL   (L) Heel: Pink and Blanching   (L) Foot/Toe:  WDL       DEVICES IN USE:   Respiratory Devices:  Nasal cannula  Feeding Devices:  N/A   Lines & BP Monitoring Devices:  Peripheral IV and Pulse ox    Orthopedic Devices:  N/A  Miscellaneous Devices:  Mckeon and SCDs    PROTOCOL INTERVENTIONS:   Standard/Trauma Bed:  Already in place  Q2 Turns with Pillows:  Reinforced/reapplied  Silicone Nasal Cannula Tubing:  Already in place  Nasal Cannula with Gray Foams:  Already in place    WOUND PHOTOS:   N/A no wounds identified    WOUND CONSULT:   Consult ordered for the following areas right ear, left arm wound

## 2025-06-06 NOTE — PROGRESS NOTES
Patient arrived from PACU. Patient A&OX 4. Patient denies any nausea/vomiting. CMS Intact. No needs at this time.

## 2025-06-06 NOTE — THERAPY
Physical Therapy   Initial Evaluation     Patient Name:  Kimmy Diaz  Age:  80 y.o., Sex:  female  Medical Record #:  [id number]  Today's Date: 6/6/2025     Precautions  Medical: (P) Fall Risk  Weight Bearing: (P) Weight Bearing as Tolerated Left Lower Extremity    Assessment  Patient is 80 y.o. female s/p L hip ORIF w/ WBAT orders. PMH includes hypothryoidism and Parkinson's dx in December 2024. Spouse reports an active and IPLOF before PD. Since then, caregiver supports have been utilized for ADL assistance but within the past few weeks, pt has regained some independence and utilizes no AD. Pt lives w/ her spouse in a 2SH w/ no stairs to enter and primarily utilizes the main floor. Spouse was the primary historian. Pt was AO x 2-3 and had a difficult time maintaining attention through evaluation. Spouse reports PD tremors are more active after surgery and this is not baseline. Pt tolerated evaluation fair w/ LE strength assessment and sit<>stand. Based on this, impairments include pain, bed mobility, gait, stairs, cognition, safety awareness, generalized weakness, and poor activity tolerance. Pt required extensive cueing for getting to the edge of chair and performing a sit<>stand w/ Julisa. Pt R LE tremors result in intoeing making sit<>stands difficult at this time as pt L LE painful to bear weight. Pt was educated on ankle pumps and gluteal isometrics to continue activating LE when she is less active.     Pt will be followed by acute PT while in house. Upon d/c, recommending post-acute placement to further progress pt back to IPLOF.      Plan  Physical Therapy Initial Treatment Plan   Treatment Plan : (P) Bed Mobility, Equipment, Gait Training, Manual Therapy, Neuro Re-Education / Balance, Self Care / Home Evaluation, Stair Training, Therapeutic Activities, Therapeutic Exercise  Treatment Frequency: (P) 4 Times per Week  Duration: (P) Until Therapy Goals Met    DC Equipment Recommendations: (P) Unable to  determine at this time  Discharge Recommendations: (P) Recommend post-acute placement for additional physical therapy services prior to discharge home      Objective   06/06/25 1225   Initial Contact Note    Initial Contact Note Order Received and Verified, Physical Therapy Evaluation in Progress with Full Report to Follow.   Precautions   Medical Fall Risk   Weight Bearing Weight Bearing as Tolerated Left Lower Extremity   Pain 0 - 10 Group   Location Hip;Back   Location Orientation Left;Upper   Therapist Pain Assessment Prior to Activity;During Activity;Post Activity;Nurse Notified;2  (pt c/o L hip pain and upper cervical pain)   Prior Living Situation   Prior Services Other (Comments)  (pt was diagnosed w/ PD in December 2024 - till about 3 weeks ago, she was utilizing a caregiver 5 days/week for 4 hrs/day - pt was becoming independent again and didn't utilize and AD)   Housing / Facility 2 Story House   Steps Into Home 0   Steps In Home 12   Rail None   Elevator No   Bathroom Set up Shower Chair;Walk In Shower   Equipment Owned   (walking sticks)   Lives with - Patient's Self Care Capacity Spouse   Comments pt was able to do ADLs but had assistance from spouse when needed   Prior Level of Functional Mobility   Bed Mobility Independent   Transfer Status Independent   Ambulation Independent   Ambulation Distance community   Assistive Devices Used None   Stairs Independent   History of Falls   History of Falls Yes   Date of Last Fall 06/04/25   Cognition    Cognition / Consciousness X   Speech/ Communication Delayed Responses   Level of Consciousness Alert   Ability To Follow Commands 1 Step   Attention Impaired   Comments pt was pleasant and cooperative - required extensive cueing for sit<>stand UE and LE placement - pt was focused on her spouse and often asked about who was in the hallway   Passive ROM Upper Body   Passive ROM Upper Body X   Comments R UE appears limited due to cervical tightness   Active ROM  Upper Body   Active ROM Upper Body  X   Dominant Hand Right   Comments R UE appears limited due to cervical tightness   Sensation Upper Body   Upper Extremity Sensation  WDL   Passive ROM Lower Body   Passive ROM Lower Body X   Comments limited L LE due to postop pain - pt demonstrates tremors in R LE   Active ROM Lower Body    Active ROM Lower Body  X   Comments limited L LE due to postop pain - pt demonstrates tremors in R LE   Strength Lower Body   Lower Body Strength  X   Comments limited L LE due to postop pain - pt demonstrates tremors in R LE - difficulty bearing weight through the LEs   Sensation Lower Body   Lower Extremity Sensation   WDL   Neurological Concerns   Comments hx of Parkinsons   Vision   Vision Comments WNL   Balance Assessment   Sitting Balance (Static) Fair   Sitting Balance (Dynamic) Fair -   Standing Balance (Static) Poor -   Standing Balance (Dynamic) Trace +   Weight Shift Sitting Fair   Weight Shift Standing Poor   Bed Mobility    Comments pt upright in chair at start/end of session   Gait Analysis   Gait Level Of Assist Unable to Participate   Weight Bearing Status WBAT L LE   Functional Mobility   Sit to Stand Minimal Assist   Bed, Chair, Wheelchair Transfer Minimal Assist   Transfer Method Stand Step   Mobility upright, sit<>stand, back to chair   6 Clicks Assessment - How much HELP from from another person do you currently need... (If the patient hasn't done an activity recently, how much help from another person do you think he/she would need if he/she tried?)   Turning from your back to your side while in a flat bed without using bedrails? 3   Moving from lying on your back to sitting on the side of a flat bed without using bedrails? 2   Moving to and from a bed to a chair (including a wheelchair)? 2   Standing up from a chair using your arms (e.g., wheelchair, or bedside chair)? 2   Walking in hospital room? 1   Climbing 3-5 steps with a railing? 1   6 clicks Mobility Score 11    Activity Tolerance   Sitting in Chair functional   Sitting Edge of Bed NT   Standing 2 mins   Comments pt noted fatigue in UE and LE w/ standing   Edema / Skin Assessment   Edema / Skin  WDL   Patient / Family Goals    Patient / Family Goal #1 pt and spouse want her to return home and work towards improving LOF   Short Term Goals    Short Term Goal # 1 in 6tx, pt will be Julisa w/ bed mobility to demo improved functional mobility   Short Term Goal # 2 in 6tx, pt will be SBA w/ sit<>stand transfers to demonstrate improved LE strength//function   Short Term Goal # 3 in 6tx, pt will ambulate 5 steps w/ FWW and Julisa to show improved ability to utilize BSC   Education Group   Education Provided Role of Physical Therapist;Use of Assistive Device   Role of Physical Therapist Patient Response Patient;Significant Other;Acceptance;Explanation;Verbal Demonstration   Use of Assistive Device Patient Response Patient;Significant Other;Acceptance;Explanation;No Learning Evidence   Physical Therapy Initial Treatment Plan    Treatment Plan  Bed Mobility;Equipment;Gait Training;Manual Therapy;Neuro Re-Education / Balance;Self Care / Home Evaluation;Stair Training;Therapeutic Activities;Therapeutic Exercise   Treatment Frequency 4 Times per Week   Duration Until Therapy Goals Met   Problem List    Problems Pain;Impaired Bed Mobility;Impaired Transfers;Impaired Ambulation;Functional ROM Deficit;Functional Strength Deficit;Impaired Balance;Decreased Activity Tolerance;Motor Planning / Sequencing;Limited Knowledge of Post-Op Precautions   Anticipated Discharge Equipment and Recommendations   DC Equipment Recommendations Unable to determine at this time   Discharge Recommendations Recommend post-acute placement for additional physical therapy services prior to discharge home   Interdisciplinary Plan of Care Collaboration   IDT Collaboration with  Family / Caregiver;Nursing;Occupational Therapist   Patient Position at End of Therapy  Seated;Tray Table within Reach;Call Light within Reach;Phone within Reach;Family / Friend in Room   Collaboration Comments RN aware of visit and recs   Session Information   Date / Session Number  6/6 - 1 (1/4, 6/12)

## 2025-06-06 NOTE — PROGRESS NOTES
Bedside report received from CLARI Shafer. Patient resting in bed. Call bell within reach, bed alarm on and in place. All belongings within reach for patient. No further needs at this time.

## 2025-06-07 PROBLEM — R73.9 HYPERGLYCEMIA: Status: ACTIVE | Noted: 2025-06-07

## 2025-06-07 LAB
ANION GAP SERPL CALC-SCNC: 12 MMOL/L (ref 7–16)
BUN SERPL-MCNC: 24 MG/DL (ref 8–22)
CALCIUM SERPL-MCNC: 8.3 MG/DL (ref 8.5–10.5)
CHLORIDE SERPL-SCNC: 102 MMOL/L (ref 96–112)
CO2 SERPL-SCNC: 24 MMOL/L (ref 20–33)
CREAT SERPL-MCNC: 0.64 MG/DL (ref 0.5–1.4)
ERYTHROCYTE [DISTWIDTH] IN BLOOD BY AUTOMATED COUNT: 45.3 FL (ref 35.9–50)
EST. AVERAGE GLUCOSE BLD GHB EST-MCNC: 108 MG/DL
GFR SERPLBLD CREATININE-BSD FMLA CKD-EPI: 90 ML/MIN/1.73 M 2
GLUCOSE SERPL-MCNC: 115 MG/DL (ref 65–99)
HBA1C MFR BLD: 5.4 % (ref 4–5.6)
HCT VFR BLD AUTO: 28.1 % (ref 37–47)
HCT VFR BLD AUTO: 28.3 % (ref 37–47)
HGB BLD-MCNC: 9.2 G/DL (ref 12–16)
HGB BLD-MCNC: 9.3 G/DL (ref 12–16)
MCH RBC QN AUTO: 30.4 PG (ref 27–33)
MCHC RBC AUTO-ENTMCNC: 32.5 G/DL (ref 32.2–35.5)
MCV RBC AUTO: 93.4 FL (ref 81.4–97.8)
PLATELET # BLD AUTO: 134 K/UL (ref 164–446)
PMV BLD AUTO: 11.7 FL (ref 9–12.9)
POTASSIUM SERPL-SCNC: 3.9 MMOL/L (ref 3.6–5.5)
RBC # BLD AUTO: 3.03 M/UL (ref 4.2–5.4)
SODIUM SERPL-SCNC: 138 MMOL/L (ref 135–145)
WBC # BLD AUTO: 10.6 K/UL (ref 4.8–10.8)

## 2025-06-07 PROCEDURE — 80048 BASIC METABOLIC PNL TOTAL CA: CPT

## 2025-06-07 PROCEDURE — 36415 COLL VENOUS BLD VENIPUNCTURE: CPT

## 2025-06-07 PROCEDURE — 85027 COMPLETE CBC AUTOMATED: CPT

## 2025-06-07 PROCEDURE — 85018 HEMOGLOBIN: CPT

## 2025-06-07 PROCEDURE — A9270 NON-COVERED ITEM OR SERVICE: HCPCS | Performed by: HOSPITALIST

## 2025-06-07 PROCEDURE — 99024 POSTOP FOLLOW-UP VISIT: CPT | Performed by: NURSE PRACTITIONER

## 2025-06-07 PROCEDURE — 85014 HEMATOCRIT: CPT

## 2025-06-07 PROCEDURE — 94760 N-INVAS EAR/PLS OXIMETRY 1: CPT

## 2025-06-07 PROCEDURE — 700111 HCHG RX REV CODE 636 W/ 250 OVERRIDE (IP): Mod: JZ | Performed by: INTERNAL MEDICINE

## 2025-06-07 PROCEDURE — 83036 HEMOGLOBIN GLYCOSYLATED A1C: CPT

## 2025-06-07 PROCEDURE — 700102 HCHG RX REV CODE 250 W/ 637 OVERRIDE(OP): Performed by: HOSPITALIST

## 2025-06-07 PROCEDURE — 770001 HCHG ROOM/CARE - MED/SURG/GYN PRIV*

## 2025-06-07 PROCEDURE — 99233 SBSQ HOSP IP/OBS HIGH 50: CPT | Performed by: INTERNAL MEDICINE

## 2025-06-07 RX ADMIN — CARBIDOPA AND LEVODOPA 1 TABLET: 25; 100 TABLET ORAL at 15:27

## 2025-06-07 RX ADMIN — LEVOTHYROXINE SODIUM 50 MCG: 0.05 TABLET ORAL at 11:30

## 2025-06-07 RX ADMIN — CARBIDOPA AND LEVODOPA 1 TABLET: 25; 100 TABLET ORAL at 11:25

## 2025-06-07 RX ADMIN — SENNOSIDES AND DOCUSATE SODIUM 2 TABLET: 50; 8.6 TABLET ORAL at 17:14

## 2025-06-07 RX ADMIN — ENOXAPARIN SODIUM 40 MG: 100 INJECTION SUBCUTANEOUS at 17:14

## 2025-06-07 RX ADMIN — VENLAFAXINE HYDROCHLORIDE 150 MG: 75 CAPSULE, EXTENDED RELEASE ORAL at 11:30

## 2025-06-07 RX ADMIN — CARBIDOPA AND LEVODOPA 1 TABLET: 25; 100 TABLET ORAL at 20:42

## 2025-06-07 ASSESSMENT — PAIN SCALES - PAIN ASSESSMENT IN ADVANCED DEMENTIA (PAINAD)
CONSOLABILITY: NO NEED TO CONSOLE
TOTALSCORE: 1
FACIALEXPRESSION: SAD, FRIGHTENED, FROWN
BREATHING: NORMAL
BODYLANGUAGE: RELAXED

## 2025-06-07 ASSESSMENT — PAIN DESCRIPTION - PAIN TYPE: TYPE: SURGICAL PAIN

## 2025-06-07 NOTE — THERAPY
Occupational Therapy   Initial Evaluation     Patient Name:  Kimmy Diaz  Age:  80 y.o., Sex:  female  Medical Record #:  [id number]  Today's Date:  6/6/2025     Medical: Fall Risk  Weight Bearing: Weight Bearing as Tolerated Left Lower Extremity    Assessment  Patient is 80 y.o. female s/p ORIF L intertrochanteric femur fracture, POD 1. Additional factors influencing patient status / progress: Diagnosed with Parkinson's in Dec '24. Lives with spouse, pt quite independent during the past 3 weeks. Prior had caregivers visit 5 days/wk for 4 hrs in the morning. Pt is A&Ox2-3; confusion present. Increased lethargy as session progresses. PLOF obtained mostly from spouse. BUE (R>L), RLE resting tremors noted (spouse reports has increased post surgery). Currently limited by weakness, impaired coordination, balance, cognition, decreased activity tolerance and functional mobility which is impacting her ADL performance. See below for CLOF, goals, recs. OT will follow while in house, recommend post acute inpt rehab prior to return home.           Plan  Occupational Therapy Initial Treatment Plan   Treatment Interventions: Self Care / Activities of Daily Living, Neuro Re-Education / Balance, Therapeutic Activity  Treatment Frequency: 4 Times per Week  Duration: Until Therapy Goals Met    DC Equipment Recommendations: Unable to determine at this time  Discharge Recommendations: Recommend post-acute placement for additional occupational therapy services prior to discharge home     Subjective  Agreeable     Objective   06/06/25 1224   Prior Living Situation   Prior Services Intermittent Physical Support for ADL Per Family   Housing / Facility 2 Story House   Bathroom Set up Walk In Shower;Shower Chair   Equipment Owned Tub / Shower Seat   Lives with - Patient's Self Care Capacity Spouse   Comments Pt diagnosed with Parkinson's in Dec '24; spouse reports caregivers visited 5x/wk for 4 hrs in AM up until 3 wks ago due to pt  regaining indep. @ sons in town as well.   Prior Level of ADL Function   Self Feeding Independent   Grooming / Hygiene Independent   Bathing Requires Assist   Dressing Independent   Toileting Independent   Prior Level of IADL Function   Medication Management Requires Assist   Laundry Requires Assist   Kitchen Mobility Independent   Finances Requires Assist   Home Management Requires Assist   Shopping Requires Assist   Prior Level Of Mobility Independent With Device in Home   Driving / Transportation Relatives / Others Provide Transportation   Occupation (Pre-Hospital Vocational) Retired Due To Age   Leisure Interests Family;Hobbies;Exercise   History of Falls   History of Falls Yes   Precautions   Medical Fall Risk   Weight Bearing Weight Bearing as Tolerated Left Lower Extremity   Vitals   O2 Delivery Device None - Room Air   Pain 0 - 10 Group   Location Hip;Neck   Location Orientation Left;Posterior   Therapist Pain Assessment Post Activity Pain Same as Prior to Activity;Nurse Notified   Cognition    Cognition / Consciousness X   Speech/ Communication Delayed Responses;Hard of Hearing   Level of Consciousness Alert   Ability To Follow Commands 1 Step   Attention Impaired   Comments pleasantly confused, asks for her spouse to respond to questions. Ox2-3. Poor attn span. Max cueing for STS, seated grooming.   Passive ROM Upper Body   Passive ROM Upper Body X   Comments R limited, pt c/o posterior neck pain   Active ROM Upper Body   Active ROM Upper Body  X   Dominant Hand Right   Comments Shoulder flexion limited, R>L.   Strength Upper Body   Upper Body Strength  X   Comments 4-/5 throughout BUE's   Sensation Upper Body   Upper Extremity Sensation  WDL   Upper Body Muscle Tone   Upper Body Muscle Tone  WDL   Coordination Upper Body   Coordination X   Comments resting tremors, R>L; spouse reports worse since after surgery   Balance Assessment   Sitting Balance (Static) Fair   Sitting Balance (Dynamic) Fair -    Standing Balance (Static) Poor -   Standing Balance (Dynamic) Trace +   Weight Shift Sitting Fair   Weight Shift Standing Poor   ADL Assessment   Eating Minimal Assist   Grooming Moderate Assist;Seated   Upper Body Dressing Moderate Assist   Lower Body Dressing Total Assist   Toileting Total Assist   How much help from another person does the patient currently need...   Putting on and taking off regular lower body clothing? 1   Bathing (including washing, rinsing, and drying)? 2   Toileting, which includes using a toilet, bedpan, or urinal? 1   Putting on and taking off regular upper body clothing? 2   Taking care of personal grooming such as brushing teeth? 3   Eating meals? 3   6 Clicks Daily Activity Score 12   Functional Mobility   Sit to Stand Moderate Assist   Bed, Chair, Wheelchair Transfer Moderate Assist   Transfer Method Stand Step   Comments FWW, max cues   Visual Perception   Visual Perception  WDL   Activity Tolerance   Sitting in Chair >1 hr; pre/post session   Standing 2   Patient / Family Goals   Patient / Family Goal #1 Home after rehab   Short Term Goals   Short Term Goal # 1 Pt will perform feeding with set-up, Spv within 5  days   Short Term Goal # 2 Pt will perform ADL transfers with CGA within 5 days   Short Term Goal # 3 Pt will perform seated grooming with SBA within 5 days   Short Term Goal # 4 Pt will perform toileting at [facility] with CGA within 5 days   Education Group   Role of Occupational Therapist Patient Response Patient;Family;Acceptance;Explanation;Verbal Demonstration   Occupational Therapy Initial Treatment Plan    Treatment Interventions Self Care / Activities of Daily Living;Neuro Re-Education / Balance;Therapeutic Activity   Treatment Frequency 4 Times per Week   Duration Until Therapy Goals Met   Problem List   Problem List Decreased Active Daily Living Skills;Decreased Homemaking Skills;Decreased Upper Extremity Strength Right;Decreased Upper Extremity Strength Left;Decreased  Upper Extremity AROM Right;Decreased Upper Extremity AROM Left;Decreased Functional Mobility;Decreased Activity Tolerance;Safety Awareness Deficits / Cognition;Impaired Coordination Right Upper Extremity;Impaired Coordination Left Upper Extremity;Impaired Postural Control / Balance   Anticipated Discharge Equipment and Recommendations   DC Equipment Recommendations Unable to determine at this time   Discharge Recommendations Recommend post-acute placement for additional occupational therapy services prior to discharge home   Interdisciplinary Plan of Care Collaboration   IDT Collaboration with  Family / Caregiver;Nursing;Certified Nursing Assistant;Physical Therapist   Patient Position at End of Therapy Seated;Call Light within Reach;Tray Table within Reach;Phone within Reach;Family / Friend in Room   Collaboration Comments OT Chadwick. Marcos planning.

## 2025-06-07 NOTE — PROGRESS NOTES
Bedside report received from CLARI Villagomez. Assumed care of patient. Daily plan of care discussed. Pt resting comfortably in bed with no signs of distress noted. Breathing even and unlabored. Currently has a elmore.. Patient reports pain level 0/10. No needs at this time. Call light within reach. Bed locked in lowest position. Plan of care on going.    0915: elmore removed.     1530: Bladder scan done: 187mL

## 2025-06-07 NOTE — PROGRESS NOTES
4 Eyes Skin Assessment Completed by Ma, RN and CLARI Mckenzie.    Skin assessment is primarily focused on high risk bony prominences. Pay special attention to skin beneath and around medical devices, high risk bony prominences, skin to skin areas and areas where the patient lacks sensation to feel pain and areas where the patient previously had breakdown.     Head (Occipital):  WDL   Ears (Under Medical Devices): Red, boggy, blanching   Nose (Under Medical Devices): WDL   Mouth:  WDL   Neck: WDL   Breast/Chest:  Scab and Red under R breast   Shoulder Blades:  WDL   Spine:   WDL   (R) Arm/Elbow/Hand: Red and Blanching   (L) Arm/Elbow/Hand: Skin Tear, Scab, Red, Blanching, and Bruising   Abdomen: WDL   Pannus/Groin:  WDL   Sacrum/Coccyx:   Pink, Red, and Blanching   (R) Ischial Tuberosity (Sit Bones):  WDL   (L) Ischial Tuberosity (Sit Bones):  WDL   (R) Leg:  Scab   (L) Leg:  Scab, swelling, x3 surgical incision with dressing  with small old drainage; dry, intact   (R) Heel:  Pink and Blanching   (R) Foot/Toe: WDL   (L) Heel: Pink and Blanching   (L) Foot/Toe:  WDL       DEVICES IN USE:   Respiratory Devices:  Pulse ox  Feeding Devices:  N/A   Lines & BP Monitoring Devices:  Peripheral IV, BP cuff, and Pulse ox    Orthopedic Devices:  N/A  Miscellaneous Devices:  SCDs    PROTOCOL INTERVENTIONS:   Standard/Trauma Bed:  Already in place  Q2 Turns with Pillows:  Reinforced/reapplied    WOUND PHOTOS:   N/A no wounds identified    WOUND CONSULT:   Wound team already following area(s) of concern: R ear, left arm wound

## 2025-06-07 NOTE — PROGRESS NOTES
"Orthopedic Physician Assistant Note    Subjective:  Sitting in chair, family at bs.  \"Feeling ok\"  Objective:  Pleasantly confused with some difficulty verbally communicating.  Dressing c/d/I.  Soft thigh and calf compartments.     Blood pressure 127/68, pulse 99, temperature 36.9 °C (98.4 °F), temperature source Temporal, resp. rate 18, height 1.524 m (5'), weight 49 kg (108 lb 0.4 oz), SpO2 96%.    Labs:  Recent Labs     06/04/25  1320 06/05/25  0806 06/06/25  0532 06/07/25  0142 06/07/25  1049   WBC 8.2 12.5* 10.7 10.6  --    RBC 4.47 3.97* 3.59* 3.03*  --    HEMOGLOBIN 13.8 12.2 10.9* 9.2* 9.3*   HEMATOCRIT 41.0 36.9* 33.9* 28.3* 28.1*   MCV 91.7 92.9 94.4 93.4  --    MCH 30.9 30.7 30.4 30.4  --    RDW 43.5 44.3 45.8 45.3  --    PLATELETCT 163* 155* 150* 134*  --    MPV 11.5 11.4 11.7 11.7  --    NEUTSPOLYS 77.80*  --   --   --   --    LYMPHOCYTES 14.70*  --   --   --   --    MONOCYTES 6.50  --   --   --   --    EOSINOPHILS 0.20  --   --   --   --    BASOPHILS 0.40  --   --   --   --          Recent Labs     06/05/25  0806 06/06/25  0532 06/07/25  0142   SODIUM 137 137 138   POTASSIUM 4.0 4.2 3.9   CHLORIDE 102 100 102   CO2 24 26 24   GLUCOSE 108* 114* 115*   BUN 24* 17 24*       Results       ** No results found for the last 168 hours. **            Assessment:  POD 2 Open reduction internal fixation left intertrochanteric femur fracture with cephalomedullary implant  Plan:  Will likely d/c to rehab/snf.  WBAT.   Inpatient plan: PACU to floor. 24 hours of antibiotics.  Mobilize with PT/OT.  Weightbearing status:  WBAT LLE  DVT prophylaxis: SCDs and lovenox until mobilizing well, then aspirin 81mg BID for 4 weeks.  If the patient is on baseline anticoagulation then they may resume their medication 24 hours postoperatively.  Outpatient plan: The patient will follow up in clinic in 2 weeks for wound check, suture/staple removal, and xrays.  If the patient is at a facility at that time, wound check and " suture/staple removal may be performed by nursing care and the patient should instead follow up at the 6 week post operative tori for repeat clinical check and xrays

## 2025-06-07 NOTE — PROGRESS NOTES
"Pt pulled PIV, refusing meds and BP check this morning, woke up anxious. Redirected and given emotional support, this RN offered if she wants to talk to  via phone, pt refused, keeps saying \"no please, not now.\"  "

## 2025-06-07 NOTE — CARE PLAN
The patient is Stable - Low risk of patient condition declining or worsening    Shift Goals  Clinical Goals: Pt will void and have a BM; pt will remain free from falls.  Patient Goals: Sleep and rest  Family Goals: Update for any POC    Progress made toward(s) clinical / shift goals:  Pt voided; no BM tonight but is passing gas; still requires x2 CGA assist for transfer to BSC. Anxious and confuse this morning, redirected and assisted with phone call to .    Patient is not progressing towards the following goals:      Problem: Early Mobilization - Post Surgery  Goal: Early mobilization post surgery  6/7/2025 0104 by Jaswinder Mills, R.N.  Outcome: Progressing  6/7/2025 0102 by Jaswinder Mills, R.N.  Outcome: Not Progressing     Problem: Bowel/Gastric:  Goal: Normal bowel function is maintained or improved  Outcome: Not Progressing

## 2025-06-07 NOTE — DISCHARGE PLANNING
Case Management Discharge Planning    Admission Date: 6/4/2025  GMLOS: 3.1  ALOS: 3    6-Clicks ADL Score: 12  6-Clicks Mobility Score: 11  PT and/or OT Eval ordered: Yes  Post-acute Referrals Ordered: Yes  Post-acute Choice Obtained: No  Has referral(s) been sent to post-acute provider:  Yes      Anticipated Discharge Dispo: Discharge Disposition: D/T to SNF with Medicare cert in anticipation of skilled care (03)    DME Needed: No    Action(s) Taken: LSW spoke with pt's family at bedside regarding DC disposition. List of accepting and pending SNF facilities given to family. Family expressed [facility] will likely be their first choice, however requesting additional time overnight to review choices.    VM left for [facility] at [facility].    [facility] #[id number]    Escalations Completed: None    Medically Clear: No    Next Steps: care coordination will f/u with pt's family for SNF choice    Barriers to Discharge: Medical clearance and Pending Placement    Is the patient up for discharge tomorrow: No    Care Transition Team Assessment    Information Source  Orientation Level: Oriented to situation, Oriented to person, Oriented to place  Information Given By: Other (Comments) (chart)  Who is responsible for making decisions for patient? : Spouse  Name(s) of Primary Decision Maker: Alonso Diaz    Readmission Evaluation  Is this a readmission?: No    Elopement Risk  Legal Hold: No  Ambulatory or Self Mobile in Wheelchair: No-Not an Elopement Risk  Disoriented: No  Psychiatric Symptoms: None  History of Wandering: No  Elopement this Admit: No  Vocalizing Wanting to Leave: No  Displays Behaviors, Body Language Wanting to Leave: No-Not at Risk for Elopement  Elopement Risk: Not at Risk for Elopement    Interdisciplinary Discharge Planning  Lives with - Patient's Self Care Capacity: Spouse  Patient or legal guardian wants to designate a caregiver: No  Support Systems: Family Member(s), Spouse / Significant Other  Housing /  Facility: [address] [place] [address]  Prior Services: Other (Comments) (pt was diagnosed w/ PD in December 2024 - till about 3 weeks ago, she was utilizing a caregiver 5 days/week for 4 hrs/day - pt was becoming independent again and didn't utilize and AD)    Discharge Preparedness  What is your plan after discharge?: Skilled nursing facility  What are your discharge supports?: Spouse, Child  Prior Functional Level: Ambulatory, Needs Assist with Activities of Daily Living, Needs Assist with Medication Management  Difficulity with ADLs: Bathing  Difficulity with IADLs: Cooking, Driving, Keeping track of finances, Laundry, Managing medication, Shopping    Functional Assesment  Prior Functional Level: Ambulatory, Needs Assist with Activities of Daily Living, Needs Assist with Medication Management    Finances  Financial Barriers to Discharge: No  Prescription Coverage: Yes    Vision / Hearing Impairment  Vision Impairment : No  Hearing Impairment : No    [facility]vance Directive  [facility] Directive?: POLST    Domestic Abuse  Have you ever been the victim of abuse or violence?: No  Possible Abuse/Neglect Reported to:: Not Applicable    Psychological Assessment  History of Substance Abuse: None  History of Psychiatric Problems: No  Non-compliant with Treatment: No  Newly Diagnosed Illness: No    Discharge Risks or Barriers  Discharge risks or barriers?: No  Patient risk factors: Vulnerable adult, Cognitive / sensory / physical deficit    Anticipated Discharge Information  Discharge Disposition: D/T to SNF with Medicare cert in anticipation of skilled care (03)

## 2025-06-07 NOTE — PROGRESS NOTES
"Hospital Medicine Daily Progress Note    Date of Service  6/7/2025    Chief Complaint  Kimmy Diaz is a 80 y.o. female admitted 6/4/2025 with fall    Hospital Course  Per chart review:  \"80 y.o. female with a past medical history of Parkinson's disease, and hypothyroidism who presented 6/4/2025 with hip pain after a fall.  Pain is severe rated 10/10.  The pain is worse with movement.  Patient tripped and fell as she was hiking on a trail.  She denies hitting her head or losing consciousness.\"     Interval Problem Update  6/5: Patient seen at bedside this morning.  Patient lying in bed, not complaining of overt pain with not moving.  Patient n.p.o. as she will undergo surgery today by orthopedic surgery.  We appreciate further recommendations.  Patient's son at bedside at the time of my evaluation today.    6/6: Patient seen at bedside this morning.  Patient lying in bed, currently not complaining of overt pain.  She seems to be more confused than yesterday.  As per the patient's  the patient's son patient with some cognitive decline in the last couple days.  This could be due to her Parkinson's.  May be some underlying dementia.  Will monitor closely.  On examination there is some blood on the dressings from surgery from yesterday however she seems to be hemodynamically stable.  Will continue with Lovenox for DVT prophylaxis for now and monitor closely.  We appreciate further recommendations by orthopedic surgery.    6/7: Patient seen at bedside this morning.  Patient lying in bed, currently not complaining of her pain.  She seems to be a little bit more alert than yesterday.  No family member present at the time of my evaluation.  I tried to call the patient's  over the phone however there was no answer.  Hemoglobin seems to be stable.  We are pending placement.  We appreciate further recommendation by orthopedic surgery.  Continue to monitor closely.    I have discussed this patient's plan " of care and discharge plan at IDT rounds today with Case Management, Nursing, Nursing leadership, and other members of the IDT team.    Consultants/Specialty  orthopedics    Code Status  Full Code    Disposition  Medically Cleared    Review of Systems  Review of Systems   Unable to perform ROS: Mental acuity        Physical Exam  Temp:  [36.7 °C (98.1 °F)-37.2 °C (99 °F)] 36.9 °C (98.4 °F)  Pulse:  [81-99] 99  Resp:  [16-18] 18  BP: (114-143)/(59-68) 127/68  SpO2:  [91 %-96 %] 96 %    Physical Exam  Vitals and nursing note reviewed.   Constitutional:       General: She is not in acute distress.     Appearance: She is ill-appearing.   HENT:      Head: Normocephalic and atraumatic.      Right Ear: External ear normal.      Left Ear: External ear normal.      Mouth/Throat:      Pharynx: No oropharyngeal exudate or posterior oropharyngeal erythema.   Eyes:      General:         Right eye: No discharge.         Left eye: No discharge.   Cardiovascular:      Rate and Rhythm: Normal rate and regular rhythm.      Pulses: Normal pulses.      Heart sounds: No murmur heard.     No gallop.   Pulmonary:      Effort: No respiratory distress.   Abdominal:      General: There is no distension.      Palpations: Abdomen is soft.      Tenderness: There is no abdominal tenderness.   Musculoskeletal:         General: Tenderness present.      Cervical back: Normal range of motion and neck supple. No tenderness.      Comments: Dressing in place   Skin:     General: Skin is warm and dry.   Neurological:      Mental Status: She is alert and oriented to person, place, and time.   Psychiatric:         Mood and Affect: Mood normal.         Behavior: Behavior normal.         Fluids    Intake/Output Summary (Last 24 hours) at 6/7/2025 1402  Last data filed at 6/7/2025 1100  Gross per 24 hour   Intake 360 ml   Output 600 ml   Net -240 ml        Laboratory  Recent Labs     06/05/25  0806 06/06/25  0532 06/07/25  0142 06/07/25  1049   WBC 12.5*  "10.7 10.6  --    RBC 3.97* 3.59* 3.03*  --    HEMOGLOBIN 12.2 10.9* 9.2* 9.3*   HEMATOCRIT 36.9* 33.9* 28.3* 28.1*   MCV 92.9 94.4 93.4  --    MCH 30.7 30.4 30.4  --    MCHC 33.1 32.2 32.5  --    RDW 44.3 45.8 45.3  --    PLATELETCT 155* 150* 134*  --    MPV 11.4 11.7 11.7  --      Recent Labs     06/05/25  0806 06/06/25  0532 06/07/25  0142   SODIUM 137 137 138   POTASSIUM 4.0 4.2 3.9   CHLORIDE 102 100 102   CO2 24 26 24   GLUCOSE 108* 114* 115*   BUN 24* 17 24*   CREATININE 0.70 0.69 0.64   CALCIUM 8.6 8.1* 8.3*                     Imaging  DX-HIP-UNILATERAL-WITH PELVIS-1 VIEW LEFT   Final Result      Digitized intraoperative radiograph is submitted for review. This examination is not for diagnostic purpose but for guidance during a surgical procedure. Please see the patient's chart for full procedural details.         INTERPRETING LOCATION: [address] [place] [address], [id number]      DX-PORTABLE FLUOROSCOPY < 1 HOUR Reason For Exam: Intra op/ non reportable   Final Result      Portable fluoroscopy utilized for 44 seconds.      INTERPRETING LOCATION: [address] [place] [address], [facility], [id number]      [id number] (WITH 1-VIEW CXR) LEFT   Final Result      Normal rib series.      DX-FEMUR-2+ LEFT   Final Result      Left femoral neck fracture with impaction and angulation. There is also a component of fracture extending into the greater and lesser trochanters.      DX-PELVIS-1 OR 2 VIEWS   Final Result      Left femoral neck fracture with impaction and angulation. There is a component seen extending into the lesser and greater trochanters as well.           Assessment/Plan  * Left femoral neck fracture- (present on admission)  Assessment & Plan  S/p \"Open reduction internal fixation left intertrochanteric femur fracture with cephalomedullary implant\"    6/7: We appreciate further recommendation by orthopedic surgery.  Pending PT/OT evaluation.    Hyperglycemia  Assessment & Plan  Most likely " reactive  monitor    Leukocytosis  Assessment & Plan  Most likely reactive  monitor    Thrombocytopenia (HCC)- (present on admission)  Assessment & Plan  Mild.  Seems to be chronic.  Monitor.    LUIS ALFREDO (generalized anxiety disorder)- (present on admission)  Assessment & Plan  6/7: continue home venlafaxine    Parkinson's disease (HCC)- (present on admission)  Assessment & Plan  6/7: continue home levodopa-carbidopa     Gastroesophageal reflux disease with esophagitis- (present on admission)  Assessment & Plan  omeprazole    Acquired hypothyroidism- (present on admission)  Assessment & Plan  continue levothyroxine    Advance care planning  Assessment & Plan  6/6: I discussed with the patient's  at bedside for at least 16 minutes further goals of care.  The patient's  was interested in filling out a POLST form.  The patient does not have capacity for decision making at this time.  I filled out a POLST form together with the patient's  at bedside.  For now the patient's  would like the patient to have CPR attempted, the patient has been would like the patient to have full treatment options including intubation, mechanical ventilation and transferred to the ICU as needed.  For now the patient's  would like the patient to have long-term artificial nutrition/feeding tube.  The patient lacks decisional capacity.  The patient's  signed the POLST form.  I have given the POLST form to nursing to upload to the system.         VTE prophylaxis: Lovenox    I have performed a physical exam and reviewed and updated ROS and Plan today (6/7/2025). In review of yesterday's note (6/6/2025), there are no changes except as documented above.      I spend at least 51 minutes providing care for this patient.  This included face-to-face interview, physical examination.  Review of lab work including CBC,Hb, BMP.  Discussing with multidisciplinary team including case management, nursing staff and  pharmacy.  Creating plan of care, reviewing orders.

## 2025-06-07 NOTE — PROGRESS NOTES
Received report from day shift RN. Assumed care of pt. Pt A&O X 4, on RA, sitting up in chair, eating dinner with family at bedside. Pt reports tolerable pain at this time. Pt updated with POC, fall precautions in place, call light within reach. All needs met at this time.    2040 Pt unable to void since ramírez was dc'd. Pt assisted to BSC, unable to void. Bladder scan performed, 270 ml scanned.     2353 Pt assisted to BSC, voided 300 ml.

## 2025-06-07 NOTE — CARE PLAN
The patient is Stable - Low risk of patient condition declining or worsening    Shift Goals  Clinical Goals: Pain controlled to less than <4, up in chair for meals. take at least 10 steps  Patient Goals: relax  Family Goals: Update for any POC    Progress made toward(s) clinical / shift goals:  Pain managed to 4/10 or less post prn meds per MAR.    Patient is not progressing towards the following goals:

## 2025-06-08 PROBLEM — R41.82 ALTERED MENTAL STATUS: Status: ACTIVE | Noted: 2025-06-08

## 2025-06-08 LAB
ANION GAP SERPL CALC-SCNC: 10 MMOL/L (ref 7–16)
BUN SERPL-MCNC: 22 MG/DL (ref 8–22)
CALCIUM SERPL-MCNC: 8.6 MG/DL (ref 8.5–10.5)
CHLORIDE SERPL-SCNC: 104 MMOL/L (ref 96–112)
CO2 SERPL-SCNC: 26 MMOL/L (ref 20–33)
CREAT SERPL-MCNC: 0.54 MG/DL (ref 0.5–1.4)
ERYTHROCYTE [DISTWIDTH] IN BLOOD BY AUTOMATED COUNT: 46.3 FL (ref 35.9–50)
GFR SERPLBLD CREATININE-BSD FMLA CKD-EPI: 93 ML/MIN/1.73 M 2
GLUCOSE SERPL-MCNC: 105 MG/DL (ref 65–99)
HCT VFR BLD AUTO: 25.7 % (ref 37–47)
HGB BLD-MCNC: 8.4 G/DL (ref 12–16)
MCH RBC QN AUTO: 30.7 PG (ref 27–33)
MCHC RBC AUTO-ENTMCNC: 32.7 G/DL (ref 32.2–35.5)
MCV RBC AUTO: 93.8 FL (ref 81.4–97.8)
PLATELET # BLD AUTO: 141 K/UL (ref 164–446)
PMV BLD AUTO: 11.3 FL (ref 9–12.9)
POTASSIUM SERPL-SCNC: 3.6 MMOL/L (ref 3.6–5.5)
RBC # BLD AUTO: 2.74 M/UL (ref 4.2–5.4)
SODIUM SERPL-SCNC: 140 MMOL/L (ref 135–145)
WBC # BLD AUTO: 10.4 K/UL (ref 4.8–10.8)

## 2025-06-08 PROCEDURE — 94760 N-INVAS EAR/PLS OXIMETRY 1: CPT

## 2025-06-08 PROCEDURE — A9270 NON-COVERED ITEM OR SERVICE: HCPCS | Performed by: INTERNAL MEDICINE

## 2025-06-08 PROCEDURE — 85027 COMPLETE CBC AUTOMATED: CPT

## 2025-06-08 PROCEDURE — 770001 HCHG ROOM/CARE - MED/SURG/GYN PRIV*

## 2025-06-08 PROCEDURE — A9270 NON-COVERED ITEM OR SERVICE: HCPCS | Performed by: HOSPITALIST

## 2025-06-08 PROCEDURE — 700102 HCHG RX REV CODE 250 W/ 637 OVERRIDE(OP): Performed by: HOSPITALIST

## 2025-06-08 PROCEDURE — 36415 COLL VENOUS BLD VENIPUNCTURE: CPT

## 2025-06-08 PROCEDURE — 51798 US URINE CAPACITY MEASURE: CPT

## 2025-06-08 PROCEDURE — 700111 HCHG RX REV CODE 636 W/ 250 OVERRIDE (IP): Mod: JZ | Performed by: INTERNAL MEDICINE

## 2025-06-08 PROCEDURE — 700102 HCHG RX REV CODE 250 W/ 637 OVERRIDE(OP): Performed by: INTERNAL MEDICINE

## 2025-06-08 PROCEDURE — 80048 BASIC METABOLIC PNL TOTAL CA: CPT

## 2025-06-08 PROCEDURE — 99233 SBSQ HOSP IP/OBS HIGH 50: CPT | Performed by: INTERNAL MEDICINE

## 2025-06-08 RX ADMIN — SENNOSIDES AND DOCUSATE SODIUM 2 TABLET: 50; 8.6 TABLET ORAL at 17:40

## 2025-06-08 RX ADMIN — CARBIDOPA AND LEVODOPA 1 TABLET: 25; 100 TABLET ORAL at 16:45

## 2025-06-08 RX ADMIN — CARBIDOPA AND LEVODOPA 1 TABLET: 25; 100 TABLET ORAL at 11:26

## 2025-06-08 RX ADMIN — ENOXAPARIN SODIUM 40 MG: 100 INJECTION SUBCUTANEOUS at 17:40

## 2025-06-08 RX ADMIN — VENLAFAXINE HYDROCHLORIDE 150 MG: 75 CAPSULE, EXTENDED RELEASE ORAL at 05:57

## 2025-06-08 RX ADMIN — LEVOTHYROXINE SODIUM 50 MCG: 0.05 TABLET ORAL at 05:57

## 2025-06-08 RX ADMIN — OMEPRAZOLE 20 MG: 20 CAPSULE, DELAYED RELEASE ORAL at 05:57

## 2025-06-08 RX ADMIN — CARBIDOPA AND LEVODOPA 1 TABLET: 25; 100 TABLET ORAL at 20:14

## 2025-06-08 ASSESSMENT — PAIN SCALES - PAIN ASSESSMENT IN ADVANCED DEMENTIA (PAINAD)
FACIALEXPRESSION: SMILING OR INEXPRESSIVE
CONSOLABILITY: NO NEED TO CONSOLE
BODYLANGUAGE: RELAXED
BODYLANGUAGE: RELAXED
BREATHING: NORMAL
CONSOLABILITY: NO NEED TO CONSOLE
TOTALSCORE: 0
FACIALEXPRESSION: SMILING OR INEXPRESSIVE
TOTALSCORE: 0
CONSOLABILITY: NO NEED TO CONSOLE
BREATHING: NORMAL
BODYLANGUAGE: RELAXED
BREATHING: NORMAL
TOTALSCORE: 0
FACIALEXPRESSION: SMILING OR INEXPRESSIVE

## 2025-06-08 ASSESSMENT — PATIENT HEALTH QUESTIONNAIRE - PHQ9
SUM OF ALL RESPONSES TO PHQ9 QUESTIONS 1 AND 2: 0
2. FEELING DOWN, DEPRESSED, IRRITABLE, OR HOPELESS: NOT AT ALL
1. LITTLE INTEREST OR PLEASURE IN DOING THINGS: NOT AT ALL

## 2025-06-08 ASSESSMENT — PAIN DESCRIPTION - PAIN TYPE
TYPE: SURGICAL PAIN
TYPE: ACUTE PAIN
TYPE: ACUTE PAIN

## 2025-06-08 NOTE — CARE PLAN
The patient is Watcher - Medium risk of patient condition declining or worsening    Shift Goals  Clinical Goals: Reorient pt. Q2T. Pain management. Monitor lab and V/S. Free from falls.  Patient Goals: Go home  Family Goals: Updatefor any POC    Progress made toward(s) clinical / shift goals:  Q2T provided, pt refused most of time. Pt seen awake and anxious during the rounds throughout the shift, hallucinated towards end the shift. V/S stable. No critical lab reported. No PRN pain mediation requested. Pt remains free from falls.     Patient is not progressing towards the following goals: No evidence of learning.       Problem: Knowledge Deficit - Standard  Goal: Patient and family/care givers will demonstrate understanding of plan of care, disease process/condition, diagnostic tests and medications  Outcome: Not Met

## 2025-06-08 NOTE — PROGRESS NOTES
"Hospital Medicine Daily Progress Note    Date of Service  6/8/2025    Chief Complaint  Kimmy Diaz is a 80 y.o. female admitted 6/4/2025 with fall    Hospital Course  Per chart review:  \"80 y.o. female with a past medical history of Parkinson's disease, and hypothyroidism who presented 6/4/2025 with hip pain after a fall.  Pain is severe rated 10/10.  The pain is worse with movement.  Patient tripped and fell as she was hiking on a trail.  She denies hitting her head or losing consciousness.\"     Interval Problem Update  6/5: Patient seen at bedside this morning.  Patient lying in bed, not complaining of overt pain with not moving.  Patient n.p.o. as she will undergo surgery today by orthopedic surgery.  We appreciate further recommendations.  Patient's son at bedside at the time of my evaluation today.    6/6: Patient seen at bedside this morning.  Patient lying in bed, currently not complaining of overt pain.  She seems to be more confused than yesterday.  As per the patient's  the patient's son patient with some cognitive decline in the last couple days.  This could be due to her Parkinson's.  May be some underlying dementia.  Will monitor closely.  On examination there is some blood on the dressings from surgery from yesterday however she seems to be hemodynamically stable.  Will continue with Lovenox for DVT prophylaxis for now and monitor closely.  We appreciate further recommendations by orthopedic surgery.    6/7: Patient seen at bedside this morning.  Patient lying in bed, currently not complaining of her pain.  She seems to be a little bit more alert than yesterday.  No family member present at the time of my evaluation.  I tried to call the patient's  over the phone however there was no answer.  Hemoglobin seems to be stable.  We are pending placement.  We appreciate further recommendation by orthopedic surgery.  Continue to monitor closely.    6/8: Patient seen at bedside this " morning.  The patient remains confused it seems that the patient has altered mental status that is waxing and waning which I suspect is due to her underlying cognitive disorder.  I also spoke to the son at bedside, he does not seem to be very concerned about this as she has similar behavior at home.  Monitor closely.  Pending placement.    I have discussed this patient's plan of care and discharge plan at IDT rounds today with Case Management, Nursing, Nursing leadership, and other members of the IDT team.    Consultants/Specialty  orthopedics    Code Status  Full Code    Disposition  The patient is not medically cleared for discharge to home or a post-acute facility.        Review of Systems  Review of Systems   Unable to perform ROS: Mental acuity        Physical Exam  Temp:  [36.2 °C (97.2 °F)-36.9 °C (98.4 °F)] 36.4 °C (97.6 °F)  Pulse:  [87-96] 94  Resp:  [17-18] 18  BP: (142-160)/(62-76) 148/62  SpO2:  [93 %-97 %] 97 %    Physical Exam  Vitals and nursing note reviewed.   Constitutional:       General: She is not in acute distress.     Appearance: She is ill-appearing.   HENT:      Head: Normocephalic and atraumatic.      Right Ear: External ear normal.      Left Ear: External ear normal.      Mouth/Throat:      Pharynx: No oropharyngeal exudate or posterior oropharyngeal erythema.   Eyes:      General:         Right eye: No discharge.         Left eye: No discharge.   Cardiovascular:      Rate and Rhythm: Normal rate and regular rhythm.      Pulses: Normal pulses.      Heart sounds: No murmur heard.     No gallop.   Pulmonary:      Effort: No respiratory distress.   Abdominal:      General: There is no distension.      Palpations: Abdomen is soft.      Tenderness: There is no abdominal tenderness.   Musculoskeletal:         General: Tenderness present.      Cervical back: Normal range of motion and neck supple. No tenderness.      Comments: Dressing in place   Skin:     General: Skin is warm and dry.    Neurological:      Mental Status: She is alert and oriented to person, place, and time.   Psychiatric:         Mood and Affect: Mood normal.         Behavior: Behavior normal.         Fluids    Intake/Output Summary (Last 24 hours) at 6/8/2025 1211  Last data filed at 6/8/2025 0900  Gross per 24 hour   Intake 360 ml   Output 300 ml   Net 60 ml        Laboratory  Recent Labs     06/06/25  0532 06/07/25  0142 06/07/25  1049 06/08/25  0608   WBC 10.7 10.6  --  10.4   RBC 3.59* 3.03*  --  2.74*   HEMOGLOBIN 10.9* 9.2* 9.3* 8.4*   HEMATOCRIT 33.9* 28.3* 28.1* 25.7*   MCV 94.4 93.4  --  93.8   MCH 30.4 30.4  --  30.7   MCHC 32.2 32.5  --  32.7   RDW 45.8 45.3  --  46.3   PLATELETCT 150* 134*  --  141*   MPV 11.7 11.7  --  11.3     Recent Labs     06/06/25  0532 06/07/25  0142 06/08/25  0608   SODIUM 137 138 140   POTASSIUM 4.2 3.9 3.6   CHLORIDE 100 102 104   CO2 26 24 26   GLUCOSE 114* 115* 105*   BUN 17 24* 22   CREATININE 0.69 0.64 0.54   CALCIUM 8.1* 8.3* 8.6                     Imaging  DX-HIP-UNILATERAL-WITH PELVIS-1 VIEW LEFT   Final Result      Digitized intraoperative radiograph is submitted for review. This examination is not for diagnostic purpose but for guidance during a surgical procedure. Please see the patient's chart for full procedural details.         INTERPRETING LOCATION: [address] [place] [address], [id number]      DX-PORTABLE FLUOROSCOPY < 1 HOUR Reason For Exam: Intra op/ non reportable   Final Result      Portable fluoroscopy utilized for 44 seconds.      INTERPRETING LOCATION: [address] [place] [address], [id number]      LQ-PGAD-CAJSYCAFDT (WITH 1-VIEW CXR) LEFT   Final Result      Normal rib series.      DX-FEMUR-2+ LEFT   Final Result      Left femoral neck fracture with impaction and angulation. There is also a component of fracture extending into the greater and lesser trochanters.      DX-PELVIS-1 OR 2 VIEWS   Final Result      Left femoral neck fracture with impaction and angulation. There is a component seen  "extending into the lesser and greater trochanters as well.           Assessment/Plan  * Left femoral neck fracture- (present on admission)  Assessment & Plan  S/p \"Open reduction internal fixation left intertrochanteric femur fracture with cephalomedullary implant\"    6/8: We appreciate further recommendation by orthopedic surgery.  Pending placement    Altered mental status  Assessment & Plan  Waxing and waning, I suspect underlying cognitive disorder due to the patient's history of Parkinson disease.  Also verified by family members that she has had cognitive decline at home as well.    Hyperglycemia  Assessment & Plan  Most likely reactive  monitor    Leukocytosis  Assessment & Plan  Most likely reactive  monitor    Thrombocytopenia (HCC)- (present on admission)  Assessment & Plan  Mild.  Seems to be chronic.  Monitor.    LUIS ALFREDO (generalized anxiety disorder)- (present on admission)  Assessment & Plan  6/8: continue home venlafaxine    Parkinson's disease (HCC)- (present on admission)  Assessment & Plan  6/8: continue home levodopa-carbidopa     Gastroesophageal reflux disease with esophagitis- (present on admission)  Assessment & Plan  omeprazole    Acquired hypothyroidism- (present on admission)  Assessment & Plan  continue levothyroxine    Advance care planning  Assessment & Plan  6/6: I discussed with the patient's  at bedside for at least 16 minutes further goals of care.  The patient's  was interested in filling out a POLST form.  The patient does not have capacity for decision making at this time.  I filled out a POLST form together with the patient's  at bedside.  For now the patient's  would like the patient to have CPR attempted, the patient has been would like the patient to have full treatment options including intubation, mechanical ventilation and transferred to the ICU as needed.  For now the patient's  would like the patient to have long-term artificial " nutrition/feeding tube.  The patient lacks decisional capacity.  The patient's  signed the POLST form.  I have given the POLST form to nursing to upload to the system.         VTE prophylaxis: Lovenox    I have performed a physical exam and reviewed and updated ROS and Plan today (6/8/2025). In review of yesterday's note (6/7/2025), there are no changes except as documented above.      I spend at least 52 minutes providing care for this patient.  This included face-to-face interview, physical examination.  Review of lab work including CBC, BMP.  Discussing with son plan of care.  Discussing with multidisciplinary team including case management, nursing staff and pharmacy.  Creating plan of care, reviewing orders.

## 2025-06-08 NOTE — ASSESSMENT & PLAN NOTE
Waxing and waning, I suspect underlying cognitive disorder due to the patient's history of Parkinson disease.  Also verified by family members that she has had cognitive decline at home as well.

## 2025-06-08 NOTE — PROGRESS NOTES
4 Eyes Skin Assessment Completed by CLARI Lan and CLARI Bell.    Skin assessment is primarily focused on high risk bony prominences. Pay special attention to skin beneath and around medical devices, high risk bony prominences, skin to skin areas and areas where the patient lacks sensation to feel pain and areas where the patient previously had breakdown.     Head (Occipital):  WDL   Ears (Under Medical Devices): Red and Blanching   Nose (Under Medical Devices): WDL   Mouth:  WDL   Neck: WDL   Breast/Chest:  WDL   Shoulder Blades:  WDL   Spine:   Red and Blanching   (R) Arm/Elbow/Hand: Red and Blanching   (L) Arm/Elbow/Hand: Skin Tear, Scab, Red, Blanching, and Bruising. Dressing in place    Abdomen: WDL   Pannus/Groin:  WDL   Sacrum/Coccyx:   Red and Blanching   (R) Ischial Tuberosity (Sit Bones):  WDL   (L) Ischial Tuberosity (Sit Bones):  WDL   (R) Leg:  WDL   (L) Leg:  Scab and Bruising, Incision L hip, dressings in place    (L) Heel: Red and Blanching. Mepilex in place   (R) Foot/Toe: WDL   (L) Heel: Red and Blanching Mepilex in place   (L) Foot/Toe:  WDL       DEVICES IN USE:   Respiratory Devices:  NA, patient on room air  Feeding Devices:  N/A   Lines & BP Monitoring Devices:  Pulse ox    Orthopedic Devices:  N/A  Miscellaneous Devices:  Purewick and SCDs    PROTOCOL INTERVENTIONS:   Low Airloss Bed:  Already in place  Offloading Dressing - Heel:  Already in place  Heel Float Boots:  Already in place  Q2 Turns with Pillows:  Already in place  Barrier Paste:  Already in place  Condom Cath/Purewick:  Already in place    WOUND PHOTOS:   N/A no wounds identified    WOUND CONSULT:   N/A, no advanced wound care needs identified

## 2025-06-08 NOTE — DISCHARGE PLANNING
Case Management Discharge Planning    Admission Date: 6/4/2025  GMLOS: 3.1  ALOS: 4    6-Clicks ADL Score: 12  6-Clicks Mobility Score: 11  PT and/or OT Eval ordered: Yes  Post-acute Referrals Ordered: Yes  Post-acute Choice Obtained: Yes  Has referral(s) been sent to post-acute provider:  Yes      Anticipated Discharge Dispo: Discharge Disposition: D/T to SNF with Medicare cert in anticipation of skilled care (03)    DME Needed: No    Action(s) Taken: Choice obtained for SNF [facility] faxed to [facility]. Message was left with [facility] yesterday no CB today from [facility] admissions office is closed.    Escalations Completed: None    Medically Clear: No    Next Steps: LMSW to follow for needs and barriers to discharge.    Barriers to Discharge: Medical clearance pending placement

## 2025-06-08 NOTE — PROGRESS NOTES
4 Eyes Skin Assessment Completed by CLARI Alves and CLARI Gill.    Skin assessment is primarily focused on high risk bony prominences. Pay special attention to skin beneath and around medical devices, high risk bony prominences, skin to skin areas and areas where the patient lacks sensation to feel pain and areas where the patient previously had breakdown.     Head (Occipital):  WDL   Ears (Under Medical Devices): Redness banching   Nose (Under Medical Devices): WDL   Mouth:  WDL   Neck: WDL   Breast/Chest:  Red and Blanching   Shoulder Blades:  WDL   Spine:   WDL   (R) Arm/Elbow/Hand: Red and Blanching   (L) Arm/Elbow/Hand: Skin Tear, Scab, Red, Blanching, and Bruising   Abdomen: WDL   Pannus/Groin:  WDL   Sacrum/Coccyx:   Red and Blanching   (R) Ischial Tuberosity (Sit Bones):  WDL   (L) Ischial Tuberosity (Sit Bones):  WDL   (R) Leg:  WDL   (L) Leg:  Scab, Bruising, and Incision   (R) Heel:  Red and Blanching   (R) Foot/Toe: WDL   (L) Heel: Red and Blanching   (L) Foot/Toe:  WDL       DEVICES IN USE:   Respiratory Devices:  NA, patient on room air and Pulse ox  Feeding Devices:  N/A   Lines & BP Monitoring Devices:  Peripheral IV, BP cuff, and Pulse ox    Orthopedic Devices:  N/A  Miscellaneous Devices:  Purewick and SCDs    PROTOCOL INTERVENTIONS:   Low Airloss Bed:  Already in place  Offloading Dressing - Sacrum:  Already in place  Q2 Turns with Pillows:  Already in place    WOUND PHOTOS:   N/A no wounds identified    WOUND CONSULT:   Wound team already following area(s) of concern left arm

## 2025-06-08 NOTE — PROGRESS NOTES
BSSR received from night RN. Pt sleeping easily wakes to voice no any signs of distress or discomfort on RA at 94%. Oriented only to self, unable to answer questions. Reoriented. L leg dressings in place, old dry drainage noted. Discussed POC. Fall risk precautions in place, locked bed in lowest position, bed alarm on and call light within reach. All needs met at this time. Hourly rounding in place.

## 2025-06-08 NOTE — PROGRESS NOTES
Late entry   1918 Received report from day shift RN. Patient is awake and alert, resting in bed. A&O X1, room air. Unlabored respiration. Surgical dressing to left hip old/new drainage, dry and intact. Care plan discussed including Q2T and monitoring lab and V/S. Call light within reach. Personal belongings within reach. No needs required at this time. Safety precautions in place.     0000 Pt anxious and tried to walk out of room. Sitting at edge of bed for 20 minutes.     0047 Pt refused blood draw. Rescheduled to 0400.    0402 Pt refused blood draw. Charge RN aware of it.  Reschedule to 0600.     0559 Pt lab draw complete. Pt seen awake during the rounds through out the shift. Pt getting calm but hallucinated ( seeing a girl in the room) toward the end of shift.

## 2025-06-09 PROBLEM — R33.9 URINARY RETENTION: Status: ACTIVE | Noted: 2025-06-09

## 2025-06-09 LAB
HCT VFR BLD AUTO: 24.9 % (ref 37–47)
HCT VFR BLD AUTO: 27.7 % (ref 37–47)
HGB BLD-MCNC: 8.1 G/DL (ref 12–16)
HGB BLD-MCNC: 9.2 G/DL (ref 12–16)

## 2025-06-09 PROCEDURE — 51798 US URINE CAPACITY MEASURE: CPT

## 2025-06-09 PROCEDURE — A9270 NON-COVERED ITEM OR SERVICE: HCPCS | Performed by: INTERNAL MEDICINE

## 2025-06-09 PROCEDURE — 99233 SBSQ HOSP IP/OBS HIGH 50: CPT | Performed by: INTERNAL MEDICINE

## 2025-06-09 PROCEDURE — 94760 N-INVAS EAR/PLS OXIMETRY 1: CPT

## 2025-06-09 PROCEDURE — 97530 THERAPEUTIC ACTIVITIES: CPT

## 2025-06-09 PROCEDURE — 700111 HCHG RX REV CODE 636 W/ 250 OVERRIDE (IP): Mod: JZ | Performed by: INTERNAL MEDICINE

## 2025-06-09 PROCEDURE — 36415 COLL VENOUS BLD VENIPUNCTURE: CPT

## 2025-06-09 PROCEDURE — 700102 HCHG RX REV CODE 250 W/ 637 OVERRIDE(OP): Performed by: HOSPITALIST

## 2025-06-09 PROCEDURE — 770001 HCHG ROOM/CARE - MED/SURG/GYN PRIV*

## 2025-06-09 PROCEDURE — A9270 NON-COVERED ITEM OR SERVICE: HCPCS | Performed by: HOSPITALIST

## 2025-06-09 PROCEDURE — 85018 HEMOGLOBIN: CPT | Mod: 91

## 2025-06-09 PROCEDURE — 97535 SELF CARE MNGMENT TRAINING: CPT

## 2025-06-09 PROCEDURE — 85014 HEMATOCRIT: CPT

## 2025-06-09 PROCEDURE — 700102 HCHG RX REV CODE 250 W/ 637 OVERRIDE(OP): Performed by: INTERNAL MEDICINE

## 2025-06-09 RX ADMIN — OMEPRAZOLE 20 MG: 20 CAPSULE, DELAYED RELEASE ORAL at 06:17

## 2025-06-09 RX ADMIN — LEVOTHYROXINE SODIUM 50 MCG: 0.05 TABLET ORAL at 06:17

## 2025-06-09 RX ADMIN — ENOXAPARIN SODIUM 40 MG: 100 INJECTION SUBCUTANEOUS at 17:35

## 2025-06-09 RX ADMIN — VENLAFAXINE HYDROCHLORIDE 150 MG: 75 CAPSULE, EXTENDED RELEASE ORAL at 06:17

## 2025-06-09 RX ADMIN — ACETAMINOPHEN 650 MG: 325 TABLET ORAL at 12:35

## 2025-06-09 RX ADMIN — SENNOSIDES AND DOCUSATE SODIUM 2 TABLET: 50; 8.6 TABLET ORAL at 17:35

## 2025-06-09 RX ADMIN — CARBIDOPA AND LEVODOPA 1 TABLET: 25; 100 TABLET ORAL at 21:29

## 2025-06-09 RX ADMIN — CARBIDOPA AND LEVODOPA 1 TABLET: 25; 100 TABLET ORAL at 10:20

## 2025-06-09 RX ADMIN — CARBIDOPA AND LEVODOPA 1 TABLET: 25; 100 TABLET ORAL at 15:17

## 2025-06-09 ASSESSMENT — PAIN SCALES - PAIN ASSESSMENT IN ADVANCED DEMENTIA (PAINAD)
CONSOLABILITY: NO NEED TO CONSOLE
BREATHING: NORMAL
BREATHING: NORMAL
TOTALSCORE: 0
TOTALSCORE: 0
CONSOLABILITY: NO NEED TO CONSOLE
BODYLANGUAGE: RELAXED
BODYLANGUAGE: RELAXED
FACIALEXPRESSION: SMILING OR INEXPRESSIVE
FACIALEXPRESSION: SMILING OR INEXPRESSIVE

## 2025-06-09 ASSESSMENT — PAIN DESCRIPTION - PAIN TYPE
TYPE: ACUTE PAIN
TYPE: ACUTE PAIN

## 2025-06-09 ASSESSMENT — GAIT ASSESSMENTS
GAIT LEVEL OF ASSIST: UNABLE TO PARTICIPATE
DISTANCE (FEET): 3

## 2025-06-09 ASSESSMENT — COGNITIVE AND FUNCTIONAL STATUS - GENERAL
DRESSING REGULAR UPPER BODY CLOTHING: A LOT
DRESSING REGULAR LOWER BODY CLOTHING: TOTAL
SUGGESTED CMS G CODE MODIFIER MOBILITY: CL
TURNING FROM BACK TO SIDE WHILE IN FLAT BAD: A LITTLE
CLIMB 3 TO 5 STEPS WITH RAILING: TOTAL
EATING MEALS: A LITTLE
MOVING TO AND FROM BED TO CHAIR: A LOT
MOBILITY SCORE: 11
TOILETING: TOTAL
STANDING UP FROM CHAIR USING ARMS: A LOT
MOVING FROM LYING ON BACK TO SITTING ON SIDE OF FLAT BED: A LOT
DAILY ACTIVITIY SCORE: 12
SUGGESTED CMS G CODE MODIFIER DAILY ACTIVITY: CL
HELP NEEDED FOR BATHING: A LOT
PERSONAL GROOMING: A LITTLE
WALKING IN HOSPITAL ROOM: TOTAL

## 2025-06-09 ASSESSMENT — FIBROSIS 4 INDEX: FIB4 SCORE: 9.71

## 2025-06-09 NOTE — DISCHARGE PLANNING
Case Management Discharge Planning    Admission Date: 6/4/2025  GMLOS: 3.1  ALOS: 5    6-Clicks ADL Score: 12  6-Clicks Mobility Score: 11  PT and/or OT Eval ordered: Yes  Post-acute Referrals Ordered: Yes  Post-acute Choice Obtained: Yes  Has referral(s) been sent to post-acute provider:  Yes      Anticipated Discharge Dispo: Discharge Disposition: D/T to SNF with Medicare cert in anticipation of skilled care (03)    DME Needed: No    Action(s) Taken: LSW spoke with Christal at [facility] who stated they would be able to accept pt tomorrow. Pending confirmation of medical clearance.    Addendum @3978  Transportation request sent to [employer] for 1400  tomorrow going to [facility]. LSW made phone call to pt's spouse, Alonso, at both numbers listed. No answer, left VM with callback information.    Escalations Completed: None    Medically Clear: No    Next Steps: Care coordination will f/u with [facility] pending confirmation of medical clearance    Barriers to Discharge: Medical clearance and Pending Placement    Is the patient up for discharge tomorrow: Yes    Is transport arranged for discharge disposition: Yes

## 2025-06-09 NOTE — ASSESSMENT & PLAN NOTE
I have placed order for elmore catheter  Discussed with Dr Fernando Morris, and they will follow up as outpatient

## 2025-06-09 NOTE — CARE PLAN
The patient is Stable - Low risk of patient condition declining or worsening    Shift Goals  Clinical Goals: Pt's pain will be controlled, q2 turns, monior UO and free from falls during this shift  Patient Goals: Rest  Family Goals: n/a    Progress made toward(s) clinical / shift goals:  Pt reported pain controlled. Q2 repositioning. Ambulated up to BSC and chair. Bladder scan 216. Tolerated diet, denies nausea. Pt did not sustain any fall during this shift.     Patient is not progressing towards the following goals:

## 2025-06-09 NOTE — THERAPY
Occupational Therapy  Daily Treatment     Patient Name:  Kimmy Diaz  Age:  80 y.o., Sex:  female  Medical Record #:  [id number]  Today's Date:  6/9/2025    Precautions  Medical: (P) Fall Risk  Weight Bearing: (P) Weight Bearing as Tolerated Left Lower Extremity    Assessment    Pt demonstrated Mod assist bed mobility, Mod assist sit/stand, Min assist BSC and EOB transfers, Total assist toileting, Total assist LBCM, Mod assist UBCM.Min assist eating, Min assist G&H seated w/ set up.  Barriers include weakness, impaired balance, limited activity tolerance.  Therapist reviewed environmental/safety awareness, fall precautions, AE/DME, ADL's and transfers.    Plan    Treatment Plan Status: (P) Continue Current Treatment Plan  Type of Treatment: Self Care / Activities of Daily Living, Neuro Re-Education / Balance, Therapeutic Activity  Treatment Frequency: 4 Times per Week  Treatment Duration: Until Therapy Goals Met    DC Equipment Recommendations: (P) Unable to determine at this time  Discharge Recommendations: (P) Recommend post-acute placement for additional occupational therapy services prior to discharge home    Subjective    Pt was alert and cooperative w/ tx.     Objective       06/09/25 1415    Services   Is patient using  services for this encounter? No   Precautions   Medical Fall Risk   Weight Bearing Weight Bearing as Tolerated Left Lower Extremity   Vitals   Pulse Oximetry 95 %   O2 (LPM) 0   O2 Delivery Device None - Room Air   Pain   Intervention Cold Pack;Rest;Repositioned   Pain 0 - 10 Group   Location Hip   Location Orientation Left   Description Aching   Comfort Goal Comfort with Movement;Perform Activity   Non Verbal Descriptors   Non Verbal Scale  Calm;Unlabored Breathing   Cognition    Speech/ Communication Delayed Responses   Level of Consciousness Alert   Ability To Follow Commands 1 Step   Attention Impaired   Balance   Sitting Balance (Static) Fair   Sitting Balance  (Dynamic) Fair -   Standing Balance (Static) Trace +   Standing Balance (Dynamic) Trace   Weight Shift Sitting Fair   Weight Shift Standing Poor   Skilled Intervention Verbal Cuing;Tactile Cuing;Sequencing;Postural Facilitation;Facilitation   Bed Mobility    Supine to Sit Moderate Assist   Sit to Supine Moderate Assist   Scooting Minimal Assist   Skilled Intervention Verbal Cuing;Tactile Cuing;Sequencing;Postural Facilitation;Facilitation   Activities of Daily Living   Eating Minimal Assist   Grooming Minimal Assist;Seated   Upper Body Dressing Moderate Assist   Lower Body Dressing Total Assist   Toileting Total Assist   How much help from another person does the patient currently need...   Putting on and taking off regular lower body clothing? 1   Bathing (including washing, rinsing, and drying)? 2   Toileting, which includes using a toilet, bedpan, or urinal? 1   Putting on and taking off regular upper body clothing? 2   Taking care of personal grooming such as brushing teeth? 3   Eating meals? 3   6 Clicks Daily Activity Score 12   Functional Mobility   Sit to Stand Moderate Assist   Bed, Chair, Wheelchair Transfer Minimal Assist   Toilet Transfers Minimal Assist  (BSC)   Transfer Method Stand Step   Short Term Goals   Short Term Goal # 1 Pt will perform feeding with set-up, Spv within 5  days   Goal Outcome # 1 Progressing as expected   Short Term Goal # 2 Pt will perform ADL transfers with CGA within 5 days   Goal Outcome # 2 Progressing slower than expected   Short Term Goal # 3 Pt will perform seated grooming with SBA within 5 days   Goal Outcome # 3 Progressing as expected   Short Term Goal # 4 Pt will perform toileting at BSC with CGA within 5 days   Goal Outcome # 4 Progressing as expected   Occupational Therapy Treatment Plan    O.T. Treatment Plan Continue Current Treatment Plan   Anticipated Discharge Equipment and Recommendations   DC Equipment Recommendations Unable to determine at this time    Discharge Recommendations Recommend post-acute placement for additional occupational therapy services prior to discharge home

## 2025-06-09 NOTE — PROGRESS NOTES
1920 Received report from day shift RN. Patient is awake and alert, resting in chair. A&O X2. Unlabored respiration. Surgical dressing to eft hip with old drainage, dry and intact. Family at bedside. Care plan discussed including pain management and Q2T. . Call light within reach. Personal belongings within reach. No needs required at this time. Safety precautions in place.

## 2025-06-09 NOTE — PROGRESS NOTES
4 Eyes Skin Assessment Completed by CLARI Alves and CLARI Mckeon.    Skin assessment is primarily focused on high risk bony prominences. Pay special attention to skin beneath and around medical devices, high risk bony prominences, skin to skin areas and areas where the patient lacks sensation to feel pain and areas where the patient previously had breakdown.     Head (Occipital):  WDL   Ears (Under Medical Devices): WDL, Red, and Blanching   Nose (Under Medical Devices): WDL   Mouth:  WDL   Neck: WDL   Breast/Chest:  WDL   Shoulder Blades:  WDL   Spine:   Red and Blanching   (R) Arm/Elbow/Hand: Red and Blanching   (L) Arm/Elbow/Hand: Skin Tear, Scab, Red, and Bruising   Abdomen: WDL   Pannus/Groin:  WDL   Sacrum/Coccyx:   Red, Blanching, Edema, and Weeping   (R) Ischial Tuberosity (Sit Bones):  Red and Blanching   (L) Ischial Tuberosity (Sit Bones):  Red and Blanching   (R) Leg:  WDL   (L) Leg:  Scab, Red, Blanching, Bruising, and Incision   (R) Heel:  Red, Blanching, and Boggy   (R) Foot/Toe: WDL   (L) Heel: Red, Blanching, and Boggy   (L) Foot/Toe:  WDL       DEVICES IN USE:   Respiratory Devices:  NA, patient on room air, Nasal cannula, and Pulse ox  Feeding Devices:  N/A   Lines & BP Monitoring Devices:  Peripheral IV, BP cuff, and Pulse ox    Orthopedic Devices:  N/A  Miscellaneous Devices:  SCDs    PROTOCOL INTERVENTIONS:   Standard/Trauma Bed:  Already in place  Offloading Dressing - Sacrum:  Already in place  Offloading Dressing - Heel:  Already in place  Float Heels with Pillows:  Already in place  Q2 Turns with Pillows:  Already in place  Barrier Paste:  Already in place    WOUND PHOTOS:   N/A no wounds identified    WOUND CONSULT:   N/A, no advanced wound care needs identified

## 2025-06-09 NOTE — THERAPY
Physical Therapy   Daily Treatment     Patient Name:  Kimmy Diaz  Age:  80 y.o., Sex:  female  Medical Record #:  [id number]  Today's Date: 6/9/2025     Precautions  Medical: (P) Fall Risk  Weight Bearing: (P) Weight Bearing as Tolerated Left Lower Extremity    Assessment  Pt tolerated tx fair today. Patient seen for team treatment with Occupational Therapist due to   Patient required 2 person assistance for safety and to provide effective interventions. Each discipline assisted patient with appropriate and separate goals. SPT facilitated bed mobility and sit<>stand transfers. while Occupational Therapist simultaneously treated pt according to POC. Pt is limited in activity tolerance by pain in L LE and difficulty WB through R LE. Pt was able to completed bed mobility w/ Julisa x2 and performed transfers w/ ModAx1. Pt had a difficult time w/ sequencing movements. Will continue to see while in house. Still recommending post-acute placement upon d/c.      Plan  Treatment Plan Status: (P) Continue Current Treatment Plan  Type of Treatment: Bed Mobility, Equipment, Gait Training, Manual Therapy, Neuro Re-Education / Balance, Self Care / Home Evaluation, Stair Training, Therapeutic Activities, Therapeutic Exercise  Treatment Frequency: 4 Times per Week  Treatment Duration: (P) Until Therapy Goals Met    DC Equipment Recommendations: (P) Unable to determine at this time  Discharge Recommendations: (P) Recommend post-acute placement for additional physical therapy services prior to discharge home       Objective   06/09/25 1410   Precautions   Medical Fall Risk   Weight Bearing Weight Bearing as Tolerated Left Lower Extremity   Pain 0 - 10 Group   Location Hip   Location Orientation Left   Therapist Pain Assessment Prior to Activity;During Activity;Post Activity;Nurse Notified  (pt wincing in pain w/ movement but reports 0/10 pain)   Cognition    Cognition / Consciousness X   Speech/ Communication Delayed Responses    Orientation Level Not Oriented to Reason;Not Oriented to Place   Level of Consciousness Alert   Ability To Follow Commands 1 Step   Attention Impaired   Comments pt was pleasantly confused and thought she was in surgery upon PT arrival - pt's attention impaired sharing information that doesn't pertain to questions being asked   Passive ROM Lower Body   Passive ROM Lower Body X   Comments limited L LE due to postop pain - pt demonstrates tremors in R LE   Active ROM Lower Body    Active ROM Lower Body  X   Comments limited L LE due to postop pain - pt demonstrates tremors in R LE   Strength Lower Body   Lower Body Strength  X   Comments pt able to assist in some WB through stand pivot transfers, still having difficulty WB functionally   Sensation Lower Body   Lower Extremity Sensation   WDL   Lower Body Muscle Tone   Lower Body Muscle Tone  WDL   Supine Lower Body Exercise   Supine Lower Body Exercises No   Sitting Lower Body Exercises   Sitting Lower Body Exercises No   Standing Lower Body Exercises   Standing Lower Body Exercises No   Vision   Vision Comments WNL   Neurological Concerns   Comments hx of Parkinsons - tremors present   Balance   Sitting Balance (Static) Fair   Sitting Balance (Dynamic) Fair -   Standing Balance (Static) Trace +   Standing Balance (Dynamic) Trace   Weight Shift Sitting Fair   Weight Shift Standing Poor   Skilled Intervention Verbal Cuing;Tactile Cuing   Bed Mobility    Supine to Sit Moderate Assist   Sit to Supine Moderate Assist   Scooting Minimal Assist   Skilled Intervention Tactile Cuing;Verbal Cuing   Gait Analysis   Gait Level Of Assist Unable to Participate   Distance (Feet) 3   # of Times Distance was Traveled 3   Weight Bearing Status WBAT L LE   Skilled Intervention Tactile Cuing;Verbal Cuing;Sequencing   Functional Mobility   Sit to Stand Moderate Assist   Bed, Chair, Wheelchair Transfer Minimal Assist   Toilet Transfers Minimal Assist   Transfer Method Stand Step    Mobility supine, EOB, sit<>stand, BSC, transfer to chair, transfer back to bed   Skilled Intervention Sequencing   6 Clicks Assessment - How much HELP from from another person do you currently need... (If the patient hasn't done an activity recently, how much help from another person do you think he/she would need if he/she tried?)   Turning from your back to your side while in a flat bed without using bedrails? 3   Moving from lying on your back to sitting on the side of a flat bed without using bedrails? 2   Moving to and from a bed to a chair (including a wheelchair)? 2   Standing up from a chair using your arms (e.g., wheelchair, or bedside chair)? 2   Walking in hospital room? 1   Climbing 3-5 steps with a railing? 1   6 clicks Mobility Score 11   Activity Tolerance   Sitting in Chair 5 mins   Sitting Edge of Bed 3 mins   Standing 2 mins x 2   Patient / Family Goals    Patient / Family Goal #1 pt and spouse want her to return home and work towards improving LOF   Short Term Goals    Short Term Goal # 1 in 6tx, pt will be Julisa w/ bed mobility to demo improved functional mobility   Goal Outcome # 1 Progressing as expected   Short Term Goal # 2 in 6tx, pt will be SBA w/ sit<>stand transfers to demonstrate improved LE strength//function   Goal Outcome # 2 Progressing slower than expected   Short Term Goal # 3 in 6tx, pt will ambulate 5 steps w/ FWW and Julisa to show improved ability to utilize BSC   Goal Outcome # 3 Progressing slower than expected   Education Group   Education Provided Role of Physical Therapist   Role of Physical Therapist Patient Response Patient;Significant Other;Acceptance;Explanation;Verbal Demonstration   Physical Therapy Treatment Plan   Physical Therapy Treatment Plan Continue Current Treatment Plan   Duration Until Therapy Goals Met   Anticipated Discharge Equipment and Recommendations   DC Equipment Recommendations Unable to determine at this time   Discharge Recommendations Recommend  post-acute placement for additional physical therapy services prior to discharge home   Interdisciplinary Plan of Care Collaboration   IDT Collaboration with  Nursing;Occupational Therapist   Patient Position at End of Therapy In Bed;Bed Alarm On;Call Light within Reach;Tray Table within Reach;Phone within Reach   Collaboration Comments RN aware of visit and recs   Session Information   Date / Session Number  6/9 - 2 (2/4, 6/12)

## 2025-06-09 NOTE — PROGRESS NOTES
"Hospital Medicine Daily Progress Note    Date of Service  6/9/2025    Chief Complaint  Kimmy Diaz is a 80 y.o. female admitted 6/4/2025 with fall    Hospital Course  Per chart review:  \"80 y.o. female with a past medical history of Parkinson's disease, and hypothyroidism who presented 6/4/2025 with hip pain after a fall.  Pain is severe rated 10/10.  The pain is worse with movement.  Patient tripped and fell as she was hiking on a trail.  She denies hitting her head or losing consciousness.\"     Interval Problem Update  6/5: Patient seen at bedside this morning.  Patient lying in bed, not complaining of overt pain with not moving.  Patient n.p.o. as she will undergo surgery today by orthopedic surgery.  We appreciate further recommendations.  Patient's son at bedside at the time of my evaluation today.    6/6: Patient seen at bedside this morning.  Patient lying in bed, currently not complaining of overt pain.  She seems to be more confused than yesterday.  As per the patient's  the patient's son patient with some cognitive decline in the last couple days.  This could be due to her Parkinson's.  May be some underlying dementia.  Will monitor closely.  On examination there is some blood on the dressings from surgery from yesterday however she seems to be hemodynamically stable.  Will continue with Lovenox for DVT prophylaxis for now and monitor closely.  We appreciate further recommendations by orthopedic surgery.    6/7: Patient seen at bedside this morning.  Patient lying in bed, currently not complaining of her pain.  She seems to be a little bit more alert than yesterday.  No family member present at the time of my evaluation.  I tried to call the patient's  over the phone however there was no answer.  Hemoglobin seems to be stable.  We are pending placement.  We appreciate further recommendation by orthopedic surgery.  Continue to monitor closely.    6/8: Patient seen at bedside this " morning.  The patient remains confused it seems that the patient has altered mental status that is waxing and waning which I suspect is due to her underlying cognitive disorder.  I also spoke to the son at bedside, he does not seem to be very concerned about this as she has similar behavior at home.  Monitor closely.  Pending placement.    6/9: Patient seen at bedside this morning.  Patient remains confused however somewhat more alert than yesterday.  I still suspect this is waxing and waning.  No family member present at the time of my evaluation.  Pending placement.  We appreciate further recommendations by case management.  Mckeon catheter ordered due to urinary retention, I spoke to Dr. Morris they will follow-up with the patient as an outpatient.    I have discussed this patient's plan of care and discharge plan at IDT rounds today with Case Management, Nursing, Nursing leadership, and other members of the IDT team.    Consultants/Specialty  orthopedics  Urology - Dr Morris    Code Status  Full Code    Disposition  The patient is not medically cleared for discharge to home or a post-acute facility.        Review of Systems  Review of Systems   Unable to perform ROS: Mental acuity        Physical Exam  Temp:  [36.2 °C (97.2 °F)-36.7 °C (98.1 °F)] 36.2 °C (97.2 °F)  Pulse:  [82-92] 92  Resp:  [17-18] 17  BP: (132-155)/(57-75) 155/75  SpO2:  [94 %-95 %] 94 %    Physical Exam  Vitals and nursing note reviewed.   Constitutional:       General: She is not in acute distress.     Appearance: She is ill-appearing.   HENT:      Head: Normocephalic and atraumatic.      Right Ear: External ear normal.      Left Ear: External ear normal.      Mouth/Throat:      Pharynx: No oropharyngeal exudate or posterior oropharyngeal erythema.   Eyes:      General:         Right eye: No discharge.         Left eye: No discharge.   Cardiovascular:      Rate and Rhythm: Normal rate and regular rhythm.      Pulses: Normal pulses.      Heart  sounds: No murmur heard.     No gallop.   Pulmonary:      Effort: No respiratory distress.   Abdominal:      General: There is no distension.      Palpations: Abdomen is soft.      Tenderness: There is no abdominal tenderness.   Musculoskeletal:         General: Tenderness present.      Cervical back: Normal range of motion and neck supple. No tenderness.      Comments: Dressing in place   Skin:     General: Skin is warm and dry.   Neurological:      Mental Status: She is alert and oriented to person, place, and time.   Psychiatric:         Mood and Affect: Mood normal.         Behavior: Behavior normal.         Fluids    Intake/Output Summary (Last 24 hours) at 6/9/2025 1241  Last data filed at 6/9/2025 1100  Gross per 24 hour   Intake 580 ml   Output 1150 ml   Net -570 ml        Laboratory  Recent Labs     06/07/25  0142 06/07/25  1049 06/08/25  0608 06/09/25  0116 06/09/25  0845   WBC 10.6  --  10.4  --   --    RBC 3.03*  --  2.74*  --   --    HEMOGLOBIN 9.2*   < > 8.4* 8.1* 9.2*   HEMATOCRIT 28.3*   < > 25.7* 24.9* 27.7*   MCV 93.4  --  93.8  --   --    MCH 30.4  --  30.7  --   --    MCHC 32.5  --  32.7  --   --    RDW 45.3  --  46.3  --   --    PLATELETCT 134*  --  141*  --   --    MPV 11.7  --  11.3  --   --     < > = values in this interval not displayed.     Recent Labs     06/07/25  0142 06/08/25  0608   SODIUM 138 140   POTASSIUM 3.9 3.6   CHLORIDE 102 104   CO2 24 26   GLUCOSE 115* 105*   BUN 24* 22   CREATININE 0.64 0.54   CALCIUM 8.3* 8.6                     Imaging  DX-HIP-UNILATERAL-WITH PELVIS-1 VIEW LEFT   Final Result      Digitized intraoperative radiograph is submitted for review. This examination is not for diagnostic purpose but for guidance during a surgical procedure. Please see the patient's chart for full procedural details.         INTERPRETING LOCATION: [address] [place] [address], [facility]      DX-PORTABLE FLUOROSCOPY < 1 HOUR Reason For Exam: Intra op/ non reportable   Final Result      Portable  "fluoroscopy utilized for 44 seconds.      INTERPRETING LOCATION: [address] [facility], [place], [address]      QM-[id number]-[id number] (WITH 1-VIEW CXR) LEFT   Final Result      Normal rib series.      DX-FEMUR-2+ LEFT   Final Result      Left femoral neck fracture with impaction and angulation. There is also a component of fracture extending into the greater and lesser trochanters.      DX-PELVIS-1 OR 2 VIEWS   Final Result      Left femoral neck fracture with impaction and angulation. There is a component seen extending into the lesser and greater trochanters as well.           Assessment/Plan  * Left femoral neck fracture- (present on admission)  Assessment & Plan  S/p \"Open reduction internal fixation left intertrochanteric femur fracture with cephalomedullary implant\"    6/9: We appreciate further recommendation by orthopedic surgery.  Pending placement    Urinary retention  Assessment & Plan  I have placed order for elmore catheter  Discussed with Dr Fernando Morris, and they will follow up as outpatient    Altered mental status  Assessment & Plan  Waxing and waning, I suspect underlying cognitive disorder due to the patient's history of Parkinson disease.  Also verified by family members that she has had cognitive decline at home as well.    Hyperglycemia  Assessment & Plan  Most likely reactive  Monitor    --A1c is 5.4    Leukocytosis  Assessment & Plan  Most likely reactive  monitor    Thrombocytopenia (HCC)- (present on admission)  Assessment & Plan  Mild.  Seems to be chronic.  Monitor.    LUIS ALFREDO (generalized anxiety disorder)- (present on admission)  Assessment & Plan  6/9: continue home venlafaxine    Parkinson's disease (HCC)- (present on admission)  Assessment & Plan  6/9: continue home levodopa-carbidopa     Gastroesophageal reflux disease with esophagitis- (present on admission)  Assessment & Plan  omeprazole    Acquired hypothyroidism- (present on admission)  Assessment & Plan  6/9: continue levothyroxine    Advance care " planning  Assessment & Plan  6/6: I discussed with the patient's  at bedside for at least 16 minutes further goals of care.  The patient's  was interested in filling out a POLST form.  The patient does not have capacity for decision making at this time.  I filled out a POLST form together with the patient's  at bedside.  For now the patient's  would like the patient to have CPR attempted, the patient has been would like the patient to have full treatment options including intubation, mechanical ventilation and transferred to the ICU as needed.  For now the patient's  would like the patient to have long-term artificial nutrition/feeding tube.  The patient lacks decisional capacity.  The patient's  signed the POLST form.  I have given the POLST form to nursing to upload to the system.         VTE prophylaxis: Lovenox    I have performed a physical exam and reviewed and updated ROS and Plan today (6/9/2025). In review of yesterday's note (6/8/2025), there are no changes except as documented above.      I spend at least 51 minutes providing care for this patient.  This included face-to-face interview, physical examination.  Review of lab work including Hb.  Discussing with son plan of care.  Discussing with multidisciplinary team including case management, nursing staff and pharmacy.  Creating plan of care, reviewing orders.

## 2025-06-09 NOTE — PROGRESS NOTES
0700 Received report from Night shift nurse  0836 Performed assessment  Pt is A&Ox0- pt would not respond and occasionally would nod appropriately, no overt s/s of pain, Updated on POC: mobilize to chair, manage pain, place elmore  Call light within reach, hourly rounding in place, bed in lowest position, bed/strip alarm on.

## 2025-06-09 NOTE — CARE PLAN
The patient is Stable - Low risk of patient condition declining or worsening    Shift Goals  Clinical Goals: Pain management. Miantain skin integrity. monitor lab and v/s. emotional support. free from falls.  Patient Goals: rest  Family Goals: n/a    Progress made toward(s) clinical / shift goals:  Pt ambulated to bathroom by Zeenat Worley. Skin integrity maintained by skin check and Q2T. Emotional supported by spending time with pt and listening to pt. V/S stable. No critical lab reported.  Pt seen sleeping intermittently during the rounds. A&O x2, self and situation. No hallucination observed. Pt remains free from falls during the shift.    Patient is not progressing towards the following goals: No evidence of learning on knowledge about post-op care.

## 2025-06-09 NOTE — DISCHARGE PLANNING
DC Transport Scheduled    Transport Company Scheduled:  [employer]  Spoke with Emma at [facility] to schedule transport.    Scheduled Date: 6/10/2025  Scheduled Time: 1400    Transport Type: [facility]  [facility]   Destination address: [address] [place] [id number]    Notified care team of scheduled transport via [employer].     If there are any changes needed to the DC transportation scheduled, please contact [facility] at ext. [phone] between the hours of 1613-6336. If outside those hours, contact the ED Case Manager at ext. [phone].

## 2025-06-10 ENCOUNTER — PATIENT OUTREACH (OUTPATIENT)
Dept: SCHEDULING | Facility: IMAGING CENTER | Age: 80
End: 2025-06-10
Payer: MEDICARE

## 2025-06-10 VITALS
HEIGHT: 60 IN | BODY MASS INDEX: 21.3 KG/M2 | TEMPERATURE: 99 F | HEART RATE: 71 BPM | OXYGEN SATURATION: 93 % | DIASTOLIC BLOOD PRESSURE: 80 MMHG | WEIGHT: 108.47 LBS | RESPIRATION RATE: 18 BRPM | SYSTOLIC BLOOD PRESSURE: 138 MMHG

## 2025-06-10 PROCEDURE — A9270 NON-COVERED ITEM OR SERVICE: HCPCS | Performed by: HOSPITALIST

## 2025-06-10 PROCEDURE — 94760 N-INVAS EAR/PLS OXIMETRY 1: CPT

## 2025-06-10 PROCEDURE — A9270 NON-COVERED ITEM OR SERVICE: HCPCS | Performed by: INTERNAL MEDICINE

## 2025-06-10 PROCEDURE — 700102 HCHG RX REV CODE 250 W/ 637 OVERRIDE(OP): Performed by: HOSPITALIST

## 2025-06-10 PROCEDURE — 700102 HCHG RX REV CODE 250 W/ 637 OVERRIDE(OP): Performed by: INTERNAL MEDICINE

## 2025-06-10 PROCEDURE — 99239 HOSP IP/OBS DSCHRG MGMT >30: CPT | Performed by: INTERNAL MEDICINE

## 2025-06-10 RX ORDER — POLYETHYLENE GLYCOL 3350 17 G/17G
17 POWDER, FOR SOLUTION ORAL
Status: SHIPPED
Start: 2025-06-10

## 2025-06-10 RX ORDER — OXYCODONE HYDROCHLORIDE 5 MG/1
2.5 TABLET ORAL EVERY 8 HOURS PRN
Qty: 5 TABLET | Refills: 0 | Status: SHIPPED | OUTPATIENT
Start: 2025-06-10 | End: 2025-06-10

## 2025-06-10 RX ORDER — OMEPRAZOLE 20 MG/1
20 CAPSULE, DELAYED RELEASE ORAL DAILY
Status: SHIPPED
Start: 2025-06-11

## 2025-06-10 RX ORDER — AMOXICILLIN 250 MG
2 CAPSULE ORAL EVERY EVENING
Status: SHIPPED
Start: 2025-06-10

## 2025-06-10 RX ORDER — ENOXAPARIN SODIUM 100 MG/ML
40 INJECTION SUBCUTANEOUS DAILY
Status: ACTIVE | DISCHARGE
Start: 2025-06-10

## 2025-06-10 RX ORDER — OXYCODONE HYDROCHLORIDE 5 MG/1
2.5 TABLET ORAL EVERY 8 HOURS PRN
Qty: 5 TABLET | Refills: 0 | Status: SHIPPED | OUTPATIENT
Start: 2025-06-10 | End: 2025-06-13

## 2025-06-10 RX ADMIN — OMEPRAZOLE 20 MG: 20 CAPSULE, DELAYED RELEASE ORAL at 05:19

## 2025-06-10 RX ADMIN — CARBIDOPA AND LEVODOPA 1 TABLET: 25; 100 TABLET ORAL at 08:21

## 2025-06-10 RX ADMIN — LEVOTHYROXINE SODIUM 50 MCG: 0.05 TABLET ORAL at 05:19

## 2025-06-10 RX ADMIN — ACETAMINOPHEN 650 MG: 325 TABLET ORAL at 05:19

## 2025-06-10 RX ADMIN — VENLAFAXINE HYDROCHLORIDE 150 MG: 75 CAPSULE, EXTENDED RELEASE ORAL at 05:19

## 2025-06-10 RX ADMIN — OXYCODONE 2.5 MG: 5 TABLET ORAL at 09:58

## 2025-06-10 ASSESSMENT — PAIN SCALES - PAIN ASSESSMENT IN ADVANCED DEMENTIA (PAINAD)
TOTALSCORE: 1
FACIALEXPRESSION: SMILING OR INEXPRESSIVE
BODYLANGUAGE: RELAXED
FACIALEXPRESSION: SAD, FRIGHTENED, FROWN
BREATHING: NORMAL
CONSOLABILITY: DISTRACTED OR REASSURED BY VOICE/TOUCH
BODYLANGUAGE: TENSE, DISTRESSED PACING, FIDGETING
BREATHING: NORMAL
BREATHING: NORMAL
CONSOLABILITY: NO NEED TO CONSOLE
TOTALSCORE: 3
BODYLANGUAGE: TENSE, DISTRESSED PACING, FIDGETING
CONSOLABILITY: NO NEED TO CONSOLE
TOTALSCORE: 0
FACIALEXPRESSION: SMILING OR INEXPRESSIVE

## 2025-06-10 ASSESSMENT — PAIN DESCRIPTION - PAIN TYPE
TYPE: SURGICAL PAIN
TYPE: ACUTE PAIN
TYPE: SURGICAL PAIN
TYPE: ACUTE PAIN

## 2025-06-10 NOTE — PROGRESS NOTES
Received bedside patient report from CLARI Gerard. Patient resting comfortably in bed, no complaints at this time. Safety precautions in place. Will continue to monitor.

## 2025-06-10 NOTE — DISCHARGE PLANNING
DPA received conformation Pt has been accepted to [facility] per spouses request. [facility] states they can admit today. [facility] SW notified so transport can be set.     DPA to call and cancel transfer to [facility].

## 2025-06-10 NOTE — PROGRESS NOTES
1235-report given to Hope in [facility].  Discharging patient SNF per MD order.  demonstrated understanding of discharge instructions, follow up appointments, home medications, prescriptions, and home care for surgical wound. Pain well controlled, tolerating oral medications, O2 greater than 90%.  Given discharge instructions and transferred packet to [facility].. Pt discharged off unit with [facility] escort.

## 2025-06-10 NOTE — DISCHARGE PLANNING
[facility] transport changed to 1300 going to [facility] per  request. Care team notified.

## 2025-06-10 NOTE — CARE PLAN
The patient is Stable - Low risk of patient condition declining or worsening    Shift Goals  Clinical Goals: remain free from falls, manage pain at or above 3/10 with medication per MAR, monitor urine output and perform post void bladder scans, reorient pt as needed, mobilize to chair for meals  Patient Goals: rest, manage pain, mobilize  Family Goals: Stay updated on POC    Progress made toward(s) clinical / shift goals:  no falls, pain managed with medication per MAR, elmore placed today, pt reoriented throughout the day, pt was mobilized to chair for meals.    Problem: Pain - Standard  Goal: Alleviation of pain or a reduction in pain to the patient’s comfort goal  Outcome: Progressing     Problem: Skin Integrity  Goal: Skin integrity is maintained or improved  Outcome: Progressing     Problem: Fall Risk  Goal: Patient will remain free from falls  Outcome: Progressing     Problem: Early Mobilization - Post Surgery  Goal: Early mobilization post surgery  Outcome: Progressing     Problem: Pain - Post Surgery  Goal: Alleviation or reduction of pain post surgery  Outcome: Progressing     Problem: Safety  Goal: Will remain free from injury  Outcome: Progressing  Goal: Will remain free from falls  Outcome: Progressing     Problem: Venous Thromboembolism (VTW)/Deep Vein Thrombosis (DVT) Prevention:  Goal: Patient will participate in Venous Thrombosis (VTE)/Deep Vein Thrombosis (DVT)Prevention Measures  Outcome: Progressing     Problem: Bowel/Gastric:  Goal: Normal bowel function is maintained or improved  Outcome: Progressing     Problem: Urinary Elimination:  Goal: Ability to reestablish a normal urinary elimination pattern will improve  Outcome: Progressing     Problem: Skin Integrity  Goal: Risk for impaired skin integrity will decrease  Outcome: Progressing     Problem: Mobility  Goal: Risk for activity intolerance will decrease  Outcome: Progressing       Patient is not progressing towards the following goals:

## 2025-06-10 NOTE — PROGRESS NOTES
Received bedside report from night shift RN.  Assumed pt care.   Assessment and chart check complete.  Pt is AA0X2, respirations even and unlabored, no signs of distress, denies nausea/vomiting.  Mckeon catheter in place draining to yellow color output.  Fall precautions in place, treaded socks on pt, bed in low position.   Call light within reach.   Educated pt to call if needing anything.   Hourly rounding.

## 2025-06-10 NOTE — DISCHARGE SUMMARY
"Discharge Summary    CHIEF COMPLAINT ON ADMISSION  Chief Complaint   Patient presents with    Hip Pain    GLF       Reason for Admission  EMS     Admission Date  6/4/2025    CODE STATUS  Full Code    HPI & HOSPITAL COURSE  Per chart review:  \"80 y.o. female with a past medical history of Parkinson's disease, and hypothyroidism who presented 6/4/2025 with hip pain after a fall.  Pain is severe rated 10/10.  The pain is worse with movement.  Patient tripped and fell as she was hiking on a trail.  She denies hitting her head or losing consciousness.\"     The patient was admitted for further management and care.  The patient was found to have left femoral neck fracture.  Orthopedic surgery was consulted and the patient underwent: \"Open reduction internal fixation left intertrochanteric femur fracture with cephalomedullary implant\"    The patient currently not complaining of overt pain she seems to be comfortable.    Of note the patient was found to have waxing and waning altered mental status I suspect this is underlying cognitive disorder.  As per family members it seems the patient at home also having waxing and waning altered mental status, confusion.  This seems to be her baseline.    The patient was found to have urinary retention so we consulted with urology with Dr. Fernando Mata who recommended Mckeon catheter and they will follow-up with the patient as an outpatient.    The patient has not been accepted to a skilled nursing facility.  Will discharge patient to skilled nursing facility.  The patient require close follow-up with PCP as an outpatient    As per orthopedic surgery continue Lovenox for DVT prophylaxis and transition to aspirin twice daily when she is mobilizing better.  She will then require 4 weeks of aspirin twice daily.    Therefore, she is discharged in fair and stable condition to skilled nursing facility.    The patient met 2-midnight criteria for an inpatient stay at the time of " discharge.    Discharge Date  06/10/2025    FOLLOW UP ITEMS POST DISCHARGE  The patient will require close follow up as outpatient with PCP and Orthopedic Surgery as outpatient.    DISCHARGE DIAGNOSES  Principal Problem:    Left femoral neck fracture (POA: Yes)  Active Problems:    Acquired hypothyroidism (Chronic) (POA: Yes)    Gastroesophageal reflux disease with esophagitis (Chronic) (POA: Yes)    Parkinson's disease (HCC) (POA: Yes)    LUIS ALFREDO (generalized anxiety disorder) (POA: Yes)    Thrombocytopenia (HCC) (POA: Yes)    Leukocytosis (POA: Unknown)    Hyperglycemia (POA: Unknown)    Altered mental status (POA: Unknown)    Urinary retention (POA: Unknown)    Advance care planning (POA: Unknown)  Resolved Problems:    * No resolved hospital problems. *      FOLLOW UP  Future Appointments   Date Time Provider Department Center   8/8/2025 10:40 AM Goyo Jimenez D.O. [facility] None     [facility]  [address] [facility] [address]  [phone]        Eldon Ross M.D.  [address]  [place] [address]  [phone]    Schedule an appointment as soon as possible for a visit      Fantasma Sylvester M.D.  [address]  [place] [address] [phone]    Schedule an appointment as soon as possible for a visit      Fernando Morris M.D.  [address]  [place] [address]  [phone]    Schedule an appointment as soon as possible for a visit        MEDICATIONS ON DISCHARGE     Medication List        START taking these medications        Instructions   enoxaparin 40 MG/0.4ML Sosy inj  Commonly known as: Lovenox   Inject 40 mg under the skin every day at 6 PM.  Dose: 40 mg     omeprazole 20 MG delayed-release capsule  Start taking on: June 11, 2025  Commonly known as: PriLOSEC   Take 1 Capsule by mouth every day.  Dose: 20 mg     oxyCODONE immediate-release 5 MG Tabs  Commonly known as: Roxicodone   Take 0.5 Tablets by mouth every 8 hours as needed for Severe Pain for up to 3 days.  Dose: 2.5 mg    "  polyethylene glycol/lytes 17 g Pack  Commonly known as: Miralax   Take 1 Packet by mouth 1 time a day as needed (constipation).  Dose: 17 g     senna-docusate 8.6-50 MG Tabs  Commonly known as: Pericolace Or Senokot S   Take 2 Tablets by mouth every evening.  Dose: 2 Tablet            CONTINUE taking these medications        Instructions   acetaminophen 500 MG Tabs  Commonly known as: Tylenol   Take 500 mg by mouth every 6 hours as needed. Indications: Pain  Dose: 500 mg     carbidopa-levodopa  MG Tabs  Commonly known as: Sinemet   Take 1 Tablet by mouth 3 times a day for 360 days. Start according to clinic instructions. For Parkinson's  Dose: 1 Tablet     denosumab 60 MG/ML Sosy injection  Commonly known as: Prolia   Inject 1 mL under the skin every 6 months.  Dose: 60 mg     Synthroid 50 MCG Tabs  Generic drug: levothyroxine   Take 1 Tablet by mouth every day.  Dose: 50 mcg     venlafaxine 150 MG extended-release capsule  Commonly known as: Effexor-XR   Take 1 capsule by mouth once daily  Dose: 150 mg     VITAMIN D PO   Take 1 Capsule by mouth every evening.  Dose: 1 Capsule            ASK your doctor about these medications        Instructions   Myrbetriq 25 MG Tb24  Generic drug: mirabegron ER  Ask about: Which instructions should I use?   Take 1 Tablet by mouth every day.  Dose: 25 mg              Allergies  Allergies[1]    DIET  Orders Placed This Encounter   Procedures    Diet Order Diet: Regular     Standing Status:   Standing     Number of Occurrences:   1     Diet::   Regular [1]       ACTIVITY  As tolerated and directed by skilled nursing.  \"WBAT LLE\"    CONSULTATIONS  Orthopedic Surgery    PROCEDURES  PROCEDURE PERFORMED:   Open reduction internal fixation left intertrochanteric femur fracture with cephalomedullary implant      DX-HIP-UNILATERAL-WITH PELVIS-1 VIEW LEFT   Final Result      Digitized intraoperative radiograph is submitted for review. This examination is not for diagnostic purpose " but for guidance during a surgical procedure. Please see the patient's chart for full procedural details.         INTERPRETING LOCATION: [address], [place], [address]      DX-PORTABLE FLUOROSCOPY < 1 HOUR Reason For Exam: Intra op/ non reportable   Final Result      Portable fluoroscopy utilized for 44 seconds.      INTERPRETING LOCATION: [address], [place], [address]      IM-EZIN-YXQNGFHXID (WITH 1-VIEW CXR) LEFT   Final Result      Normal rib series.      DX-FEMUR-2+ LEFT   Final Result      Left femoral neck fracture with impaction and angulation. There is also a component of fracture extending into the greater and lesser trochanters.      DX-PELVIS-1 OR 2 VIEWS   Final Result      Left femoral neck fracture with impaction and angulation. There is a component seen extending into the lesser and greater trochanters as well.           LABORATORY  Lab Results   Component Value Date    SODIUM 140 06/08/2025    POTASSIUM 3.6 06/08/2025    CHLORIDE 104 06/08/2025    CO2 26 06/08/2025    GLUCOSE 105 (H) 06/08/2025    BUN 22 06/08/2025    CREATININE 0.54 06/08/2025    CREATININE 0.95 02/04/2011    GLOMRATE 59 (L) 02/04/2011        Lab Results   Component Value Date    WBC 10.4 06/08/2025    HEMOGLOBIN 9.2 (L) 06/09/2025    HEMATOCRIT 27.7 (L) 06/09/2025    PLATELETCT 141 (L) 06/08/2025        Total time of the discharge process exceeds 31 minutes.         [1]   Allergies  Allergen Reactions    Augmentin [Amoxicillin-Pot Clavulanate] Diarrhea     Severe diarrhea    Iodine Rash     rash    Mercury Rash     .    Other Environmental Runny Nose and Unspecified     Grasses, trees, animal dander, also causes drowsiness

## 2025-06-10 NOTE — PROGRESS NOTES
4 Eyes Skin Assessment Completed by CLARI Oviedo and CLARI Steel.    Skin assessment is primarily focused on high risk bony prominences. Pay special attention to skin beneath and around medical devices, high risk bony prominences, skin to skin areas and areas where the patient lacks sensation to feel pain and areas where the patient previously had breakdown.      Head (Occipital):  WDL   Ears (Under Medical Devices): WDL   Nose (Under Medical Devices): WDL   Mouth:  dry   Neck: WDL   Breast/Chest:  WDL   Shoulder Blades:  WDL   Spine:   WDL   (R) Arm/Elbow/Hand: Pink and Blanching   (L) Arm/Elbow/Hand: Skin Tear, Scab, Red, and Bruising   Abdomen: Bruising   Pannus/Groin:  WDL   Sacrum/Coccyx:   Red, Light Purple, and Blanching   (R) Ischial Tuberosity (Sit Bones):  Red and Blanching   (L) Ischial Tuberosity (Sit Bones):  Red and Blanching   (R) Leg:  WDL   (L) Leg:  Scab, Red, Blanching, Bruising, and Incision   (R) Heel:  Red, Blanching, and Boggy   (R) Foot/Toe: WDL   (L) Heel: Red, Blanching, and Boggy   (L) Foot/Toe:  WDL     DEVICES IN USE:   Respiratory Devices:  NA, patient on room air  Feeding Devices:  N/A   Lines & BP Monitoring Devices: Pulse ox    Orthopedic Devices:  N/A  Miscellaneous Devices:  N/A     PROTOCOL INTERVENTIONS:   Standard/Trauma Bed:  Already in place  Offloading Dressing - Sacrum:  Already in place  Offloading Dressing - Heel:  Reinforced/reapplied  Q2 Turns with Pillows: Already in place  Glide Sheet:  Already in place  Dri-Zurdo/Micro Climate Pads:  Already in place     WOUND PHOTOS:   N/A no wounds identified     WOUND CONSULT:   N/A, no advanced wound care needs identified

## 2025-06-10 NOTE — PROGRESS NOTES
4 Eyes Skin Assessment Completed by Pauline RN and Tirso RN.    Skin assessment is primarily focused on high risk bony prominences. Pay special attention to skin beneath and around medical devices, high risk bony prominences, skin to skin areas and areas where the patient lacks sensation to feel pain and areas where the patient previously had breakdown.     Head (Occipital):  WDL   Ears (Under Medical Devices): WDL   Nose (Under Medical Devices): WDL   Mouth:  dry   Neck: WDL   Breast/Chest:  WDL   Shoulder Blades:  WDL   Spine:   WDL   (R) Arm/Elbow/Hand: Pink and Blanching   (L) Arm/Elbow/Hand: Skin Tear, Scab, Red, and Bruising   Abdomen: Bruising   Pannus/Groin:  WDL   Sacrum/Coccyx:   Red, Light Purple, and Blanching   (R) Ischial Tuberosity (Sit Bones):  Red and Blanching   (L) Ischial Tuberosity (Sit Bones):  Red and Blanching   (R) Leg:  WDL   (L) Leg:  Scab, Red, Blanching, Bruising, and Incision   (R) Heel:  Red, Blanching, and Boggy   (R) Foot/Toe: WDL   (L) Heel: Red, Blanching, and Boggy   (L) Foot/Toe:  WDL       DEVICES IN USE:   Respiratory Devices:  NA, patient on room air  Feeding Devices:  N/A   Lines & BP Monitoring Devices:  BP cuff and Pulse ox    Orthopedic Devices:  N/A  Miscellaneous Devices:  N/A    PROTOCOL INTERVENTIONS:   Standard/Trauma Bed:  Already in place  Offloading Dressing - Sacrum:  Applied this assessment  Offloading Dressing - Heel:  Reinforced/reapplied  Q2 Turns with Pillows:  pt has mobilized to chair many times today  Glide Sheet:  Already in place  Dri-Zurdo/Micro Climate Pads:  Already in place    WOUND PHOTOS:   N/A no wounds identified    WOUND CONSULT:   N/A, no advanced wound care needs identified

## 2025-06-10 NOTE — DISCHARGE PLANNING
Case Management Discharge Planning    Admission Date: 6/4/2025  GMLOS: 3.1  ALOS: 6    6-Clicks ADL Score: 12  6-Clicks Mobility Score: 11  PT and/or OT Eval ordered: Yes  Post-acute Referrals Ordered: Yes  Post-acute Choice Obtained: Yes  Has referral(s) been sent to post-acute provider:  Yes      Anticipated Discharge Dispo: Discharge Disposition: D/T to SNF with Medicare cert in anticipation of skilled care (03)    DME Needed: No    Action(s) Taken: LMSW spoke with pt's  Alonso, he is request pt be discharge to [facility] not [facility] due to location of [facility] being closer to his home. Rec'd verbal choice for change from [facility] to [facility]    Escalations Completed: None    Medically Clear: Yes    Next Steps: set up transportation.    Barriers to Discharge: None

## 2025-06-10 NOTE — CARE PLAN
The patient is Stable - Low risk of patient condition declining or worsening    Shift Goals  Clinical Goals: manage pain at tolerable level throughout the shift  Patient Goals: pain control, rest comfortably  Family Goals: Stay updated on POC    Progress made toward(s) clinical / shift goals:  Administered PRN medication per MAR for pain controlled. Pt is comfortably resting in bed.Safety measure rendered.     Patient is not progressing towards the following goals:      Problem: Fall Risk  Goal: Patient will remain free from falls  Outcome: Progressing     Problem: Early Mobilization - Post Surgery  Goal: Early mobilization post surgery  Outcome: Progressing     Problem: Pain - Post Surgery  Goal: Alleviation or reduction of pain post surgery  Outcome: Progressing     Problem: Safety  Goal: Will remain free from injury  Outcome: Progressing     Problem: Post-Operative Hip Replacement  Goal: Early mobilization post surgery  Outcome: Progressing  Goal: Patient will demonstrate understanding of post-surgical hip precautions, weight bearing restrictions and DME usage during hospital stay and post discharge  Outcome: Progressing  Goal: Patient's neurovascular status will be maintained or improve  Outcome: Progressing     Problem: Incision Care  Goal: Optimal post surgical incision care  Outcome: Progressing     Problem: Respiratory/Oxygenation Function Post-Surgical  Goal: Patient will achieve/maintain normal respiratory rate/effort  Outcome: Progressing     Problem: Discharge Barriers/Planning  Goal: Patient's continuum of care needs are met  Outcome: Progressing  Flowsheets (Taken 6/10/2025 1207)  Continuum of Care Needs:   Communicated discharge barriers to interdisciplinary tream   Assessed for discharge barriers   Involved patient/family/support system in discharge process   Collaborated with Case Management/   Provided and explained discharge instructions

## 2025-06-11 NOTE — Clinical Note
REFERRAL APPROVAL NOTICE         Sent on June 11, 2025                   Kimmy Diaz  [address]  [facility] [address]                   Dear Ms. Diaz,    After a careful review of the medical information and benefit coverage, [facility] has processed your referral. See below for additional details.    If applicable, you must be actively enrolled with your insurance for coverage of the authorized service. If you have any questions regarding your coverage, please contact your insurance directly.    REFERRAL INFORMATION   Referral #:  [id number]  Referred-To Provider    Referred-By Provider:  Urology    Fernando Bo M.D.   UROLOGY [address] [address]  [phone] [address]  [facility] [address]  [phone]    Referral Start Date:  06/09/2025  Referral End Date:   06/09/2026             SCHEDULING  If you do not already have an appointment, please call [phone] to make an appointment.     MORE INFORMATION  If you do not already have a [email or web address] account, sign up at: [email or web address]  You can access your medical information, make appointments, see lab results, billing information, and more.  If you have questions regarding this referral, please contact  the [facility] Referrals department at:             [phone]. Monday - Friday 8:00AM - 5:00PM.     Sincerely,    [facility]

## 2025-07-13 ENCOUNTER — APPOINTMENT (OUTPATIENT)
Dept: RADIOLOGY | Facility: MEDICAL CENTER | Age: 80
End: 2025-07-13
Attending: EMERGENCY MEDICINE
Payer: MEDICARE

## 2025-07-13 ENCOUNTER — HOSPITAL ENCOUNTER (EMERGENCY)
Facility: MEDICAL CENTER | Age: 80
End: 2025-07-13
Attending: EMERGENCY MEDICINE
Payer: MEDICARE

## 2025-07-13 VITALS
HEIGHT: 60 IN | DIASTOLIC BLOOD PRESSURE: 74 MMHG | OXYGEN SATURATION: 95 % | BODY MASS INDEX: 20.62 KG/M2 | RESPIRATION RATE: 16 BRPM | WEIGHT: 105 LBS | SYSTOLIC BLOOD PRESSURE: 149 MMHG | TEMPERATURE: 97.3 F | HEART RATE: 88 BPM

## 2025-07-13 DIAGNOSIS — Z00.00 EVALUATION BY MEDICAL SERVICE REQUIRED: Primary | ICD-10-CM

## 2025-07-13 PROCEDURE — 72125 CT NECK SPINE W/O DYE: CPT

## 2025-07-13 PROCEDURE — 70450 CT HEAD/BRAIN W/O DYE: CPT

## 2025-07-13 PROCEDURE — 99284 EMERGENCY DEPT VISIT MOD MDM: CPT

## 2025-07-13 ASSESSMENT — FIBROSIS 4 INDEX: FIB4 SCORE: 9.71

## 2025-07-13 NOTE — ED NOTES
Pt incontinent of urine; changed.   Able to stand a take a few little steps forward and backwards x2 assist.

## 2025-07-13 NOTE — DISCHARGE PLANNING
SW arranged gurney & 0L O2 transport via [facility] to [facility]. Bedside RN updated. PCS scanned into media. COBRA packet given to bedside RN.     Time: 1000

## 2025-07-13 NOTE — DISCHARGE INSTRUCTIONS
You were seen in the ER after a potential fall.  Thankfully your imaging is normal.  You are safe for discharge.  Please follow-up with your primary care provider as needed.  Return to the ER with new or worsening symptoms.

## 2025-07-13 NOTE — ED TRIAGE NOTES
EdytaAnia Diaz  female  female  Chief Complaint   Patient presents with    T-5000 GLF     Brought in by sonny from [facility]. Per report patient was found on the floor crawling. No obvious trauma. Hx of left hip surgery. Takes lovenox. A & O x 2

## 2025-07-13 NOTE — ED PROVIDER NOTES
ED Provider Note    CHIEF COMPLAINT  Chief Complaint   Patient presents with    T-5000 GLF     Brought in by sonny from [facility]. Per report patient was found on the floor crawling. No obvious trauma. Hx of left hip surgery. Takes lovenox. A & O x 2       EXTERNAL RECORDS REVIEWED  Inpatient Notes patient was admitted to this facility 6/4/2025 for left hip pain after a fall.  She was found to have a fracture and underwent ORIF of the left intertrochanteric femur fracture.  She was discharged to a skilled nursing facility on Lovenox twice daily for DVT prophylaxis.    HPI/ROS  LIMITATION TO HISTORY   Select: : None  OUTSIDE HISTORIAN(S):  EMS reports that the patient is alert and oriented x 2 at baseline    Kimmy Diaz is a 80 y.o. female who presents with a possible ground-level fall.  The patient is currently at [facility] following surgery for left hip fracture in June.  EMS was told she is alert and oriented x 2 at her baseline.  Today she was found crawling out of her room and staff was uncertain if she had taken a fall so EMS was contacted.  She is on Lovenox for DVT prophylaxis following her orthopedic surgery.  The patient herself denies any fall or head trauma.  She states that she was crawling to the restroom because she has persistent pain in the left hip after her surgery and feels safer getting to the restroom this way.  She denies any headache, chest pain, shortness of breath, nausea, vomiting, diarrhea, constipation.    PAST MEDICAL HISTORY   has a past medical history of Acquired hypothyroidism (04/14/2023), Actinic keratoses (01/24/2019), Allergic rhinitis, Anesthesia (04/14/2023), Anxiety, Cancer (HCC), CATARACT (04/14/2023), Gastroesophageal reflux disease with esophagitis (01/24/2019), Hemochromatosis, History of squamous cell carcinoma (01/24/2019), Hyperlipidemia, Osteoporosis, Other specified symptom associated with female genital organs, Parkinson's  disease (tremor, stiffness, slow motion, unstable posture) ([facility]), Pneumonia (04/14/2023), PONV (postoperative nausea and vomiting) (04/14/2023), Psychiatric problem (04/14/2023), Pure hypercholesterolemia (05/23/2019), Urinary bladder disorder, Vaginal prolapse (01/24/2019), and Vitamin D deficiency.    SURGICAL HISTORY   has a past surgical history that includes tubal ligation (1980); other; vaginal hysterectomy scope total (08/24/2011); anterior and posterior repair (08/24/2011); shoulder arthroscopy w/ rotator cuff repair (07/11/2012); biceps tendon repair (07/11/2012); shoulder decompression (07/11/2012); cataract extraction with iol (Bilateral, 2016); other (2018); shldr arthroscop,surg,w/rotat cuff repr (Right, 11/04/2020); arthroscopy shoulder surgical biceps tenodes* (Right, 11/04/2020); other orthopedic surgery (Right, 04/14/2023); lumbar decompression (5/1/2023); and open fix inter/subtroch fx,implnt (Left, 6/5/2025).    FAMILY HISTORY  Family History   Problem Relation Age of Onset    Diabetes Other     Lung Disease Mother        SOCIAL HISTORY  Social History     Tobacco Use    Smoking status: Never    Smokeless tobacco: Never   Vaping Use    Vaping status: Never Used   Substance and Sexual Activity    Alcohol use: Yes     Comment: occasionally    Drug use: No    Sexual activity: Yes       CURRENT MEDICATIONS  Home Medications       Reviewed by Julio Cesar Horton R.N. (Registered Nurse) on 07/13/25 at 0653  Med List Status: Partial     Medication Last Dose Status   acetaminophen (TYLENOL) 500 MG Tab  Active   carbidopa-levodopa (SINEMET)  MG Tab  Active   denosumab (PROLIA) 60 MG/ML Solution Prefilled Syringe injection  Active   enoxaparin (LOVENOX) 40 MG/0.4ML Solution Prefilled Syringe inj  Active   MYRBETRIQ 25 MG TABLET SR 24 HR  Active   omeprazole (PRILOSEC) 20 MG delayed-release capsule  Active   polyethylene glycol/lytes (MIRALAX) 17 g Pack  Active   senna-docusate (PERICOLACE OR SENOKOT S)  8.6-50 MG Tab  Active   SYNTHROID 50 MCG Tab  Active   venlafaxine (EFFEXOR-XR) 150 MG extended-release capsule  Active   VITAMIN D PO  Active                  Audit from Redirected Encounters    **Home medications have not yet been reviewed for this encounter**         ALLERGIES  Allergies[1]    PHYSICAL EXAM  VITAL SIGNS: BP (!) 165/80   Pulse 89   Temp 36.3 °C (97.3 °F) (Temporal)   Resp 18   Ht 1.524 m (5')   Wt 47.6 kg (105 lb)   SpO2 94%   BMI 20.51 kg/m²    Physical Exam  Vitals and nursing note reviewed.   Constitutional:       Appearance: Normal appearance.   HENT:      Head: Normocephalic and atraumatic.      Right Ear: External ear normal.      Left Ear: External ear normal.      Nose: Nose normal.      Mouth/Throat:      Mouth: Mucous membranes are moist.      Pharynx: Oropharynx is clear.   Eyes:      Extraocular Movements: Extraocular movements intact.      Conjunctiva/sclera: Conjunctivae normal.      Pupils: Pupils are equal, round, and reactive to light.   Cardiovascular:      Rate and Rhythm: Normal rate and regular rhythm.   Pulmonary:      Effort: Pulmonary effort is normal.      Breath sounds: Normal breath sounds.   Abdominal:      Palpations: Abdomen is soft.      Tenderness: There is no abdominal tenderness.   Musculoskeletal:         General: Normal range of motion.      Cervical back: Normal range of motion and neck supple.   Skin:     General: Skin is warm and dry.   Neurological:      General: No focal deficit present.      Mental Status: She is alert and oriented to person, place, and time. Mental status is at baseline.      Comments: Tremulous.   Psychiatric:         Mood and Affect: Mood normal.         Behavior: Behavior normal.       EKG/LABS  N/A    RADIOLOGY/PROCEDURES   I have independently interpreted the diagnostic imaging associated with this visit and am waiting the final reading from the radiologist.   My preliminary interpretation is as follows: No intracranial  hemorrhage    Radiologist interpretation:  CT-CSPINE WITHOUT PLUS RECONS   Final Result      No acute process seen. Cervical spine degenerative changes.      CT-HEAD W/O   Final Result      No acute process.                 COURSE & MEDICAL DECISION MAKING    ASSESSMENT, COURSE AND PLAN  Care Narrative: This is a 80-year-old female who was brought here by ambulance after she was found crawling out of her room at her skilled nursing facility which concerned staff for a fall.  The patient here denies any fall or head trauma.  She states she was crawling due to concern for instability following a hip fracture 1 month ago which is the reason for her SNF placement.  She has no current complaints.  She is anticoagulated on Lovenox.  She is oriented x 2 at baseline although appears quite alert and oriented here.  There is no evidence of external trauma.  Because of her anticoagulation and some concern about underlying cognition deficits I will obtain imaging of the head and C-spine to rule out traumatic injury.    Patient was sent to the CT scanner and thankfully CTs of the head and C-spine were reassuring.  She is tolerating p.o. without difficulty.  She is ambulated with two-person assist.  She will be discharged back to her skilled nursing facility to follow-up as an outpatient.    ADDITIONAL PROBLEMS MANAGED  N/A    DISPOSITION AND DISCUSSIONS  I have discussed management of the patient with the following physicians and AGUSTIN's: None    Discussion of management with other QHP or appropriate source(s): None     Escalation of care considered, and ultimately not performed:IV fluids, Laboratory analysis, and acute inpatient care management, however at this time, the patient is most appropriate for outpatient management    Barriers to care at this time, including but not limited to: N/A.     Decision tools and prescription drugs considered including, but not limited to: None.    FINAL DIAGNOSIS  1. Evaluation by medical  service required       Electronically signed by: Yehuda Martinez M.D., 7/13/2025 6:53 AM           [1]   Allergies  Allergen Reactions    Augmentin [Amoxicillin-Pot Clavulanate] Diarrhea     Severe diarrhea    Iodine Rash     rash    Mercury Rash     .    Other Environmental Runny Nose and Unspecified     Grasses, trees, animal dander, also causes drowsiness

## 2025-08-01 ENCOUNTER — TELEPHONE (OUTPATIENT)
Dept: INTERNAL MEDICINE | Facility: IMAGING CENTER | Age: 80
End: 2025-08-01
Payer: MEDICARE

## 2025-08-01 DIAGNOSIS — G20.A1 PARKINSON'S DISEASE, UNSPECIFIED WHETHER DYSKINESIA PRESENT, UNSPECIFIED WHETHER MANIFESTATIONS FLUCTUATE (HCC): Primary | ICD-10-CM

## 2025-08-01 DIAGNOSIS — T14.8XXA FRACTURE: ICD-10-CM

## 2025-08-01 DIAGNOSIS — G20.B1 PARKINSON'S DISEASE WITH DYSKINESIA, UNSPECIFIED WHETHER MANIFESTATIONS FLUCTUATE (HCC): ICD-10-CM

## 2025-08-01 DIAGNOSIS — R41.82 ALTERED MENTAL STATUS, UNSPECIFIED ALTERED MENTAL STATUS TYPE: ICD-10-CM

## 2025-08-01 DIAGNOSIS — Z91.81 AT RISK FOR FALLS: ICD-10-CM

## 2025-08-07 ENCOUNTER — TELEPHONE (OUTPATIENT)
Dept: NEUROLOGY | Facility: MEDICAL CENTER | Age: 80
End: 2025-08-07
Payer: MEDICARE

## 2025-08-08 ENCOUNTER — APPOINTMENT (OUTPATIENT)
Dept: NEUROLOGY | Facility: MEDICAL CENTER | Age: 80
End: 2025-08-08
Attending: INTERNAL MEDICINE
Payer: MEDICARE

## (undated) DEVICE — GLOVE BIOGEL INDICATOR SZ 7.5 SURGICAL PF LTX - (50PR/BX 4BX/CA)

## (undated) DEVICE — CANISTER SUCTION 3000ML MECHANICAL FILTER AUTO SHUTOFF MEDI-VAC NONSTERILE LF DISP (40EA/CA)

## (undated) DEVICE — LACTATED RINGERS INJ 1000 ML - (14EA/CA 60CA/PF)

## (undated) DEVICE — GLOVE BIOGEL INDICATOR SZ 9 SURGICAL PF LTX - (160/CA)

## (undated) DEVICE — GLOVE BIOGEL SZ 7.5 SURGICAL PF LTX - (50PR/BX 4BX/CA)

## (undated) DEVICE — SODIUM CHL IRRIGATION 0.9% 1000ML (12EA/CA)

## (undated) DEVICE — KIT SURGIFLO W/OUT THROMBIN - (6EA/CA)

## (undated) DEVICE — ARTHROWAND TURBOVAC 3.5/90 SCT

## (undated) DEVICE — CANNULA THREADED 5X75 (5EA/BX)

## (undated) DEVICE — TOOL MR8 14CM MATCH HD SYM-TRI 3MM DIAMETER (1/EA)

## (undated) DEVICE — PACK NEURO - (2EA/CA)

## (undated) DEVICE — GLOVE BIOGEL SZ 6.5 SURGICAL PF LTX (50PR/BX 4BX/CA)

## (undated) DEVICE — DRAPE 36X28IN RAD CARM BND BG - (25/CA) O

## (undated) DEVICE — GOWN WARMING STANDARD FLEX - (30/CA)

## (undated) DEVICE — CANISTER SUCTION 3000ML MECHANICAL FILTER AUTO SHUTOFF MEDI-VAC NONSTERILE LF DISP  (40EA/CA)

## (undated) DEVICE — SLEEVE SHOULDER DISP(ARTHREX) - (6/BX)

## (undated) DEVICE — GLOVE BIOGEL PI ORTHO SZ 7.5 PF LF (40PR/BX)

## (undated) DEVICE — SET EXTENSION WITH 2 PORTS (48EA/CA) ***PART #2C8610 IS A SUBSTITUTE*****

## (undated) DEVICE — WATER IRRIGATION STERILE 1000ML (12EA/CA)

## (undated) DEVICE — PROTECTOR ULNA NERVE - (36PR/CA)

## (undated) DEVICE — GLOVE BIOGEL INDICATOR SZ 6.5 SURGICAL PF LTX - (50PR/BX 4BX/CA)

## (undated) DEVICE — GATEWAY CANNULA, 8MM X 30MM

## (undated) DEVICE — DRAPE IOBAN LARGE 2 INCISE FILM (5EA/CA)

## (undated) DEVICE — SLEEVE, VASO, THIGH, MED

## (undated) DEVICE — TUBE CONNECTING SUCTION - CLEAR PLASTIC STERILE 72 IN (50EA/CA)

## (undated) DEVICE — SET LEADWIRE 5 LEAD BEDSIDE DISPOSABLE ECG (1SET OF 5/EA)

## (undated) DEVICE — NEEDLE EXPRESSEW III (5EA/PK)

## (undated) DEVICE — BAG SPONGE COUNT 10.25 X 32 - BLUE (250/CA)

## (undated) DEVICE — SUCTION INSTRUMENT YANKAUER OPEN TIP W/O VENT (50EA/CA)

## (undated) DEVICE — GLOVE BIOGEL INDICATOR SZ 8 SURGICAL PF LTX - (50/BX 4BX/CA)

## (undated) DEVICE — PACK SHOULDER ARTHROSCOPY SM - (2EA/CA)

## (undated) DEVICE — DRESSING TEGADERM 8 X 12 - (10/BX 8BX/CA)

## (undated) DEVICE — COVER LIGHT HANDLE ALC PLUS DISP (18EA/BX)

## (undated) DEVICE — MASK ANESTHESIA ADULT  - (100/CA)

## (undated) DEVICE — HEADREST PRONEVIEW LARGE - (10/CA)

## (undated) DEVICE — BOVIE BLADE SHORT - (150EA/CT)

## (undated) DEVICE — CLOSURE WOUND 1/4 X 4 (STERI - STRIP) (50/BX 4BX/CA)

## (undated) DEVICE — ELECTRODE DUAL RETURN W/ CORD - (50/PK)

## (undated) DEVICE — GLOVE, LITE (PAIR)

## (undated) DEVICE — CHLORAPREP 26 ML APPLICATOR - ORANGE TINT(25/CA)

## (undated) DEVICE — DRAPE 36X28IN RAD CARM BND BG - (25/CA)

## (undated) DEVICE — SENSOR OXIMETER ADULT SPO2 RD SET (20EA/BX)

## (undated) DEVICE — DRAPE LARGE 3 QUARTER - (20/CA)

## (undated) DEVICE — DRAPE SHOULDER FLUID CONTROL - 77 X 85 (10/CA)

## (undated) DEVICE — KIT EVACUATER 3 SPRING PVC LF 1/8 DRAIN SIZE (10EA/CA)"

## (undated) DEVICE — SUTURE 2-0 VICRYL PLUS CT-1 - 8 X 18 INCH(12/BX)

## (undated) DEVICE — SUCTION INSTRUMENT YANKAUER BULBOUS TIP W/O VENT (50EA/CA)

## (undated) DEVICE — HUMID-VENT HEAT AND MOISTURE EXCHANGE- (50/BX)

## (undated) DEVICE — STERI STRIP COMPOUND BENZOIN - TINCTURE 0.6ML WITH APPLICATOR (40EA/BX)

## (undated) DEVICE — LOCKING DRILL 4.2X360MM

## (undated) DEVICE — SUTURE GENERAL

## (undated) DEVICE — SPONGE GAUZESTER 4 X 4 4PLY - (128PK/CA)

## (undated) DEVICE — SHAVER 5.5 RESECTOR FORMULA (5EA/BX )

## (undated) DEVICE — GLOVE SZ 7.5 LF PROTEXIS (50PR/BX)

## (undated) DEVICE — SUTURE 0 VICRYL PLUS CT-1 - 8 X 18 INCH (12/BX)

## (undated) DEVICE — LACTATED RINGERS INJ. 500 ML - (24EA/CA)

## (undated) DEVICE — DRESSING TRANSPARENT FILM TEGADERM 4 X 4.75" (50EA/BX)"

## (undated) DEVICE — TOWEL STOP TIMEOUT SAFETY FLAG (40EA/CA)

## (undated) DEVICE — GLOVE PROTEXIS PI MICRO SZ 8.5 (200PR/CA)

## (undated) DEVICE — TUBING CLEARLINK DUO-VENT - C-FLO (48EA/CA)

## (undated) DEVICE — SODIUM CHL. IRRIGATION 0.9% 3000ML (4EA/CA 65CA/PF)

## (undated) DEVICE — TAPE XBRAID TT 2.0MM (12EA/BX)

## (undated) DEVICE — DRAPE STRLE REG TOWEL 18X24 - (10/BX 4BX/CA)"

## (undated) DEVICE — COVER MAYO STAND X-LG - (22EA/CA)

## (undated) DEVICE — SUTURE 3-0 ETHILON FS-1 - (36/BX) 30 INCH

## (undated) DEVICE — SENSOR SPO2 NEO LNCS ADHESIVE (20/BX) SEE USER NOTES

## (undated) DEVICE — SYRINGE 30 ML LL (56/BX)

## (undated) DEVICE — DRAPE LAPAROTOMY T SHEET - (12EA/CA)

## (undated) DEVICE — ELECTRODE 850 FOAM ADHESIVE - HYDROGEL RADIOTRNSPRNT (50/PK)

## (undated) DEVICE — TUBING C&T SET FLYING LEADS DRAIN TUBING (10EA/BX)

## (undated) DEVICE — SHAVER4.0 AGGRESSIVE + FORMLA (5EA/BX)

## (undated) DEVICE — SUTURE 2-0 VICRYL PLUS CT-2 - 27 INCH (36/BX)

## (undated) DEVICE — KIT ANESTHESIA W/CIRCUIT & 3/LT BAG W/FILTER (20EA/CA)

## (undated) DEVICE — DRESSING XEROFORM 1X8 - (50/BX 4BX/CA)

## (undated) DEVICE — GLOVE BIOGEL SZ 7 SURGICAL PF LTX - (50PR/BX 4BX/CA)

## (undated) DEVICE — GLOVE BIOGEL PI INDICATOR SZ 7.0 SURGICAL PF LF - (50/BX 4BX/CA)

## (undated) DEVICE — TUBING PATIENT W/CONNECTOR REDEUCE (1EA)

## (undated) DEVICE — CLEANER ELECTRO-SURGICAL TIP - (25/BX 4BX/CA)

## (undated) DEVICE — GLOVE SZ 7.5 BIOGEL PI MICRO - PF LF (50PR/BX)

## (undated) DEVICE — CANISTER SUCTION RIGID RED 1500CC (40EA/CA)

## (undated) DEVICE — SUTURE 3-0 VICRYL PLUS RB-1 - 8 X 18 INCH (12/BX)

## (undated) DEVICE — MIDAS LUBRICATOR DIFFUSER PACK (4EA/CA)

## (undated) DEVICE — DRAPE U SPLIT IMP 54 X 76 - (24/CA)

## (undated) DEVICE — ARMREST CRADLE FOAM - (2PR/PK 12PR/CA)

## (undated) DEVICE — DRAPE MICROSCOPE ARMATEC 120IN X 46IN (10EA/CA)

## (undated) DEVICE — DRAPETIBURON SHOULDER W/POUCH - (5EA/CA)

## (undated) DEVICE — TOWELS CLOTH SURGICAL - (4/PK 20PK/CA)

## (undated) DEVICE — CANNULA FULLY THREADED 8 X 75 (5EA/BX)

## (undated) DEVICE — TUBING PUMP WITH CONNECTOR REDEUCE (1EA)

## (undated) DEVICE — HEAD HOLDER JUNIOR/ADULT

## (undated) DEVICE — PACK MAJOR ORTHO TRAUMA- (3EA/CA)

## (undated) DEVICE — ABLATOR WAND SERFAS 90-S CRUISE

## (undated) DEVICE — NEPTUNE 4 PORT MANIFOLD - (20/PK)